# Patient Record
Sex: FEMALE | Race: WHITE | Employment: OTHER | URBAN - METROPOLITAN AREA
[De-identification: names, ages, dates, MRNs, and addresses within clinical notes are randomized per-mention and may not be internally consistent; named-entity substitution may affect disease eponyms.]

---

## 2017-01-12 ENCOUNTER — MED REC SCAN ONLY (OUTPATIENT)
Dept: NEPHROLOGY | Facility: CLINIC | Age: 82
End: 2017-01-12

## 2017-01-18 PROBLEM — H35.3221 EXUDATIVE AGE-RELATED MACULAR DEGENERATION OF LEFT EYE WITH ACTIVE CHOROIDAL NEOVASCULARIZATION (HCC): Status: ACTIVE | Noted: 2017-01-18

## 2017-01-25 ENCOUNTER — MED REC SCAN ONLY (OUTPATIENT)
Dept: NEPHROLOGY | Facility: CLINIC | Age: 82
End: 2017-01-25

## 2017-02-02 ENCOUNTER — OFFICE VISIT (OUTPATIENT)
Dept: HEMATOLOGY/ONCOLOGY | Age: 82
End: 2017-02-02
Attending: INTERNAL MEDICINE
Payer: MEDICARE

## 2017-02-02 VITALS
TEMPERATURE: 98 F | RESPIRATION RATE: 18 BRPM | BODY MASS INDEX: 21.02 KG/M2 | SYSTOLIC BLOOD PRESSURE: 140 MMHG | OXYGEN SATURATION: 98 % | HEART RATE: 98 BPM | DIASTOLIC BLOOD PRESSURE: 65 MMHG | HEIGHT: 65 IN | WEIGHT: 126.19 LBS

## 2017-02-02 DIAGNOSIS — C20 RECTAL CANCER (HCC): Primary | ICD-10-CM

## 2017-02-02 DIAGNOSIS — L02.91 ABSCESS: ICD-10-CM

## 2017-02-02 PROCEDURE — 99205 OFFICE O/P NEW HI 60 MIN: CPT | Performed by: INTERNAL MEDICINE

## 2017-02-02 NOTE — PROGRESS NOTES
Pt here for consult for anal ca. Pt has had ileostomy for about 2.5 years now. Pt has macular degeneration. Energy level and appetite are good. Denies pain. Pt has no further complaints.      Education Record    Learner:  Patient    Disease / Diagnosis:

## 2017-02-03 NOTE — CONSULTS
[facility]    PATIENT'S NAME: MALCOLM Kidd   CONSULTING PHYSICIAN: Thad Alba M.D.    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   She has had imaging that includes an MRI of the abdomen and pelvis done through [facility] 2 November 30, 2016, and shows this abscess cavity but n a history of Sjogren syndrome, Crohn disease, fibromyalgia. She has had vulvar Paget's disease. She is status post vulvectomy. She has had macular degeneration. The perirectal abscesses. She had a TIA on 1 occasion. She has had hypertension.   She has SIGNS:  Her performance status is 1. Her weight is 126 pounds. Blood pressure 140/65, pulse 98, respiratory rate 20, temperature 97.8. HEENT:  Unremarkable. She has pink conjunctivae, anicteric sclerae. Pharynx without lesions.   LYMPHATICS:  She has n with whatever approach would be undertaken. A single agent anti-EGFR antibody is a reasonable choice but relatively unlikely to work. I am still awaiting her DELILAH/GUCCI testing.   I told her I would present her at our own [facility] 16.

## 2017-02-03 NOTE — CONSULTS
[facility]    PATIENT'S NAME: MALCOLM Gates   CONSULTING PHYSICIAN: Anna Calixto M.D.    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   She has had imaging that includes an MRI of the abdomen and pelvis done through [facility] 2 November 30, 2016, and shows this abscess cavity but no distant metastases, no evidence o Crohn disease, fibromyalgia. She has had vulvar Paget's disease. She is status post vulvectomy. She has had macular degeneration. The perirectal abscesses. She had a TIA on 1 occasion. She has had hypertension. She has history of pneumonia.   She has Her weight is 126 pounds. Blood pressure 140/65, pulse 98, respiratory rate 20, temperature 97.8. HEENT:  Unremarkable. She has pink conjunctivae, anicteric sclerae. Pharynx without lesions.   LYMPHATICS:  She has no cervical, supraclavicular or axilla single agent anti-EGFR antibody is a reasonable choice, but relatively unlikely to work. I am still awaiting her DELILAH/GUCCI testing. I told her I would present her at our multidisciplinary GI oncology conference this coming week.   I am asking her to return

## 2017-02-08 ENCOUNTER — TELEPHONE (OUTPATIENT)
Dept: HEMATOLOGY/ONCOLOGY | Facility: HOSPITAL | Age: 82
End: 2017-02-08

## 2017-02-08 PROBLEM — Z15.89: Status: ACTIVE | Noted: 2017-02-08

## 2017-02-08 NOTE — TELEPHONE ENCOUNTER
Spoke to patient about my presentation of her at [address] [address] [address] Dr Valery Cruz has offered to see her without any promises of an ability to do something about this. She is interested in meeting him and would like to do so at his [address] office.   I will forward info to R

## 2017-02-09 ENCOUNTER — OFFICE VISIT (OUTPATIENT)
Dept: NEPHROLOGY | Facility: CLINIC | Age: 82
End: 2017-02-09

## 2017-02-09 VITALS
HEART RATE: 78 BPM | WEIGHT: 127 LBS | SYSTOLIC BLOOD PRESSURE: 116 MMHG | BODY MASS INDEX: 21 KG/M2 | RESPIRATION RATE: 14 BRPM | DIASTOLIC BLOOD PRESSURE: 64 MMHG

## 2017-02-09 DIAGNOSIS — N18.4 CHRONIC KIDNEY DISEASE, STAGE IV (SEVERE) (HCC): ICD-10-CM

## 2017-02-09 DIAGNOSIS — D63.1 ANEMIA IN CHRONIC RENAL DISEASE: Primary | ICD-10-CM

## 2017-02-09 DIAGNOSIS — N18.9 ANEMIA IN CHRONIC RENAL DISEASE: Primary | ICD-10-CM

## 2017-02-09 PROCEDURE — 99214 OFFICE O/P EST MOD 30 MIN: CPT | Performed by: INTERNAL MEDICINE

## 2017-02-09 PROCEDURE — 96372 THER/PROPH/DIAG INJ SC/IM: CPT | Performed by: INTERNAL MEDICINE

## 2017-02-09 NOTE — PROGRESS NOTES
Nephrology Progress Note      ASSESSMENT/PLAN:        1) CKD 3- baseline Cr < 2 mg/dl with labs fluctuating depending on volume status (PO intake vs ostomy losses); has modest intrinsic renal dysfunction. Previous eval for other etiologies unrevealing.  UA unspecified whether generalized or localized, unspecified site    • Visual impairment    • Autoimmune disease ([facility]    • Unspecified essential hypertension    • Blood disorder    • Psychiatric disorder    • Macular degeneration    • Perirectal abscess    • Outpatient Prescriptions:  acetaminophen 500 MG Oral Tab Take 1,000 mg by mouth every 6 (six) hours as needed for Pain. Disp:  Rfl:    Multiple Vitamins-Minerals (HM MULTIVITAMIN ADULT GUMMY) Oral Chew Tab Chew 1 capsule by mouth daily.  Disp:  Rfl:    diph extremities  Skin: Warm and dry, no rashes      Amari Ravi MD  2/9/2016  205 PM

## 2017-02-15 ENCOUNTER — OFFICE VISIT (OUTPATIENT)
Dept: SURGERY | Facility: CLINIC | Age: 82
End: 2017-02-15

## 2017-02-15 VITALS
HEIGHT: 65 IN | HEART RATE: 71 BPM | WEIGHT: 126 LBS | SYSTOLIC BLOOD PRESSURE: 155 MMHG | DIASTOLIC BLOOD PRESSURE: 74 MMHG | TEMPERATURE: 98 F | BODY MASS INDEX: 20.99 KG/M2

## 2017-02-15 DIAGNOSIS — C20 RECTAL CANCER (HCC): Primary | ICD-10-CM

## 2017-02-15 PROCEDURE — 99205 OFFICE O/P NEW HI 60 MIN: CPT | Performed by: SURGERY

## 2017-02-16 ENCOUNTER — OFFICE VISIT (OUTPATIENT)
Dept: HEMATOLOGY/ONCOLOGY | Age: 82
End: 2017-02-16
Attending: INTERNAL MEDICINE
Payer: MEDICARE

## 2017-02-16 VITALS
OXYGEN SATURATION: 98 % | HEART RATE: 81 BPM | BODY MASS INDEX: 20.96 KG/M2 | HEIGHT: 65 IN | RESPIRATION RATE: 18 BRPM | SYSTOLIC BLOOD PRESSURE: 133 MMHG | DIASTOLIC BLOOD PRESSURE: 68 MMHG | WEIGHT: 125.81 LBS | TEMPERATURE: 98 F

## 2017-02-16 DIAGNOSIS — C20 RECTAL CANCER (HCC): Primary | ICD-10-CM

## 2017-02-16 DIAGNOSIS — L02.91 ABSCESS: ICD-10-CM

## 2017-02-16 DIAGNOSIS — Z15.89: ICD-10-CM

## 2017-02-16 PROCEDURE — 99214 OFFICE O/P EST MOD 30 MIN: CPT | Performed by: INTERNAL MEDICINE

## 2017-02-16 NOTE — PROGRESS NOTES
Pt here for 2 week MD f/u. Energy level is fair, appetite has been good. Denies pain. Pt had surgery for macular degeneration yesterday, having pain in eyes. Pt saw Dr. Ruth Garcia yesterday also. Pt has no further complaints.      Education Record    Learner:  JAKI

## 2017-02-16 NOTE — CONSULTS
[facility]    Patient Name:  Ruma Oseguera   YOB: 1934   Gender:  Female   Appt Date:  2/15/2017   Provider:  Jaswinder Allen MD   Insurance:  [address] [place] [address]  Referring Provider: Dr. Berta Vines For about 50 years, she has had Crohn's colitis. In the past 25-30 years, she underwent two partial colectomies to avoid stoma formation.  On 9/19/2014, she underwent completion colectomy with a Samy's pouch, small bowel resection and end-loop ileostomy Sulfa Antibiotics       Rash     History:  Reviewed:  Past Medical History   Diagnosis Date   • THALLASEMIA    • [facility]'s syndrome ([facility]    • Insomnia    • ALLERGIC RHINITIS    • ANXIETY    • KIDNEY STONE    • OSTEOPENIA    • Crohn's disease ([facility]    • Fi Comment: 1 glass of wine a month     Reviewed:  Family History   Problem Relation Age of Onset   • Cancer Sister      breast cancer   • Stroke Sister    • Heart Attack Sister    • Neurological Disorder Sister      dementia/organic brain syndrome   • B Findings were discussed with patient at length. Her  was present. Surgical intervention will be a major undertaking given her presentation and Dr. Pramod Osborn operative report. However, it remains an option at this time.  Having said that, the last MRI was

## 2017-02-17 NOTE — PROGRESS NOTES
[facility]    PATIENT'S NAME: Luca Olvera [place] A   ATTENDING PHYSICIAN: Cathi Diop M.D.    PATIENT ACCOUNT #: [de-identified] LOCATION: [address] [place] [address] RECORD #: [id number] YOB: 1934   DATE OF SERVICE: 02/16/2017       CANCER C no acute distress. VITAL SIGNS:  Performance status is 1. Weight 125 pounds, blood pressure 133/68, pulse 81, respiratory rate 20, temperature 98.0. HEENT:  Remarkable for sunglasses and significant conjunctival injection.   LYMPHATICS:  She has no adeno

## 2017-03-09 ENCOUNTER — OFFICE VISIT (OUTPATIENT)
Dept: HEMATOLOGY/ONCOLOGY | Age: 82
End: 2017-03-09
Attending: INTERNAL MEDICINE
Payer: MEDICARE

## 2017-03-09 VITALS
TEMPERATURE: 99 F | BODY MASS INDEX: 20.66 KG/M2 | RESPIRATION RATE: 18 BRPM | WEIGHT: 124 LBS | HEIGHT: 65 IN | HEART RATE: 81 BPM | DIASTOLIC BLOOD PRESSURE: 76 MMHG | SYSTOLIC BLOOD PRESSURE: 127 MMHG | OXYGEN SATURATION: 98 %

## 2017-03-09 DIAGNOSIS — C20 RECTAL CANCER (HCC): Primary | ICD-10-CM

## 2017-03-09 DIAGNOSIS — Z15.89: ICD-10-CM

## 2017-03-09 DIAGNOSIS — L02.91 ABSCESS: ICD-10-CM

## 2017-03-09 PROCEDURE — 99214 OFFICE O/P EST MOD 30 MIN: CPT | Performed by: INTERNAL MEDICINE

## 2017-03-09 NOTE — PROGRESS NOTES
Pt here for 3 week MD f/u. Pt had PET scan and MRI on 2/27. Pt's energy level is fair, naps during the day. Appetite is good. Pt has home care for abcess. Pt has no further complaints. Education Record    Learner:  Patient    Disease / Diagnosis:     Cm

## 2017-04-01 ENCOUNTER — HOSPITAL ENCOUNTER (OUTPATIENT)
Dept: RADIATION ONCOLOGY | Facility: HOSPITAL | Age: 82
End: 2017-04-01
Attending: RADIOLOGY
Payer: MEDICARE

## 2017-04-01 ENCOUNTER — SOCIAL WORK SERVICES (OUTPATIENT)
Dept: HEMATOLOGY/ONCOLOGY | Facility: HOSPITAL | Age: 82
End: 2017-04-01

## 2017-04-01 NOTE — PROGRESS NOTES
Patient called, advising that she has left messages \"all over\", looking for the names of home health companies that will serve her in [place].   She advises that she was with [facility], but they stopped seeing her \"because she went out in the car

## 2017-04-05 ENCOUNTER — OFFICE VISIT (OUTPATIENT)
Dept: SURGERY | Facility: CLINIC | Age: 82
End: 2017-04-05

## 2017-04-05 ENCOUNTER — TELEPHONE (OUTPATIENT)
Dept: NEPHROLOGY | Facility: CLINIC | Age: 82
End: 2017-04-05

## 2017-04-05 VITALS
DIASTOLIC BLOOD PRESSURE: 73 MMHG | SYSTOLIC BLOOD PRESSURE: 152 MMHG | HEART RATE: 73 BPM | OXYGEN SATURATION: 99 % | TEMPERATURE: 99 F | WEIGHT: 128 LBS | HEIGHT: 65 IN | BODY MASS INDEX: 21.33 KG/M2

## 2017-04-05 DIAGNOSIS — C20 RECTAL CANCER (HCC): Primary | ICD-10-CM

## 2017-04-05 PROCEDURE — 99213 OFFICE O/P EST LOW 20 MIN: CPT | Performed by: SURGERY

## 2017-04-05 RX ORDER — VIT A/VIT C/VIT E/ZINC/COPPER 7160-113
TABLET, DELAYED RELEASE (ENTERIC COATED) ORAL
COMMUNITY
End: 2017-05-16

## 2017-04-06 NOTE — PROGRESS NOTES
[facility]    Patient Name:  Ludmila Heredia   YOB: 1934   Gender:  Female   Appt Date:  4/5/2017   Provider:  Rafita Sequeira MD   Insurance:  [facility] More recently, she has been dealing with Dr Debra Ford at [facility]. On 1/9/2017, he performed an EUA (including vaginal) with curettage for perianeal abscess formation. However, curettage specimen showed mucinous adenocarcinoma.  A pelvic MRI on 11/30/17 had shown f Past Medical History   Diagnosis Date   • THALLASEMIA    • Sjogren's syndrome ([facility]    • Insomnia    • ALLERGIC RHINITIS    • ANXIETY    • KIDNEY STONE    • OSTEOPENIA    • Crohn's disease ([facility]    • Fibromyalgia    • Paget's disease      Vulva   • Thalass Problem Relation Age of Onset   • Cancer Sister      breast cancer   • Stroke Sister    • Heart Attack Sister    • Neurological Disorder Sister      dementia/organic brain syndrome   • Breast Cancer Sister 79   • Cancer Brother      basal cell cancer on sc PET/CT and MRI were reviewed and discussed with the patient. I agree with official reports. Procedure(s):  one     Assessment / Plan:  (C20) Rectal cancer (locally advanced).     Patient is his candidate for surgical management for local control of d

## 2017-04-07 ENCOUNTER — HOSPITAL ENCOUNTER (OUTPATIENT)
Dept: RADIATION ONCOLOGY | Facility: HOSPITAL | Age: 82
Discharge: HOME | End: 2017-04-07
Attending: RADIOLOGY
Payer: MEDICARE

## 2017-04-07 VITALS
SYSTOLIC BLOOD PRESSURE: 138 MMHG | TEMPERATURE: 97 F | HEART RATE: 80 BPM | RESPIRATION RATE: 20 BRPM | BODY MASS INDEX: 21 KG/M2 | DIASTOLIC BLOOD PRESSURE: 81 MMHG | WEIGHT: 128.5 LBS

## 2017-04-07 DIAGNOSIS — C21.1 ADENOCARCINOMA OF ANAL CANAL (HCC): Primary | ICD-10-CM

## 2017-04-07 PROCEDURE — 99214 OFFICE O/P EST MOD 30 MIN: CPT

## 2017-04-07 RX ORDER — VITAMIN E 268 MG
400 CAPSULE ORAL DAILY
COMMUNITY
End: 2017-05-16

## 2017-04-07 RX ORDER — FOLIC ACID 1 MG/1
1 TABLET ORAL DAILY
COMMUNITY
End: 2017-05-16

## 2017-04-07 NOTE — PATIENT INSTRUCTIONS
Call to schedule MRI of the pelvis [phone]    Your CT simulation is scheduled in the [facility] for       Follow the full bladder instructions for the simulation. RN desk [phone] with any questions.

## 2017-04-07 NOTE — PROGRESS NOTES
Nursing Consultation Note  Patient: Bossman Vasquez  YOB: 1934  Age: 80year old  Radiation Oncologist: Dr. Vanessa Urrutia  Referring Physician: Dr Liliana Rosales, Dr Obey Olea, Dr Alcira Ochoa, Dr Renate Nieto- GI  Diagnosis:No diagnosis found.   Consult Date: irrigation after the procedure, and has stopped now. She has contacted Dr Felicita Westfall to see if she should continue or not. Dr Argelia Pressley discussed surgical options with them on 4/5/17. She cont to have drainage from the vaginal and rectum.  No blood, looks like mu • Peripheral vascular disease ([facility]    • Renal disorder      ckd   • [facility] disease ([facility]    • High blood pressure    • Esophageal reflux    • IBS (irritable bowel syndrome)    • Anxiety state    Surgical History:    Past Surgical History    APPENDECTOMY mother's day. She has not seen her in several years. 1 son - adopted Ruth Ann [place] - 3 gd- 25- akshat, 12- kiesha, 6- valente and she is with son full time as he has full custody.  He  has a job at [employer] and does maintenance ; [place]

## 2017-04-07 NOTE — PROGRESS NOTES
[facility] RADIATION ONCOLOGY CONSULTATION    PATIENT:   Mariana Walker      80year old      7/28/1934    REFERRING MD:  Dr. 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 No evidence of disease beyond this. Her CEA on 2/9/2017 is 15.2.     PMH:  Macular degeneration, Crohn's colitis, hypertension, chronic kidney disease    PSH:  Appendectomy, multi typically melt away with radiotherapy, but treatment can certainly delay progression, and induce a reasonable response in most patients    I am thinking of a 5 or 6 week course of therapy depending on her tolerance  I will confer with Dr. Rhea Santoro whether co

## 2017-04-10 ENCOUNTER — TELEPHONE (OUTPATIENT)
Dept: RADIATION ONCOLOGY | Facility: HOSPITAL | Age: 82
End: 2017-04-10

## 2017-04-10 NOTE — TELEPHONE ENCOUNTER
Pt phoned in with concerns about her upcoming MRI and her BUN. I re assured her that Dr Minesh Zavala ordered the MRI without contrast, so it should be ok. I reassured her that it's in the computer that there is no contrast, so it shouldn't be a problem.

## 2017-04-11 ENCOUNTER — HOSPITAL ENCOUNTER (OUTPATIENT)
Dept: MRI IMAGING | Age: 82
Discharge: HOME | End: 2017-04-11
Attending: RADIOLOGY
Payer: MEDICARE

## 2017-04-11 DIAGNOSIS — C21.1 ADENOCARCINOMA OF ANAL CANAL (HCC): ICD-10-CM

## 2017-04-11 PROCEDURE — 72195 MRI PELVIS W/O DYE: CPT

## 2017-04-13 ENCOUNTER — HOSPITAL ENCOUNTER (OUTPATIENT)
Dept: RADIATION ONCOLOGY | Facility: HOSPITAL | Age: 82
Discharge: HOME | End: 2017-04-13
Attending: RADIOLOGY
Payer: MEDICARE

## 2017-04-13 PROCEDURE — 77470 SPECIAL RADIATION TREATMENT: CPT | Performed by: RADIOLOGY

## 2017-04-13 PROCEDURE — 77399 UNLISTED PX MED RADJ PHYSICS: CPT | Performed by: RADIOLOGY

## 2017-04-13 PROCEDURE — 77334 RADIATION TREATMENT AID(S): CPT | Performed by: RADIOLOGY

## 2017-04-19 ENCOUNTER — TELEPHONE (OUTPATIENT)
Dept: RADIATION ONCOLOGY | Facility: HOSPITAL | Age: 82
End: 2017-04-19

## 2017-04-19 PROCEDURE — 77300 RADIATION THERAPY DOSE PLAN: CPT | Performed by: RADIOLOGY

## 2017-04-19 PROCEDURE — 77338 DESIGN MLC DEVICE FOR IMRT: CPT | Performed by: RADIOLOGY

## 2017-04-19 PROCEDURE — 77301 RADIOTHERAPY DOSE PLAN IMRT: CPT | Performed by: RADIOLOGY

## 2017-04-19 NOTE — PROGRESS NOTES
Spoke to Dr Leonor Pastor. Plan RT to pelvis with concurrent capecitabine. Plan dose of 825 mg/Sq M BID 7 days a week throughout RT. Dose comes out to be slightly above 1300 mg - I will dose reduce due to age and sex to 1000mg BID fixed dose. Order placed.   [place]

## 2017-04-19 NOTE — TELEPHONE ENCOUNTER
RADIATION ONCOLOGY    TC to pt. RT should be ready to start soon. Will contact her with treatment start date. Worst case, Monday 4/24. Spoke to Dr. Liliana Rosales. We agree concurrent Xeloda may be of further benefit.   Can be started and added to RT onc

## 2017-04-20 ENCOUNTER — HOSPITAL ENCOUNTER (OUTPATIENT)
Dept: RADIATION ONCOLOGY | Facility: HOSPITAL | Age: 82
Discharge: HOME | End: 2017-04-20
Attending: RADIOLOGY
Payer: MEDICARE

## 2017-04-20 DIAGNOSIS — C20 RECTAL CANCER (HCC): Primary | ICD-10-CM

## 2017-04-20 PROCEDURE — 77386 HC IMRT COMPLEX: CPT | Performed by: RADIOLOGY

## 2017-04-20 RX ORDER — CAPECITABINE 500 MG/1
1000 TABLET, FILM COATED ORAL 2 TIMES DAILY
Qty: 120 TABLET | Refills: 5 | Status: ON HOLD | OUTPATIENT
Start: 2017-04-20 | End: 2017-05-28

## 2017-04-21 ENCOUNTER — TELEPHONE (OUTPATIENT)
Dept: HEMATOLOGY/ONCOLOGY | Facility: HOSPITAL | Age: 82
End: 2017-04-21

## 2017-04-21 PROCEDURE — 77386 HC IMRT COMPLEX: CPT | Performed by: RADIOLOGY

## 2017-04-24 ENCOUNTER — LAB ENCOUNTER (OUTPATIENT)
Dept: LAB | Facility: HOSPITAL | Age: 82
End: 2017-04-24
Attending: INTERNAL MEDICINE
Payer: MEDICARE

## 2017-04-24 ENCOUNTER — HOSPITAL ENCOUNTER (OUTPATIENT)
Dept: RADIATION ONCOLOGY | Facility: HOSPITAL | Age: 82
Discharge: HOME | End: 2017-04-24
Attending: RADIOLOGY
Payer: MEDICARE

## 2017-04-24 ENCOUNTER — TELEPHONE (OUTPATIENT)
Dept: HEMATOLOGY/ONCOLOGY | Facility: HOSPITAL | Age: 82
End: 2017-04-24

## 2017-04-24 DIAGNOSIS — C21.1 ADENOCARCINOMA OF ANAL CANAL (HCC): Primary | ICD-10-CM

## 2017-04-24 DIAGNOSIS — C20 RECTAL CANCER (HCC): ICD-10-CM

## 2017-04-24 PROCEDURE — 83540 ASSAY OF IRON: CPT

## 2017-04-24 PROCEDURE — 83550 IRON BINDING TEST: CPT

## 2017-04-24 PROCEDURE — 85025 COMPLETE CBC W/AUTO DIFF WBC: CPT

## 2017-04-24 PROCEDURE — 80053 COMPREHEN METABOLIC PANEL: CPT

## 2017-04-24 PROCEDURE — 82378 CARCINOEMBRYONIC ANTIGEN: CPT

## 2017-04-24 PROCEDURE — 77386 HC IMRT COMPLEX: CPT | Performed by: RADIOLOGY

## 2017-04-24 PROCEDURE — 82728 ASSAY OF FERRITIN: CPT

## 2017-04-24 NOTE — TELEPHONE ENCOUNTER
Patient started radiation last week. Oral capecitabine set up to come from [facility]. Will ask the patient to call and confirm scheduling. Left voice mail on cell phone.  Also asked Radiation to share the phone number with the patient so she can call

## 2017-04-25 ENCOUNTER — TELEPHONE (OUTPATIENT)
Dept: HEMATOLOGY/ONCOLOGY | Facility: HOSPITAL | Age: 82
End: 2017-04-25

## 2017-04-25 ENCOUNTER — HOSPITAL ENCOUNTER (OUTPATIENT)
Dept: ULTRASOUND IMAGING | Age: 82
Discharge: HOME | End: 2017-04-25
Attending: INTERNAL MEDICINE
Payer: MEDICARE

## 2017-04-25 DIAGNOSIS — R79.89 ELEVATED SERUM CREATININE: Primary | ICD-10-CM

## 2017-04-25 DIAGNOSIS — R79.89 ELEVATED SERUM CREATININE: ICD-10-CM

## 2017-04-25 PROCEDURE — 77386 HC IMRT COMPLEX: CPT | Performed by: RADIOLOGY

## 2017-04-25 PROCEDURE — 76775 US EXAM ABDO BACK WALL LIM: CPT

## 2017-04-25 NOTE — TELEPHONE ENCOUNTER
Attempted to call patient back to confirm dosing of xeloda, she was at [address] of kidneys.  Will send my chart message and call again in AM

## 2017-04-25 NOTE — TELEPHONE ENCOUNTER
Test(s) completed: CMP  Results: per Dr Epi Frankel her know that the kidney function is a little worse.  I need to make sure that she doesn't have obstruction of her kidneys at the level of the ureters where then enter into the bladder.  She needs a bila

## 2017-04-26 ENCOUNTER — TELEPHONE (OUTPATIENT)
Dept: HEMATOLOGY/ONCOLOGY | Facility: HOSPITAL | Age: 82
End: 2017-04-26

## 2017-04-26 ENCOUNTER — OFFICE VISIT (OUTPATIENT)
Dept: HEMATOLOGY/ONCOLOGY | Facility: HOSPITAL | Age: 82
End: 2017-04-26
Attending: INTERNAL MEDICINE
Payer: MEDICARE

## 2017-04-26 DIAGNOSIS — C20 RECTAL CANCER (HCC): Primary | ICD-10-CM

## 2017-04-26 DIAGNOSIS — Z71.9 ENCOUNTER FOR EDUCATION: Primary | ICD-10-CM

## 2017-04-26 PROCEDURE — 99214 OFFICE O/P EST MOD 30 MIN: CPT | Performed by: NURSE PRACTITIONER

## 2017-04-26 PROCEDURE — 77386 HC IMRT COMPLEX: CPT | Performed by: RADIOLOGY

## 2017-04-26 NOTE — PATIENT INSTRUCTIONS
Weekly labs, patient plans to have done in outpatient office building outside of the cancer center due to her preference

## 2017-04-26 NOTE — PROGRESS NOTES
ORAL CHEMOTHERAPY EDUCATION RECORD  Learner:  Patient  Barriers / Limitations:  None    Diagnosis:   Anal canal cancer  Chemo Agents / Protocol:   Oral xeloda along with Radiation  20 fractions with boost of 10 fractions planned    Medication Name:   Daphney Hale

## 2017-04-27 ENCOUNTER — APPOINTMENT (OUTPATIENT)
Dept: HEMATOLOGY/ONCOLOGY | Facility: HOSPITAL | Age: 82
End: 2017-04-27
Attending: CLINICAL NURSE SPECIALIST
Payer: MEDICARE

## 2017-04-27 PROCEDURE — 77386 HC IMRT COMPLEX: CPT | Performed by: RADIOLOGY

## 2017-04-28 PROCEDURE — 77386 HC IMRT COMPLEX: CPT | Performed by: RADIOLOGY

## 2017-04-28 PROCEDURE — 77336 RADIATION PHYSICS CONSULT: CPT | Performed by: RADIOLOGY

## 2017-04-30 ENCOUNTER — SNF/IP PROF CHARGE ONLY (OUTPATIENT)
Dept: HEMATOLOGY/ONCOLOGY | Facility: HOSPITAL | Age: 82
End: 2017-04-30

## 2017-04-30 DIAGNOSIS — C20 RECTAL CANCER (HCC): Primary | ICD-10-CM

## 2017-04-30 PROCEDURE — G9678 ONCOLOGY CARE MODEL SERVICE: HCPCS | Performed by: INTERNAL MEDICINE

## 2017-05-01 ENCOUNTER — HOSPITAL ENCOUNTER (OUTPATIENT)
Dept: RADIATION ONCOLOGY | Facility: HOSPITAL | Age: 82
Discharge: HOME | End: 2017-05-01
Attending: RADIOLOGY
Payer: MEDICARE

## 2017-05-01 VITALS
DIASTOLIC BLOOD PRESSURE: 59 MMHG | HEART RATE: 79 BPM | RESPIRATION RATE: 20 BRPM | WEIGHT: 130 LBS | TEMPERATURE: 97 F | BODY MASS INDEX: 22 KG/M2 | SYSTOLIC BLOOD PRESSURE: 127 MMHG

## 2017-05-01 DIAGNOSIS — C21.1 ADENOCARCINOMA OF ANAL CANAL (HCC): Primary | ICD-10-CM

## 2017-05-01 PROCEDURE — 77386 HC IMRT COMPLEX: CPT | Performed by: RADIOLOGY

## 2017-05-01 NOTE — PROGRESS NOTES
[facility] Radiation Treatment Management Note 6-10    Patient:  Norm Brink  Age:  80year old  Visit Diagnosis:    1.  Adenocarcinoma of anal canal ([address]      Primary Rad/Onc:  Dr. Marcello Laguerre    Site Delivered Dose (Gy) Pr

## 2017-05-02 ENCOUNTER — OFFICE VISIT (OUTPATIENT)
Dept: HEMATOLOGY/ONCOLOGY | Facility: HOSPITAL | Age: 82
End: 2017-05-02
Attending: INTERNAL MEDICINE
Payer: MEDICARE

## 2017-05-02 VITALS
WEIGHT: 129.38 LBS | TEMPERATURE: 96 F | DIASTOLIC BLOOD PRESSURE: 57 MMHG | RESPIRATION RATE: 18 BRPM | BODY MASS INDEX: 21.56 KG/M2 | OXYGEN SATURATION: 99 % | SYSTOLIC BLOOD PRESSURE: 144 MMHG | HEART RATE: 81 BPM | HEIGHT: 65 IN

## 2017-05-02 DIAGNOSIS — N18.3 CKD (CHRONIC KIDNEY DISEASE), STAGE 3 (MODERATE): Primary | ICD-10-CM

## 2017-05-02 DIAGNOSIS — C20 RECTAL CANCER (HCC): ICD-10-CM

## 2017-05-02 PROCEDURE — 77386 HC IMRT COMPLEX: CPT | Performed by: RADIOLOGY

## 2017-05-02 PROCEDURE — 99214 OFFICE O/P EST MOD 30 MIN: CPT | Performed by: INTERNAL MEDICINE

## 2017-05-02 RX ORDER — LOPERAMIDE HYDROCHLORIDE 2 MG/1
2 CAPSULE ORAL AS NEEDED
COMMUNITY
End: 2018-01-15

## 2017-05-02 RX ORDER — MULTIVITAMIN
TABLET ORAL
COMMUNITY
End: 2017-05-16

## 2017-05-02 NOTE — PROGRESS NOTES
Patient is here for MD f/u for rectal cancer. Started radiation to the rectum on 4/20. Started Xeloda 1000 mg bid on 4/25 daily including weekends. Pt is tolerating well so far. Pt had mild rectal discharge last week but resolved on its own.  Watery stools

## 2017-05-03 PROCEDURE — 77386 HC IMRT COMPLEX: CPT | Performed by: RADIOLOGY

## 2017-05-04 PROCEDURE — 77386 HC IMRT COMPLEX: CPT | Performed by: RADIOLOGY

## 2017-05-04 NOTE — PROGRESS NOTES
[facility]    PATIENT'S NAME: Juve Dear, Scott BULLOCK   ATTENDING PHYSICIAN: Len Karimi M.D.    PATIENT ACCOUNT #: [de-identified] LOCATION: [address] [place] [address] RECORD #: [id number] YOB: 1934   DATE OF SERVICE: 05/02/2017       CANCER mucositis. She has had no hand-foot symptoms. PHYSICAL EXAMINATION:    GENERAL:  She is a well-developed, thin female in no acute distress. VITAL SIGNS:  Her performance status is 1. Her weight is 129 pounds.   Blood pressure is 144/57, pulse 81, resp she needs on a weekly basis. Dictated By Peter Boswell M.D.  d: 05/04/2017 11:43:11  t: 05/04/2017 12:03:39  Psychiatric [id number]  /    cc: HERMINIA Ventura M.D.  [place].  Maliha Shafer MD

## 2017-05-05 ENCOUNTER — TELEPHONE (OUTPATIENT)
Dept: NEPHROLOGY | Facility: CLINIC | Age: 82
End: 2017-05-05

## 2017-05-05 DIAGNOSIS — C20 RECTAL CANCER (HCC): ICD-10-CM

## 2017-05-05 DIAGNOSIS — N18.3 CKD (CHRONIC KIDNEY DISEASE), STAGE 3 (MODERATE): Primary | ICD-10-CM

## 2017-05-05 PROCEDURE — 77336 RADIATION PHYSICS CONSULT: CPT | Performed by: RADIOLOGY

## 2017-05-05 PROCEDURE — 77386 HC IMRT COMPLEX: CPT | Performed by: RADIOLOGY

## 2017-05-05 NOTE — PROGRESS NOTES
Per Dr Berlin Phillips \"This lady wants to get her labs done through [facility].  She needs them weekly during chemo/RT.  I will ask Dr Isabel Glynn to put an order in - I have given her a written order but I see she hadn't gotten a CMP done this week   Needs CBC, CMP weekly.

## 2017-05-05 NOTE — TELEPHONE ENCOUNTER
Left VM for pt- reviewed meds (benign); renal function stable; continue PO sodium bicarbonate; weekly labs with Dr. Deysi Berger- iris Felton

## 2017-05-08 ENCOUNTER — HOSPITAL ENCOUNTER (OUTPATIENT)
Dept: RADIATION ONCOLOGY | Facility: HOSPITAL | Age: 82
Discharge: HOME | End: 2017-05-08
Attending: RADIOLOGY
Payer: MEDICARE

## 2017-05-08 VITALS
HEART RATE: 89 BPM | BODY MASS INDEX: 21 KG/M2 | RESPIRATION RATE: 20 BRPM | SYSTOLIC BLOOD PRESSURE: 143 MMHG | DIASTOLIC BLOOD PRESSURE: 78 MMHG | TEMPERATURE: 97 F | WEIGHT: 127.38 LBS

## 2017-05-08 DIAGNOSIS — C21.1 ADENOCARCINOMA OF ANAL CANAL (HCC): Primary | ICD-10-CM

## 2017-05-08 PROCEDURE — 77386 HC IMRT COMPLEX: CPT | Performed by: RADIOLOGY

## 2017-05-08 NOTE — PROGRESS NOTES
[facility] Radiation Treatment Management Note 6-10    Patient:  Ana Cristina Paiz  Age:  80year old  Visit Diagnosis:    1.  Adenocarcinoma of anal canal ([address]      Primary Rad/Onc:  Dr. Susan Mock    Site Delivered Dose (Gy) Pr plan    Tolerating Xeloda    Next visit:  1 week    Dr. Mendez Pro

## 2017-05-09 ENCOUNTER — OFFICE VISIT (OUTPATIENT)
Dept: HEMATOLOGY/ONCOLOGY | Facility: HOSPITAL | Age: 82
End: 2017-05-09
Attending: INTERNAL MEDICINE
Payer: MEDICARE

## 2017-05-09 PROCEDURE — 77386 HC IMRT COMPLEX: CPT | Performed by: RADIOLOGY

## 2017-05-09 NOTE — PROGRESS NOTES
NUTRITION F/U NOTE:     DX: anal cancer  TX: concurrent chemo (xeloda) RT    PMH: crohn's dz w/ ileostomy    WT HX:  05/08/17: 127 lbs  05/02/17: 129 lbs  50/10/17: 130 lbs     A/P: RD f/u w/ pt prior to her RT today. Noted pt drinking coffee.  Pt noted \"h

## 2017-05-10 PROCEDURE — 77386 HC IMRT COMPLEX: CPT | Performed by: RADIOLOGY

## 2017-05-11 ENCOUNTER — TELEPHONE (OUTPATIENT)
Dept: HEMATOLOGY/ONCOLOGY | Facility: HOSPITAL | Age: 82
End: 2017-05-11

## 2017-05-11 ENCOUNTER — APPOINTMENT (OUTPATIENT)
Dept: GENERAL RADIOLOGY | Facility: HOSPITAL | Age: 82
End: 2017-05-11
Attending: EMERGENCY MEDICINE
Payer: MEDICARE

## 2017-05-11 ENCOUNTER — HOSPITAL ENCOUNTER (OUTPATIENT)
Facility: HOSPITAL | Age: 82
Setting detail: OBSERVATION
Discharge: HOME | End: 2017-05-12
Attending: EMERGENCY MEDICINE | Admitting: INTERNAL MEDICINE
Payer: MEDICARE

## 2017-05-11 ENCOUNTER — APPOINTMENT (OUTPATIENT)
Dept: CT IMAGING | Facility: HOSPITAL | Age: 82
End: 2017-05-11
Attending: INTERNAL MEDICINE
Payer: MEDICARE

## 2017-05-11 ENCOUNTER — TELEPHONE (OUTPATIENT)
Dept: RADIATION ONCOLOGY | Facility: HOSPITAL | Age: 82
End: 2017-05-11

## 2017-05-11 DIAGNOSIS — R07.9 CHEST PAIN, UNSPECIFIED TYPE: Primary | ICD-10-CM

## 2017-05-11 PROCEDURE — 84484 ASSAY OF TROPONIN QUANT: CPT | Performed by: INTERNAL MEDICINE

## 2017-05-11 PROCEDURE — 99285 EMERGENCY DEPT VISIT HI MDM: CPT

## 2017-05-11 PROCEDURE — 70450 CT HEAD/BRAIN W/O DYE: CPT | Performed by: INTERNAL MEDICINE

## 2017-05-11 PROCEDURE — 93005 ELECTROCARDIOGRAM TRACING: CPT

## 2017-05-11 PROCEDURE — 85025 COMPLETE CBC W/AUTO DIFF WBC: CPT | Performed by: EMERGENCY MEDICINE

## 2017-05-11 PROCEDURE — 71010 XR CHEST AP PORTABLE  (CPT=71010): CPT | Performed by: EMERGENCY MEDICINE

## 2017-05-11 PROCEDURE — 80053 COMPREHEN METABOLIC PANEL: CPT | Performed by: EMERGENCY MEDICINE

## 2017-05-11 PROCEDURE — 84484 ASSAY OF TROPONIN QUANT: CPT | Performed by: EMERGENCY MEDICINE

## 2017-05-11 PROCEDURE — 80061 LIPID PANEL: CPT | Performed by: INTERNAL MEDICINE

## 2017-05-11 PROCEDURE — 36415 COLL VENOUS BLD VENIPUNCTURE: CPT

## 2017-05-11 PROCEDURE — 93010 ELECTROCARDIOGRAM REPORT: CPT

## 2017-05-11 RX ORDER — NITROGLYCERIN 0.4 MG/1
0.4 TABLET SUBLINGUAL EVERY 5 MIN PRN
Status: DISCONTINUED | OUTPATIENT
Start: 2017-05-11 | End: 2017-05-12

## 2017-05-11 RX ORDER — DIPHENOXYLATE HYDROCHLORIDE AND ATROPINE SULFATE 2.5; .025 MG/1; MG/1
1 TABLET ORAL 3 TIMES DAILY PRN
COMMUNITY
End: 2018-01-12

## 2017-05-11 RX ORDER — CAPECITABINE 500 MG/1
1000 TABLET, FILM COATED ORAL 2 TIMES DAILY
Status: DISCONTINUED | OUTPATIENT
Start: 2017-05-11 | End: 2017-05-12

## 2017-05-11 RX ORDER — SODIUM CHLORIDE 9 MG/ML
INJECTION, SOLUTION INTRAVENOUS CONTINUOUS
Status: DISCONTINUED | OUTPATIENT
Start: 2017-05-11 | End: 2017-05-12

## 2017-05-11 RX ORDER — SODIUM BICARBONATE 325 MG/1
650 TABLET ORAL 3 TIMES DAILY
Status: DISCONTINUED | OUTPATIENT
Start: 2017-05-11 | End: 2017-05-12

## 2017-05-11 RX ORDER — ENOXAPARIN SODIUM 100 MG/ML
40 INJECTION SUBCUTANEOUS DAILY
Status: DISCONTINUED | OUTPATIENT
Start: 2017-05-11 | End: 2017-05-12

## 2017-05-11 RX ORDER — ASPIRIN 81 MG/1
81 TABLET, CHEWABLE ORAL DAILY
Status: DISCONTINUED | OUTPATIENT
Start: 2017-05-11 | End: 2017-05-12

## 2017-05-11 RX ORDER — ACETAMINOPHEN 325 MG/1
650 TABLET ORAL EVERY 6 HOURS PRN
Status: DISCONTINUED | OUTPATIENT
Start: 2017-05-11 | End: 2017-05-12

## 2017-05-11 RX ORDER — DIPHENOXYLATE HYDROCHLORIDE AND ATROPINE SULFATE 2.5; .025 MG/1; MG/1
1 TABLET ORAL 3 TIMES DAILY PRN
Status: DISCONTINUED | OUTPATIENT
Start: 2017-05-11 | End: 2017-05-12

## 2017-05-11 RX ORDER — VITAMIN E 268 MG
400 CAPSULE ORAL DAILY
Status: DISCONTINUED | OUTPATIENT
Start: 2017-05-11 | End: 2017-05-12

## 2017-05-11 RX ORDER — ONDANSETRON 2 MG/ML
4 INJECTION INTRAMUSCULAR; INTRAVENOUS EVERY 6 HOURS PRN
Status: DISCONTINUED | OUTPATIENT
Start: 2017-05-11 | End: 2017-05-12

## 2017-05-11 RX ORDER — FOLIC ACID 1 MG/1
1 TABLET ORAL DAILY
Status: DISCONTINUED | OUTPATIENT
Start: 2017-05-11 | End: 2017-05-12

## 2017-05-11 NOTE — TELEPHONE ENCOUNTER
Patient called with complaints of left sided shoulder/upper chest pain, above the breast.. She states this has happened two times yesterday and this morning, resolved spontaneously. She is due for RT, taking xeloda.   Do not feel this is related to her t

## 2017-05-11 NOTE — TELEPHONE ENCOUNTER
Call transferred to RN. Pt reports having pain the last 2 nights, pain in the upper shoulder moving down her left arm. She took and ASA. She has had this in the past, and it was a mini stroke.    She contacted her PCP to have EKG as an outpatient, and t

## 2017-05-11 NOTE — CONSULTS
[address] Cardiology Consultation    Mariana Walker Patient Status:  Observation    1934 MRN [id number]   [facility] 8NE-A Attending Marianne Bynum, [address] Day # 0 PCP Jaja Lam MD     Reason for Consultation:  Left arm pain ILEOSTOMY/JEJUNOSTOMY,NONTUBE      OTHER SURGICAL HISTORY      Comment colon resection x 2    OTHER SURGICAL HISTORY  2003    Comment vulvectomy per Dr. Crews Graver HISTORY      Comment perirectal abcesses    OTHER SURGICAL HISTORY  10-07 70 68 84   Temp:  98.2 °F (36.8 °C)     TempSrc:  Oral     Resp: 16 18     Height:       Weight:  128 lb (58.06 kg)     SpO2: 98% 98%         Temp:  [98.2 °F (36.8 °C)-98.4 °F (36.9 °C)] 98.2 °F (36.8 °C)  Pulse:  [65-86] 84  Resp:  [16-18] 18  BP: (109-13

## 2017-05-11 NOTE — ED INITIAL ASSESSMENT (HPI)
Pt to ED with c/o CP that started 2 nights ago. Pain radiates to left arm. Denies pain at this time but sts her arm \"feels funny. \"

## 2017-05-11 NOTE — ED NOTES
Bedside shift report from [facility]. Patient updated on plan of care/plan to admit. Denies any needs at this time.

## 2017-05-11 NOTE — H&P
[facility] Hospitalist History and Physical      Patient presents with:  Chest Pain Angina (cardiovascular)       PCP: Kristy Mejía MD      History of Present Illness: Patient is a 80year old female with PMH sig for thallasemia, sjogrens syndrome, MONSERRAT, crohns d mucinous adenocarcinoma          Past Surgical History    APPENDECTOMY      D & C      COLONOSCOPY      Comment 6/09 no dysplasia.   Repeat 2014    COLONOSCOPY  5/13/2014    Comment Procedure: COLONOSCOPY;  Surgeon: Manuel Lester MD;  Location: [facility] EN equivalent per week         Comment: 1 glass of wine a month        Fam Hx  Family History   Problem Relation Age of Onset   • Cancer Sister      breast cancer   • Stroke Sister    • Heart Attack Sister    • Neurological Disorder Sister      dementia/organ cyanosis or edema. Skin: Skin color, texture, turgor normal. No rashes or lesions.     Neurologic: Moving all extremities spontaneously, no focal deficit appreciated     Data Review:    LABS:     Lab Results  Component Value Date   WBC 7.5 05/11/2017   HG MD            Mri Pelvis (soft Tissue) (cpt=72195)    4/11/2017  PROCEDURE:  MRI OF THE PELVIS WITHOUT CONTRAST  COMPARISON:  JAMIL , CT ABDOMEN+PELVIS(CONTRAST ONLY)(CPT=74177), 8/07/2015, 11:10.   JAMIL , CT DRAIN ABSCESS PERITONEAL (CPT=49406), 12/09/2 within the  deep subcutaneous fat of the medial right buttock. Imaging findings would be consistent with the clinical history of mucinous adenocarcinoma.     Dictated by: Carrillo Abarca MD on 4/11/2017 at 16:42     Approved by: Carrillo Abarca MD

## 2017-05-11 NOTE — ED PROVIDER NOTES
Patient Seen in: [facility] Emergency Department    History   Patient presents with:  Chest Pain Angina (cardiovascular)    Stated Complaint: CP    HPI    This is a pleasant 80-year-old female coming with complaints of chest pain.   Patient states fo Pawan Cordoba MD;  Location: [facility] ENDOSCOPY    COLECTOMY      ILEOSTOMY/JEJUNOSTOMY,NONTUBE      OTHER SURGICAL HISTORY      Comment colon resection x 2    OTHER SURGICAL HISTORY  2003    Comment vulvectomy per Dr. Troy Mackenzie CP  Other systems are as noted in HPI. Constitutional and vital signs reviewed. All other systems reviewed and negative except as noted above. PSFH elements reviewed from today and agreed except as otherwise stated in HPI.     Physical Exam       E Report.   Rate: 79  Rhythm: Sinus Rhythm  Reading: No areas of acute ST segment elevation or depression            MDM       She will be admitted for further evaluation    Disposition and Plan     Clinical Impression:  Chest pain, unspecified type  (primary

## 2017-05-11 NOTE — PLAN OF CARE
Patient/Family Goals    • Patient/Family Long Term Goal Progressing    • Patient/Family Short Term Goal Progressing          A/O x 4  Sent to CT scan after arrival to the unit  Oriented to the unit  Vital signs stable  troponins pending    Patient refusing

## 2017-05-12 ENCOUNTER — TELEPHONE (OUTPATIENT)
Dept: RADIATION ONCOLOGY | Facility: HOSPITAL | Age: 82
End: 2017-05-12

## 2017-05-12 ENCOUNTER — APPOINTMENT (OUTPATIENT)
Dept: CV DIAGNOSTICS | Facility: HOSPITAL | Age: 82
End: 2017-05-12
Attending: INTERNAL MEDICINE
Payer: MEDICARE

## 2017-05-12 VITALS
WEIGHT: 128 LBS | BODY MASS INDEX: 21.33 KG/M2 | SYSTOLIC BLOOD PRESSURE: 134 MMHG | TEMPERATURE: 98 F | OXYGEN SATURATION: 97 % | HEIGHT: 65 IN | HEART RATE: 82 BPM | RESPIRATION RATE: 16 BRPM | DIASTOLIC BLOOD PRESSURE: 62 MMHG

## 2017-05-12 PROCEDURE — 87081 CULTURE SCREEN ONLY: CPT | Performed by: INTERNAL MEDICINE

## 2017-05-12 PROCEDURE — 93018 CV STRESS TEST I&R ONLY: CPT | Performed by: INTERNAL MEDICINE

## 2017-05-12 PROCEDURE — 96374 THER/PROPH/DIAG INJ IV PUSH: CPT

## 2017-05-12 PROCEDURE — 77300 RADIATION THERAPY DOSE PLAN: CPT | Performed by: RADIOLOGY

## 2017-05-12 PROCEDURE — 77386 HC IMRT COMPLEX: CPT | Performed by: RADIOLOGY

## 2017-05-12 PROCEDURE — 78452 HT MUSCLE IMAGE SPECT MULT: CPT | Performed by: INTERNAL MEDICINE

## 2017-05-12 PROCEDURE — 93017 CV STRESS TEST TRACING ONLY: CPT | Performed by: INTERNAL MEDICINE

## 2017-05-12 PROCEDURE — 77338 DESIGN MLC DEVICE FOR IMRT: CPT | Performed by: RADIOLOGY

## 2017-05-12 NOTE — PROGRESS NOTES
[facility] Cardiology Progress Note        Vanda Kidd Patient Status:  Observation    1934 MRN [id number]   [facility] 8NE-A Attending Herbie Merino, [address] [facility] Day # 1 PCP MD Yamilka Linda JVD, carotids 2+ no bruits. Cardiac: Regular S1S2. No S3, S4, rub, click. 2/6 systolic   murmur. Lungs: Clear to auscultation and percussion. Abdomen: Soft, non-tender. Extremities: No LE edema. No clubbing or cyanosis.     Neurologic: Alert and rene

## 2017-05-12 NOTE — TELEPHONE ENCOUNTER
TC to ANOOP Ricci for update. Patient still off floor for stress test. Patient to be transferred to radiation dept after test for her daily treatment.

## 2017-05-12 NOTE — TELEPHONE ENCOUNTER
Talked with Alana Reyes, floor RN. Pt at stress test now. Currently planned time for radiation should be ok.    Stable to come for treatment today, per cardiology

## 2017-05-12 NOTE — PROGRESS NOTES
NURSING DISCHARGE NOTE    Discharged Home via Wheelchair. Accompanied by Spouse  Belongings Taken by patient/family. Pt alert and oriented. IV and tele removed. Discharge instructions and handouts given and discussed.  Medications and future appoint

## 2017-05-12 NOTE — PLAN OF CARE
CARDIOVASCULAR - ADULT    • Maintains optimal cardiac output and hemodynamic stability Progressing        GASTROINTESTINAL - ADULT    • Minimal or absence of nausea and vomiting Progressing    • Maintains adequate nutritional intake (undernourished) Progre

## 2017-05-12 NOTE — PROGRESS NOTES
[facility] Hospitalist Progress Note                                                                   [address]  7/28/1934    SUBJECTIVE: pt denies any further chest pain or arm pain/num no acute findings  -stress test wnl 5/12    #Left Arm Numbness - resolved  -check Ct brain - wnl    #Anemia/thallassemia -cont to monitor hg    #CKD- monitor BMP, cont bicarb    #Chronic Hyponatremia- mild, monitor    #Rectal cancer- on xeloda, currently c MULTI-VITAMIN DAILY Tabs        Take by mouth. Twice daily    Refills:  0       sodium bicarbonate 650 MG Tabs   Last time this was given:  650 mg on 5/12/2017  9:15 AM        Take 1 tablet (650 mg total) by mouth 3 (three) times daily.     Quantity:  60 ta

## 2017-05-15 ENCOUNTER — TELEPHONE (OUTPATIENT)
Dept: RADIATION ONCOLOGY | Facility: HOSPITAL | Age: 82
End: 2017-05-15

## 2017-05-15 ENCOUNTER — APPOINTMENT (OUTPATIENT)
Dept: RADIATION ONCOLOGY | Facility: HOSPITAL | Age: 82
End: 2017-05-15
Attending: RADIOLOGY
Payer: MEDICARE

## 2017-05-15 NOTE — TELEPHONE ENCOUNTER
Pt called and left a message to report that she is extremely weak, needing assistance to go to the bathroom. C/o pain in the left upper chest, and shoulder, and some SOB when she gets up.    She reports having some dry heaves, but is able to eat small amou

## 2017-05-15 NOTE — TELEPHONE ENCOUNTER
I contacted patient's , since she cancelled her appointment for today. He reports that she came home from the hospital very weak. She has been in bed all day. Starting to eat and drink more.    He says that they did 3 tests while she was in the [facility]

## 2017-05-15 NOTE — TELEPHONE ENCOUNTER
Tried home number, unable to leave a message, left a VM on her cell. Message left regarding xeloda, to make sure she is not taking at this time per Dr Shemar Bowser. Asked to call me back.

## 2017-05-16 ENCOUNTER — HOSPITAL ENCOUNTER (INPATIENT)
Facility: HOSPITAL | Age: 82
LOS: 12 days | Discharge: HOME | DRG: 394 | End: 2017-05-28
Attending: EMERGENCY MEDICINE | Admitting: INTERNAL MEDICINE
Payer: MEDICARE

## 2017-05-16 ENCOUNTER — APPOINTMENT (OUTPATIENT)
Dept: RADIATION ONCOLOGY | Facility: HOSPITAL | Age: 82
End: 2017-05-16
Attending: RADIOLOGY
Payer: MEDICARE

## 2017-05-16 ENCOUNTER — APPOINTMENT (OUTPATIENT)
Dept: HEMATOLOGY/ONCOLOGY | Facility: HOSPITAL | Age: 82
End: 2017-05-16
Attending: INTERNAL MEDICINE
Payer: MEDICARE

## 2017-05-16 DIAGNOSIS — N18.9 ANEMIA IN CHRONIC RENAL DISEASE: ICD-10-CM

## 2017-05-16 DIAGNOSIS — K61.1 RECTAL ABSCESS: ICD-10-CM

## 2017-05-16 DIAGNOSIS — R19.8 HIGH OUTPUT ILEOSTOMY (HCC): ICD-10-CM

## 2017-05-16 DIAGNOSIS — C20 RECTAL CANCER (HCC): ICD-10-CM

## 2017-05-16 DIAGNOSIS — K50.114 CROHN'S COLITIS, WITH ABSCESS (HCC): ICD-10-CM

## 2017-05-16 DIAGNOSIS — D63.1 ANEMIA IN CHRONIC RENAL DISEASE: ICD-10-CM

## 2017-05-16 DIAGNOSIS — R11.0 NAUSEA: ICD-10-CM

## 2017-05-16 DIAGNOSIS — R19.7 DIARRHEA, UNSPECIFIED TYPE: ICD-10-CM

## 2017-05-16 DIAGNOSIS — N30.00 ACUTE CYSTITIS WITHOUT HEMATURIA: ICD-10-CM

## 2017-05-16 DIAGNOSIS — Z93.2 HIGH OUTPUT ILEOSTOMY (HCC): ICD-10-CM

## 2017-05-16 DIAGNOSIS — E86.0 DEHYDRATION: ICD-10-CM

## 2017-05-16 DIAGNOSIS — N17.9 ACUTE RENAL FAILURE, UNSPECIFIED ACUTE RENAL FAILURE TYPE (HCC): Primary | ICD-10-CM

## 2017-05-16 DIAGNOSIS — D56.8 OTHER THALASSEMIA (HCC): ICD-10-CM

## 2017-05-16 DIAGNOSIS — B37.9 CANDIDIASIS: ICD-10-CM

## 2017-05-16 PROBLEM — E87.5 HYPERKALEMIA: Status: ACTIVE | Noted: 2017-05-16

## 2017-05-16 PROBLEM — R73.9 HYPERGLYCEMIA: Status: ACTIVE | Noted: 2017-05-16

## 2017-05-16 PROBLEM — R79.89 AZOTEMIA: Status: ACTIVE | Noted: 2017-05-16

## 2017-05-16 PROBLEM — D64.9 ANEMIA: Status: ACTIVE | Noted: 2017-05-16

## 2017-05-16 PROBLEM — E87.1 HYPONATREMIA: Status: ACTIVE | Noted: 2017-05-16

## 2017-05-16 PROCEDURE — 05H633Z INSERTION OF INFUSION DEVICE INTO LEFT SUBCLAVIAN VEIN, PERCUTANEOUS APPROACH: ICD-10-PCS | Performed by: HOSPITALIST

## 2017-05-16 PROCEDURE — B547ZZA ULTRASONOGRAPHY OF LEFT SUBCLAVIAN VEIN, GUIDANCE: ICD-10-PCS | Performed by: HOSPITALIST

## 2017-05-16 RX ORDER — ONDANSETRON 4 MG/1
4 TABLET, ORALLY DISINTEGRATING ORAL ONCE
Status: COMPLETED | OUTPATIENT
Start: 2017-05-16 | End: 2017-05-16

## 2017-05-16 RX ORDER — ONDANSETRON 2 MG/ML
4 INJECTION INTRAMUSCULAR; INTRAVENOUS EVERY 6 HOURS PRN
Status: DISCONTINUED | OUTPATIENT
Start: 2017-05-16 | End: 2017-05-21

## 2017-05-16 RX ORDER — SODIUM CHLORIDE 9 MG/ML
INJECTION, SOLUTION INTRAVENOUS CONTINUOUS
Status: DISCONTINUED | OUTPATIENT
Start: 2017-05-16 | End: 2017-05-17

## 2017-05-16 RX ORDER — ACETAMINOPHEN 325 MG/1
650 TABLET ORAL EVERY 6 HOURS PRN
Status: DISCONTINUED | OUTPATIENT
Start: 2017-05-16 | End: 2017-05-28

## 2017-05-16 RX ORDER — ONDANSETRON 4 MG/1
TABLET, ORALLY DISINTEGRATING ORAL
Status: DISPENSED
Start: 2017-05-16 | End: 2017-05-16

## 2017-05-16 RX ORDER — SODIUM CHLORIDE 9 MG/ML
INJECTION, SOLUTION INTRAVENOUS ONCE
Status: COMPLETED | OUTPATIENT
Start: 2017-05-16 | End: 2017-05-16

## 2017-05-16 RX ORDER — HEPARIN SODIUM 5000 [USP'U]/ML
5000 INJECTION, SOLUTION INTRAVENOUS; SUBCUTANEOUS EVERY 8 HOURS SCHEDULED
Status: DISCONTINUED | OUTPATIENT
Start: 2017-05-16 | End: 2017-05-28

## 2017-05-16 RX ORDER — ONDANSETRON 2 MG/ML
4 INJECTION INTRAMUSCULAR; INTRAVENOUS ONCE
Status: COMPLETED | OUTPATIENT
Start: 2017-05-16 | End: 2017-05-16

## 2017-05-16 RX ORDER — SODIUM CHLORIDE 9 MG/ML
INJECTION, SOLUTION INTRAVENOUS CONTINUOUS
Status: ACTIVE | OUTPATIENT
Start: 2017-05-16 | End: 2017-05-16

## 2017-05-16 RX ORDER — SODIUM CHLORIDE 0.9 % (FLUSH) 0.9 %
10 SYRINGE (ML) INJECTION EVERY 12 HOURS
Status: DISCONTINUED | OUTPATIENT
Start: 2017-05-16 | End: 2017-05-28

## 2017-05-16 NOTE — ED INITIAL ASSESSMENT (HPI)
Nausea, vomiting x3 days. Last chemo was Saturday am.  Hospitalized last week for nausea, vomiting.   Has ileostomy

## 2017-05-16 NOTE — H&P
[facility] Hospitalist History and Physical      CC: N/V    PCP: Jeremiah Prasad MD    History of Present Illness: Patient is a 80year old female with PMH sig for thallasemia, sjogrens, MONSERRAT, chrons, CKD, anxiety, HTN, GERD, IBS and rectal CA on Xeloda presenting t to Encounter:  diphenoxylate-atropine 2.5-0.025 MG Oral Tab Take 1 tablet by mouth 3 (three) times daily as needed for Diarrhea. Disp:  Rfl:    capecitabine 500 MG Oral tab Take 2 tablets (1,000 mg total) by mouth 2 (two) times daily.  Take twice a day duri 05/16/2017    05/16/2017   K 5.3 05/16/2017    05/16/2017   CO2 14.0 05/16/2017    05/16/2017   CA 10.4 05/16/2017   ALB 3.6 05/16/2017   ALKPHO 151 05/16/2017   BILT 0.9 05/16/2017   TP 7.7 05/16/2017   AST 13 05/16/2017   ALT 24 05/16/

## 2017-05-16 NOTE — ED PROVIDER NOTES
Patient Seen in: [facility] Emergency Department    History   Patient presents with:  Nausea/Vomiting/Diarrhea (gastrointestinal)    Stated Complaint: NV    HPI    80-year-old woman with a history of adenocarcinoma of the rectal canal currently rece SURGICAL HISTORY      Comment colon resection x 2    OTHER SURGICAL HISTORY  2003    Comment vulvectomy per Dr. Zach Ag perirectal abcesses    OTHER SURGICAL HISTORY  10-07    Comment hip fracture    APPENDECTOMY 0740 18   Temp 05/16/17 0740 98 °F (36.7 °C)   Temp src 05/16/17 0740 Temporal   SpO2 05/16/17 0740 98 %   O2 Device 05/16/17 0740 None (Room air)       Current:/55 mmHg  Pulse 90  Temp(Src) 98 °F (36.7 °C) (Temporal)  Resp 18  Ht 165.1 cm (5' 5\") ---------                               -----------         ------                     CBC W/ DIFFERENTIAL[[id number]]          Abnormal            Final result                 Please view results for these tests on the individual orders.    SCAN SLIDE   R

## 2017-05-17 ENCOUNTER — APPOINTMENT (OUTPATIENT)
Dept: HEMATOLOGY/ONCOLOGY | Facility: HOSPITAL | Age: 82
End: 2017-05-17
Attending: INTERNAL MEDICINE
Payer: MEDICARE

## 2017-05-17 PROCEDURE — 99223 1ST HOSP IP/OBS HIGH 75: CPT | Performed by: INTERNAL MEDICINE

## 2017-05-17 PROCEDURE — 99222 1ST HOSP IP/OBS MODERATE 55: CPT | Performed by: INTERNAL MEDICINE

## 2017-05-17 RX ORDER — SODIUM BICARBONATE 325 MG/1
650 TABLET ORAL 3 TIMES DAILY
Status: DISCONTINUED | OUTPATIENT
Start: 2017-05-17 | End: 2017-05-19

## 2017-05-17 RX ORDER — LOPERAMIDE HYDROCHLORIDE 2 MG/1
2 CAPSULE ORAL AS NEEDED
Status: DISCONTINUED | OUTPATIENT
Start: 2017-05-17 | End: 2017-05-18 | Stop reason: DRUGHIGH

## 2017-05-17 RX ORDER — DIPHENOXYLATE HYDROCHLORIDE AND ATROPINE SULFATE 2.5; .025 MG/1; MG/1
1 TABLET ORAL 3 TIMES DAILY PRN
Status: DISCONTINUED | OUTPATIENT
Start: 2017-05-17 | End: 2017-05-19

## 2017-05-17 RX ORDER — TRAZODONE HYDROCHLORIDE 50 MG/1
25 TABLET ORAL NIGHTLY PRN
Status: DISCONTINUED | OUTPATIENT
Start: 2017-05-17 | End: 2017-05-28

## 2017-05-17 NOTE — PLAN OF CARE
Patient received from ER via stretcher, alert and oriented. Lungs clear, diminished, ostomy to right side of abdomen, appliance CDI. Abdomen soft, tender, bowel sounds present. Voiding adequate amounts, asymptomatic.  Ashley/vaginal area red and excoriated fr

## 2017-05-17 NOTE — PLAN OF CARE
Patient received this am alert and oriented, resting in bed. Lungs clear, diminished on room air, denies SOB, occasional non-productive cough.  Voiding adequate amounts of urine, asymptomatic, ostomy appliance CDI, emptied for moderate amount of liquid stoo

## 2017-05-17 NOTE — CONSULTS
[facility]    PATIENT'S NAME: SHANNON/Davide Guerreroafshan BULLOCK   ATTENDING PHYSICIAN: HERMINIA Ochoais: Thad Alba M.D.    PATIENT ACCOUNT#:   [de-identified]    LOCATION:  [address] [place] [address]  MEDICAL RECORD #:   [id number]       DATE Darrion Pitt large amount of liquid stool in her ileostomy bag. PAST MEDICAL HISTORY:  Notable for a history of Crohn's disease. She has a history of chronic kidney disease with creatinine in the 2s, and she sees Dr. Tommie Calix.   She has a history of beta thalassemia tra review of systems is unremarkable with pertinent positives and negatives in the HPI. PHYSICAL EXAMINATION:    GENERAL:  She is a frail-appearing female in no acute distress. VITAL SIGNS:  Her performance status is currently 3.   Her weight is 119 robert about retirement through the planned course. 2.   Acute kidney injury on top of chronic kidney disease that is due to volume contraction:  It appears to be improving with hydration. Dr. Sherren Bellis is very familiar with her. 3.   Crohn's disease:   This may be c

## 2017-05-17 NOTE — DIETARY MALNUTRITION NOTE
NUTRITION INITIAL ASSESSMENT    Pt is at moderate nutrition risk. Pt meets malnutrition criteria.     NUTRITION DIAGNOSIS/PROBLEM:    Malnutrition related to inability to consume sufficient energy as evidenced by pt reports poor appetite/po, pt with 8 lbs w oz)      NUTRITION:  Diet: Low fiber/soft  Oral Supplements: enlive TID    FOOD/NUTRITION RELATED HISTORY:  Appetite: Poor  Intake: <50%  Intake Meeting Needs: No  Food Allergies: MSG  Cultural/Ethnic/Congregation Preferences Addresses: Yes    NUTRITION RELAT

## 2017-05-17 NOTE — PLAN OF CARE
Pt alert and oriented this shift, ambulatory to the bathroom independently. She has no complaints of pain this shift, she does have a history of drea. She does not wear a cpap at home and is refusing here. Prn zofran given at hs for complaints of nausea.

## 2017-05-17 NOTE — PROGRESS NOTES
Full consult dictated. 80year old with underlying crohn's who was found to have adenocarcinoma in a large pelvic abscess cavity. Not deemed surgical candidate without radical extenteration and she declined.   Receiving palliative RT to pelvis with concur

## 2017-05-17 NOTE — PROGRESS NOTES
[facility] Hospitalist Progress Note                                                                   [facility] KOBE Sweet Huge  7/28/1934    CC: FU diarrhea, N/V    Interval History:  - Feels better to 5. 13  3.39   WBC  6.9  5.0   PLT  217.0  186.0     Recent Labs   Lab  05/16/17   1228  05/17/17   0608   GLU  130*  111*   BUN  91*  86*   CREATSERUM  4.54*  3.69*   CA  10.4*  9.8   NA  131*  138   K  5.3*  4.7   CL  103  112*   CO2  14.0*  13.0*

## 2017-05-17 NOTE — CONSULTS
[facility]  Report of Consultation    Weir Egnar Patient Status:  Inpatient    1934 MRN [id number]   [facility] 4NW-A Attending Sharona Pena,*   Hosp Day # 1 PCP Ulysses Conner, MD       Assessment / Plan:    1) organism [id number](283)    • Hearing impairment    • Peripheral vascular disease (HCC)    • Renal disorder      ckd   • Crohn disease (HCC)    • High blood pressure    • Esophageal reflux    • IBS (irritable bowel syndrome)    • Anxiety state    • Cancer Rash    Medications:    Current facility-administered medications:   •  nystatin (MYCOSTATIN) suspension 500,000 Units, 5 mL, Oral, QID  •  sodium bicarbonate tab 650 mg, 650 mg, Oral, TID  •  diphenoxylate-atropine (LOMOTIL) 2.5-0.025 MG per tab 1 tablet, Decreased breath sounds at the bases bilaterally.    Abdomen: Soft, non-tender. + bowel sounds, no palpable organomegaly  Extremities: Without clubbing, cyanosis; no edema  Neurologic: Cranial nerves grossly intact, moving all extremities  Skin: Warm and dr

## 2017-05-17 NOTE — CM/SW NOTE
05/17/17 1100   CM/SW Referral Data   Referral Source Social Work (self-referral)   Reason for Referral Discharge planning   Informant Patient   Patient Info   Patient's Mental Status Alert;Oriented   Patient's [address]   Number of Levels

## 2017-05-18 ENCOUNTER — APPOINTMENT (OUTPATIENT)
Dept: RADIATION ONCOLOGY | Facility: HOSPITAL | Age: 82
End: 2017-05-18
Attending: RADIOLOGY
Payer: MEDICARE

## 2017-05-18 PROCEDURE — 99232 SBSQ HOSP IP/OBS MODERATE 35: CPT | Performed by: INTERNAL MEDICINE

## 2017-05-18 PROCEDURE — 99233 SBSQ HOSP IP/OBS HIGH 50: CPT | Performed by: INTERNAL MEDICINE

## 2017-05-18 RX ORDER — POTASSIUM CHLORIDE 29.8 MG/ML
40 INJECTION INTRAVENOUS ONCE
Status: DISCONTINUED | OUTPATIENT
Start: 2017-05-18 | End: 2017-05-18 | Stop reason: SDUPTHER

## 2017-05-18 RX ORDER — DIPHENOXYLATE HYDROCHLORIDE AND ATROPINE SULFATE 2.5; .025 MG/1; MG/1
2 TABLET ORAL 4 TIMES DAILY
Status: DISCONTINUED | OUTPATIENT
Start: 2017-05-18 | End: 2017-05-28

## 2017-05-18 RX ORDER — LOPERAMIDE HYDROCHLORIDE 2 MG/1
CAPSULE ORAL 4 TIMES DAILY PRN
Status: DISCONTINUED | OUTPATIENT
Start: 2017-05-18 | End: 2017-05-20

## 2017-05-18 NOTE — PROGRESS NOTES
Per infectious control, patient is cleared of MRSA infection. Contact isolation discontinued. Patient updated.

## 2017-05-18 NOTE — PLAN OF CARE
Patient alert and oriented x4. Patient denies pain this morninig. Patient with potassium of 3.3, but refusing potassium replacement. Patient states \"I will at a banana for lunch and I'll be fine. \" Patient with excoriation to giovanny area from radiation.  Chris David

## 2017-05-18 NOTE — CM/SW NOTE
Pt requesting Santhosh Simons for radiation burns. Completed face to face. Sent referral to [facility] via [address]. Madhu able to accept. Informed them the pt may dc tomorrow. SW to follow up.

## 2017-05-18 NOTE — PROGRESS NOTES
[facility] Hospitalist Progress Note                                                                   [facility]  Pt seen and examined 5/18/2017    [place] [facility]  7/28/1934    CC: MALISSA diarrhea, N/V    Inte rashes or lesions.   MSK:  No digit cyanosis  Psych: AAO x 3, with appropriate affect    Pertinent Labs:   Recent Labs   Lab  05/16/17   1228  05/17/17   0608  05/18/17   0640   RBC  5.33*  4.07  3.82   HGB  11.1*  8.7*  8.1*   HCT  33.1*  25.3*  23.2*   MC NS    FEN:  - IVF  - Diet as tolerated  - Lytes prn    Proph:  - DVT proph with heparin    Dispo:  - [facility] hospitalist service  - Consults include: Onc, renal    Reviewed chart including previous progress notes    Steve Weinstein MD  [facility] Hospitalist  [phone]

## 2017-05-18 NOTE — PROGRESS NOTES
[facility]  Nephrology Progress Note    Cris Olvera Attending:   Shyam Mensah MD       Assessment and Plan:    1) TANIA- due to dehydration with poor PO intake / vomiting / increasing ostomy output (emptying bag every 2 hrs); no other jason Results  Component Value Date   WBC 4.3 05/18/2017   HGB 8.1 05/18/2017   HCT 23.2 05/18/2017   .0 05/18/2017   CREATSERUM 2.86 05/18/2017   BUN 65 05/18/2017    05/18/2017   K 3.3 05/18/2017    05/18/2017   CO2 28.0 05/18/2017

## 2017-05-18 NOTE — PLAN OF CARE
NEUROLOGICAL - ADULT    • Achieves stable or improved neurological status Not Progressing      Pt alert and oriented this shift, anxious about hospital stay and  being home alone.  Md notified with new orders received and carried out, please refer to

## 2017-05-18 NOTE — PROGRESS NOTES
Heme/Onc Progress Note    Patient Name: Crystal Mora   YOB: 1934   Medical Record Number: [id number]   CSN: [id number]   Attending Physician: Jerod Herrera M.D. Subjective:  Still not well. No fever or abdominal pain.   Rare gag Other Encounters:   •  Darbepoetin [place] (ARANESP) 300 MCG/0.6ML injection 300 mcg, 300 mcg, Subcutaneous, Q30 Days    Physical Examination:  General: Patient is alert and oriented x 3, not in acute distress. Appears weak.   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 C diff is negative. OK to get more aggressive with antimotility agents. Plan scheduled lomotil QID and add imodium as PRN. May need tincture of opium as well.   Continue aggressive hydration and low residue

## 2017-05-19 ENCOUNTER — APPOINTMENT (OUTPATIENT)
Dept: RADIATION ONCOLOGY | Facility: HOSPITAL | Age: 82
End: 2017-05-19
Attending: RADIOLOGY
Payer: MEDICARE

## 2017-05-19 ENCOUNTER — APPOINTMENT (OUTPATIENT)
Dept: GENERAL RADIOLOGY | Facility: HOSPITAL | Age: 82
DRG: 394 | End: 2017-05-19
Attending: INTERNAL MEDICINE
Payer: MEDICARE

## 2017-05-19 PROCEDURE — 71010 XR CHEST AP PORTABLE  (CPT=71010): CPT | Performed by: INTERNAL MEDICINE

## 2017-05-19 PROCEDURE — 99233 SBSQ HOSP IP/OBS HIGH 50: CPT | Performed by: INTERNAL MEDICINE

## 2017-05-19 PROCEDURE — 99232 SBSQ HOSP IP/OBS MODERATE 35: CPT | Performed by: INTERNAL MEDICINE

## 2017-05-19 RX ORDER — SODIUM CHLORIDE 450 MG/100ML
INJECTION, SOLUTION INTRAVENOUS CONTINUOUS
Status: DISCONTINUED | OUTPATIENT
Start: 2017-05-19 | End: 2017-05-21

## 2017-05-19 RX ORDER — POTASSIUM CHLORIDE 20 MEQ/1
20 TABLET, EXTENDED RELEASE ORAL ONCE
Status: COMPLETED | OUTPATIENT
Start: 2017-05-19 | End: 2017-05-19

## 2017-05-19 NOTE — PLAN OF CARE
Remains anxious , refusing some meds. Tylenol for complaints of generalized aches, temp 99. Ileostomy draining liquid stool . imodium given.

## 2017-05-19 NOTE — PROGRESS NOTES
[facility] Hospitalist Progress Note                                                                   [facility]  Pt seen and examined 5/18/2017    Rom Lundberg  7/28/1934    CC: MALISSA diarrhea, N/V    Inte 05/18/17   0640  05/19/17   0607   RBC  4.07  3.82  3.77*   HGB  8.7*  8.1*  7.9*   HCT  25.3*  23.2*  24.3*   MCV  62.2*  60.7*  64.5*   MCH  21.4*  21.2*  21.0*   MCHC  34.4  34.9  32.5   RDW  16.8*  16.8*  16.1*   NEPRELIM  3.39  2.88  4.19   WBC  5.0 heparin    Dispo:  [facility] hospitalist service  - Consults include: Onc, renal    D/w RN MD Michael Ybarra  [phone]  5/19/2017  1:58 PM

## 2017-05-19 NOTE — PROGRESS NOTES
[facility]  Nephrology Progress Note    Santo Calzada Attending:   Donna Lozoya MD       Assessment and Plan:    1) TANIA- due to dehydration with poor PO intake / vomiting / increasing ostomy output (emptying bag every 2 hrs); no other jason Cranial nerves grossly intact, moving all extremities  Skin: Warm and dry, no rashes       Labs:     Lab Results  Component Value Date   WBC 5.6 05/19/2017   HGB 7.9 05/19/2017   HCT 24.3 05/19/2017   .0 05/19/2017   CREATSERUM 2.71 05/19/2017   BUN

## 2017-05-19 NOTE — PROGRESS NOTES
Heme/Onc Progress Note    Patient Name: Rik Mukherjee   YOB: 1934   Medical Record Number: [id number]   CSN: [id number]   Attending Physician: Hussein Sloan M.D. Subjective:  Still with large stool output.   Hadn't taken much anti is alert and oriented x 3, not in acute distress. Still appears weak  Vital Signs: /47 mmHg  Pulse 94  Temp(Src) 99.5 °F (37.5 °C) (Oral)  Resp 16  Ht 1.651 m (5' 5\")  Wt 54.3 kg (119 lb 11.4 oz)  BMI 19.92 kg/m2  SpO2 93%  HEENT: EOMs intact.  PERRL Result Value Ref Range   Neutrophil Absolute Manual 4.54 1.30-6.70 x10(3) uL   Lymphocyte Absolute Manual 0.34 (L) 0.90-4.00 x10(3) uL   Monocyte Absolute Manual 0.45 0.10-0.60 x10(3) uL   Eosinophil Absolute Manual 0.00 0.00-0.30 x10(3) uL   Basophil Ab

## 2017-05-19 NOTE — PLAN OF CARE
GASTROINTESTINAL - ADULT    • Minimal or absence of nausea and vomiting Not Progressing          NEUROLOGICAL - ADULT    • Achieves stable or improved neurological status Progressing        RESPIRATORY - ADULT    • Achieves optimal ventilation and oxygenat

## 2017-05-20 PROCEDURE — 99232 SBSQ HOSP IP/OBS MODERATE 35: CPT | Performed by: INTERNAL MEDICINE

## 2017-05-20 RX ORDER — LOPERAMIDE HYDROCHLORIDE 2 MG/1
4 CAPSULE ORAL EVERY 6 HOURS
Status: DISCONTINUED | OUTPATIENT
Start: 2017-05-20 | End: 2017-05-28

## 2017-05-20 NOTE — PROGRESS NOTES
[facility]  Nephrology Progress Note    Mariana Walker Patient Status:  Inpatient    1934 MRN [id number]   [facility] 4NW-A Attending Collin Sen MD   Hosp Day # 4 PCP Jaja Lam MD       SUBJECTIVE:  Teri Purcell results for input(s): PGLU in the last 72 hours.     Meds:     Current Facility-Administered Medications:  Loperamide HCl (IMODIUM) cap 4 mg 4 mg Oral Q6H   meropenem (MERREM) IVPB 500 mg/100 ml in 0.9% NaCl minibag 500 mg Intravenous Q12H   epoetin rex (E

## 2017-05-20 NOTE — PROGRESS NOTES
Alert,oriented. No temp reported this shift. Loose stool in colostomy seen. Ashley-anal area remains reddened,Kenalog  Cream applied per MD orders. Resting quietly this shift,will continue to monitor.

## 2017-05-20 NOTE — PHYSICAL THERAPY NOTE
Chart reviewed. Pt declining PT services due to nausea and vomiting. Refusing all OOB activity. Will attempt to see later as schedule permits.

## 2017-05-20 NOTE — PLAN OF CARE
GASTROINTESTINAL - ADULT    • Minimal or absence of nausea and vomiting  Poor appetite. Nausea at times. Medicated w/zofran as needed. Ileostomy putting out large amts watery stool. On scheduled lomotil and immodium.  Progressing        NEUROLOGICAL - ADUL

## 2017-05-20 NOTE — CONSULTS
[facility] Note:  Renal Adjustment for Merrem (meropenem)    James [place] is a 80year old female who has been prescribed Merrem (meropenem) 500 mg every 8 hrs. CrCl is estimated creatinine clearance is 16 mL/min (based on Cr of 2.33).  so the [facility]

## 2017-05-20 NOTE — PROGRESS NOTES
Hematology/Oncology Progress Note    Patient Name: Crystal Mora  Medical Record Number: [id number]    YOB: 1934     Reason for Consultation:  Crystal Mora was seen today for the diagnosis of rectal cancer, anemia, diarrhea    In appropriate  Eyes: EOMI, PERRL  ENT: Oropharynx is clear, no adenopathy  CV: Regular rate and rhythm, no murmurs, no LE edema  Respiratory: Lungs clear to auscultation bilaterally  GI/Abd: Soft, non-tender with normoactive bowel sounds, no hepatosplenomega adequate   -monitor CBC, transfuse for hgb <7    *TANIA on CKD  -cre improved with hydration. Renal following  -Replace lytes prn    *nausea  -pt hasn't been taking prn zofran. Instructed her to do so. Reassess tomorrow    *fever  -U/A concerning for UTI.

## 2017-05-20 NOTE — PROGRESS NOTES
[facility] Hospitalist Progress Note                                                                   [facility]  Pt seen and examined 5/18/2017    Naeem Velez  7/28/1934    CC: MALISSA diarrhea, N/V    Inte 0521   RBC  3.82  3.77*  3.89   HGB  8.1*  7.9*  8.2*   HCT  23.2*  24.3*  25.4*   MCV  60.7*  64.5*  65.3*   MCH  21.2*  21.0*  21.1*   MCHC  34.9  32.5  32.3   RDW  16.8*  16.1*  16.4*   NEPRELIM  2.88  4.19  6.87*   WBC  4.3  5.6  8.4   PLT  180.0  195. as tolerated  - Lytes prn    Proph:  - DVT proph with heparin    Dispo:  - [facility] hospitalist service  - Consults include: Onc, renal    D/w ANOOP Santiago and Dr. Amy Olmedo MD  [facility] Hospitalist  [phone]  5/20/2017  3:35 PM

## 2017-05-21 ENCOUNTER — APPOINTMENT (OUTPATIENT)
Dept: GENERAL RADIOLOGY | Facility: HOSPITAL | Age: 82
DRG: 394 | End: 2017-05-21
Attending: INTERNAL MEDICINE
Payer: MEDICARE

## 2017-05-21 PROCEDURE — 99232 SBSQ HOSP IP/OBS MODERATE 35: CPT | Performed by: INTERNAL MEDICINE

## 2017-05-21 PROCEDURE — 71020 XR CHEST PA + LAT CHEST (CPT=71020): CPT | Performed by: INTERNAL MEDICINE

## 2017-05-21 RX ORDER — SODIUM CHLORIDE 9 MG/ML
INJECTION, SOLUTION INTRAVENOUS CONTINUOUS
Status: DISCONTINUED | OUTPATIENT
Start: 2017-05-21 | End: 2017-05-26

## 2017-05-21 RX ORDER — OCTREOTIDE ACETATE 100 UG/ML
100 INJECTION, SOLUTION INTRAVENOUS; SUBCUTANEOUS EVERY 8 HOURS
Status: DISCONTINUED | OUTPATIENT
Start: 2017-05-21 | End: 2017-05-28

## 2017-05-21 RX ORDER — OCTREOTIDE ACETATE 50 UG/ML
100 INJECTION, SOLUTION INTRAVENOUS; SUBCUTANEOUS EVERY 8 HOURS
Status: DISCONTINUED | OUTPATIENT
Start: 2017-05-21 | End: 2017-05-21 | Stop reason: RX

## 2017-05-21 RX ORDER — ONDANSETRON 2 MG/ML
4 INJECTION INTRAMUSCULAR; INTRAVENOUS EVERY 8 HOURS
Status: DISCONTINUED | OUTPATIENT
Start: 2017-05-21 | End: 2017-05-28

## 2017-05-21 NOTE — PHYSICAL THERAPY NOTE
PHYSICAL THERAPY EVALUATION - INPATIENT     Room Number: 406/406-A  Evaluation Date: 5/21/2017  Type of Evaluation: Initial  Physician Order: PT Eval and Treat    Presenting Problem: deconditioned, c/o N/V, rectal CA received chemo and radiation  Reaso [facility] ([facility]    • High blood pressure    • Esophageal reflux    • IBS (irritable bowel syndrome)    • Anxiety state    • Cancer ([facility]      vulva 14 years ago, tx with surgery alone.    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 RN made aware of session.     Exercise/Education Provided:  Bed mobility  Energy conservation  Functional activity tolerated  Gait training  Transfer training    Patient End of Session: In bed

## 2017-05-21 NOTE — PROGRESS NOTES
Hematology/Oncology Progress Note    Patient Name: Ludmila Heredia  Medical Record Number: [id number]    YOB: 1934     Reason for Consultation:  Ludmila Heredia was seen today for the diagnosis of rectal cancer, anemia, diarrhea    In oriented, not in acute distress  CV: Regular rate and rhythm, no murmurs, no LE edema  Respiratory: Lungs clear to auscultation bilaterally  GI/Abd: Soft, non-tender with normoactive bowel sounds, no hepatosplenomegaly.  + ostomy with green liquid watery st and imodium to prn. Pt will not ask for any prns even if she needs to therefore keeping on a scheduled basis for now.   -continue IVF hydration    *anemia- due to beta thal trait, chemotherapy, CKD  -continue EPO 3 x weekly for now.   Ferritin adequate   -

## 2017-05-21 NOTE — PROGRESS NOTES
[facility]  Nephrology Progress Note    Medardo Wilhelm Patient Status:  Inpatient    1934 MRN [id number]   [facility] 4NW-A Attending Joe Villaseñor MD   Hosp Day # 5 PCP Clarence Bianchi MD       SUBJECTIVE:  Notes ongoin in the last 72 hours.     Meds:     Current Facility-Administered Medications:  Loperamide HCl (IMODIUM) cap 4 mg 4 mg Oral Q6H   meropenem (MERREM) IVPB 500 mg/100 ml in 0.9% NaCl minibag 500 mg Intravenous Q12H   epoetin rex (EPOGEN,PROCRIT) injection 10

## 2017-05-21 NOTE — PROGRESS NOTES
[facility] Hospitalist Progress Note                                                                   [facility] A Ivone Queta  7/28/1934    CC: FU diarrhea, N/V    Interval History:  Pt laying in bed.  Marsha Crenshaw 05/20/17   0521  05/21/17   0553   RBC  3.77*  3.89  3.92   HGB  7.9*  8.2*  8.2*   HCT  24.3*  25.4*  25.8*   MCV  64.5*  65.3*  65.8*   MCH  21.0*  21.1*  20.9*   MCHC  32.5  32.3  31.8   RDW  16.1*  16.4*  16.0   NEPRELIM  4.19  6.87*  5.00   WBC  5.6 renal    Jose Angel Patient, MD Michael Corrales  [phone]  5/21/2017  4:26 PM

## 2017-05-21 NOTE — OCCUPATIONAL THERAPY NOTE
OCCUPATIONAL THERAPY EVALUATION - INPATIENT     Room Number: 406/406-A  Evaluation Date: 5/21/2017  Type of Evaluation: Initial  Presenting Problem: A renal F    Physician Order: IP Consult to Occupational Therapy  Reason for Therapy: ADL/IADL Dysfunction child   • Pneumonia, organism unspecified(486)    • Hearing impairment    • Peripheral vascular disease ([facility]    • Renal disorder      ckd   • Crohn disease ([facility]    • High blood pressure    • Esophageal reflux    • IBS (irritable bowel syndrome)    • Anxi WFL      ADDITIONAL TESTS                 PHQ9: Yes                  NEUROLOGICAL FINDINGS                   ACTIVITY TOLERANCE  Shortness of breath    ACTIVITIES OF DAILY LIVING ASSESSMENT  AM-PAC ‘6-Clicks’ Inpatient Daily Activity Short Form  How much h above deficits, maximizing patient's ability to return to prior level of function.     OT Discharge Recommendations: Home with home health PT  OT Device Recommendations: Reacher;Sock aid    PLAN  OT Treatment Plan: Energy conservation/work simplification te

## 2017-05-21 NOTE — CM/SW NOTE
SW met w/pt regarding PHQ4. Pt stated her depression is \"not bad. \" Pt stated she feels anxious and depressed because she has cancer. Pt stated she did not know if she was taking medication for her depression because she \"takes too many pills. \" Pt state

## 2017-05-21 NOTE — PROGRESS NOTES
Drowsy this shift. Ileostomy with large amounts of liquid brown stool. No complaint of pain,Alternating Imodium with  Lomotil. IV antibiotics given per MD orders.

## 2017-05-21 NOTE — PLAN OF CARE
GASTROINTESTINAL - ADULT    • Maintains or returns to baseline bowel function Not Progressing          GASTROINTESTINAL - ADULT    • Minimal or absence of nausea and vomiting Progressing        NEUROLOGICAL - ADULT    • Achieves stable or improved neurolog

## 2017-05-22 ENCOUNTER — APPOINTMENT (OUTPATIENT)
Dept: RADIATION ONCOLOGY | Facility: HOSPITAL | Age: 82
End: 2017-05-22
Attending: RADIOLOGY
Payer: MEDICARE

## 2017-05-22 ENCOUNTER — APPOINTMENT (OUTPATIENT)
Dept: CT IMAGING | Facility: HOSPITAL | Age: 82
DRG: 394 | End: 2017-05-22
Attending: INTERNAL MEDICINE
Payer: MEDICARE

## 2017-05-22 ENCOUNTER — APPOINTMENT (OUTPATIENT)
Dept: HEMATOLOGY/ONCOLOGY | Facility: HOSPITAL | Age: 82
End: 2017-05-22
Attending: INTERNAL MEDICINE
Payer: MEDICARE

## 2017-05-22 PROCEDURE — 99232 SBSQ HOSP IP/OBS MODERATE 35: CPT | Performed by: INTERNAL MEDICINE

## 2017-05-22 PROCEDURE — 99233 SBSQ HOSP IP/OBS HIGH 50: CPT | Performed by: INTERNAL MEDICINE

## 2017-05-22 PROCEDURE — 74176 CT ABD & PELVIS W/O CONTRAST: CPT | Performed by: INTERNAL MEDICINE

## 2017-05-22 NOTE — BH PROGRESS NOTE
Went to talk with the pt and complete the ilya level of care assessment. The pt states she does not need any mental health services. She said, what she needs is someone to get nursing into her home because her  can't take care of her.   She denies a

## 2017-05-22 NOTE — PROGRESS NOTES
[facility]  Nephrology Progress Note    Naeem Velez Patient Status:  Inpatient    1934 MRN [id number]   [facility] 4NW-A Attending Clarisse Vora MD   Hosp Day # 6 PCP Geetha Wolf MD       SUBJECTIVE:  Feels a bit 3700 ml   Net  -1593 ml       General: Alert and oriented in no apparent distress.   HEENT: No scleral icterus, MMM  Neck: Supple, no KIERA or thyromegaly  Cardiac: Regular rate and rhythm, S1, S2 normal, no murmur or rub  Lungs: Clear without wheezes, rales,

## 2017-05-22 NOTE — OCCUPATIONAL THERAPY NOTE
OCCUPATIONAL THERAPY TREATMENT NOTE - INPATIENT     Room Number: 406/406-A  Session: 1   Number of Visits to Meet Established Goals: 3    Presenting Problem: A renal F    History related to current admission: Patient is a 80year old female with PMH sig fo • Renal disorder      ckd   • Crohn disease ([facility]    • High blood pressure    • Esophageal reflux    • IBS (irritable bowel syndrome)    • Anxiety state    • Cancer ([facility]      vulva 14 years ago, tx with surgery alone.    • Rectal cancer ([facility] 1/17     mu flat affect and minimal eye contact most of session. Patient reporting feeling very weak and unable to demonstrate moving of arms upon commands.   However, when patient wanting to engage in a volitional task such as covering herself up, SHERLEY CHEUNG w/ goo functional transfers:  with modified independent- progressing    UE Exercise Program Goal  Patient will be independent with bilateral AROM HEP (home exercise program). - progressing

## 2017-05-22 NOTE — PLAN OF CARE
GASTROINTESTINAL - ADULT    • Minimal or absence of nausea and vomiting  No nausea or vomiting this shift. Diet changed to regular d/t pts specific requests. Appetitie still poor.  Takes fluids w/o difficulty Progressing    • Maintains or returns to baselin

## 2017-05-22 NOTE — CONSULTS
INFECTIOUS DISEASE CONSULT NOTE    Dolores Calvillo Patient Status:  Inpatient    1934 MRN [id number]   [facility] 4NW-A Attending Lev Arroyo MD   Hosp Day # 6 PCP Na • Hearing impairment    • Peripheral vascular disease (HCC)    • Renal disorder      ckd   • Crohn disease (HCC)    • High blood pressure    • Esophageal reflux    • IBS (irritable bowel syndrome)    • Anxiety state    • Cancer ([address]      vulva 14 years facility-administered medications:   •  0.9%  NaCl infusion, , Intravenous, Continuous  •  ondansetron HCl (ZOFRAN) injection 4 mg, 4 mg, Intravenous, Q8H  •  Octreotide Acetate (sandoSTATIN) injection 100 mcg, 100 mcg, Subcutaneous, Q8H  •  Loperamide HCl Positive bowel sounds. Ostomy with copious watery stools  Musculoskeletal: Full range of motion of all extremities. No swelling noted.   Integument: No rash    Laboratory Data:    Recent Labs   Lab  05/20/17   0521  05/21/17   0553  05/22/17   0538   RBC Result No Growth 2 Days      Ova and Parasite w Giardia EIA Once [[id number]] Collected: 05/21/17 1156     Order Status: Sent Lab Status:  In process Updated: 05/21/17 6778     Specimen Information: Stool from Stool      Narrative:       The following orders from Stool      Urine Culture, Routine Once [[id number]] Collected: 05/19/17 1836     Order Status: Completed Lab Status: Final result Updated: 05/21/17 4405     Specimen Information: Urine from Urine, clean catch       Urine Culture 30,000 CFU/ML Gram posi Etiology. Initially felt to be due to chemo but off chemo for 1 week. So far all infectious w/u is neg. Has been having low grade fevers off and non but Bcx and stool studies so far neg.  Pt has Crohn's and is off treatment, I wonder if this could be playin

## 2017-05-22 NOTE — DIETARY NOTE
NUTRITION FOLLOW UP ASSESSMENT    Pt is at moderate nutrition risk. Pt meets malnutrition criteria.     NUTRITION DIAGNOSIS/PROBLEM:    Malnutrition related to inability to consume sufficient energy as evidenced by pt reports poor appetite/po, pt with 8 lbs presenting to the ED due to N/V. Last chemo was 3 days ago. Has been vomiting the last 2 days. Very little PO. Feels weak. No CP or SOB. Stool output in ostomy has been normal for her.     ANTHROPOMETRICS:  Ht: 165.1 cm (5' 5\")  Wt: 54.3 kg (119 lb 11.4 oz

## 2017-05-22 NOTE — PROGRESS NOTES
Heme/Onc Progress Note    Patient Name: Dolores Calvillo   YOB: 1934   Medical Record Number: [id number]   CSN: [id number]   Attending Physician: Sharrie Councilman, M.D. Subjective:  Still with loose stools. Feels marginally better.   So Subcutaneous, Q30 Days    Physical Examination:  General: Patient is alert and oriented x 3, not in acute distress.   Vital Signs: /50 mmHg  Pulse 102  Temp(Src) 99.6 °F (37.6 °C) (Oral)  Resp 20  Ht 1.651 m (5' 5\")  Wt 54.3 kg (119 lb 11.4 oz)  BMI Eosinophil % 1.3 %   Basophil % 0.3 %   Immature Granulocyte % 4.0 %   -SCAN SLIDE   Collection Time: 05/22/17  5:38 AM   Result Value Ref Range   Slide Review Slide reviewed, see previous RBC morphology. Radiology:  No new imaging.     Impression

## 2017-05-22 NOTE — PROGRESS NOTES
[facility] Hospitalist Progress Note                                                                   [facility] KOBE Anusha Harisabraham  7/28/1934    CC: FU diarrhea, N/V    Interval History:  Pt laying in bed.  H conjunctivae, No ptosis  Neck: No masses, trachae midline  Pulm: Lungs clear bilaterally, normal respiratory effort  CV: Heart with regular rate and rhythm, no murmur  GI: Abdomen soft, nontender, nondistended, ostomy in place  Ext: No cyanosis, clubbing, Counts are OK  - Has some rectal/vaginal irritation that is not new- continue ointments and monitor     Metabolic acidosis  - Chronic, restart bicarb tabs TID as she takes as outpatient, s/p bicarb gtt  - Monitor while on NS    Psych  -depressed mood, psyc

## 2017-05-22 NOTE — PLAN OF CARE
Maintains or returns to baseline bowel function Not Progressing      Minimal or absence of nausea and vomiting Progressing      Achieves stable or improved neurological status Progressing      Patient/Family Long Term Goal Progressing      Patient/Family S

## 2017-05-23 PROCEDURE — 99232 SBSQ HOSP IP/OBS MODERATE 35: CPT | Performed by: INTERNAL MEDICINE

## 2017-05-23 RX ORDER — MORPHINE 10 MG/ML
0.6 TINCTURE ORAL 3 TIMES DAILY PRN
Status: DISCONTINUED | OUTPATIENT
Start: 2017-05-23 | End: 2017-05-28

## 2017-05-23 NOTE — PROGRESS NOTES
[facility]  Nephrology Progress Note    Urvashi  Patient Status:  Inpatient    1934 MRN [id number]   [facility] 4NW-A Attending Margorie Essex, MD   Hosp Day # 7 PCP Kamaljit Rojas MD       SUBJECTIVE:  + loose stoo Alert and oriented in no apparent distress. HEENT: No scleral icterus, MMM  Neck: Supple, no KIERA or thyromegaly  Cardiac: Regular rate and rhythm, S1, S2 normal, no murmur or rub  Lungs: Clear without wheezes, rales, rhonchi. Abdomen: Soft, non-tender.

## 2017-05-23 NOTE — PHYSICAL THERAPY NOTE
PHYSICAL THERAPY TREATMENT NOTE - INPATIENT    Room Number: 406/406-A     Session: 1   Number of Visits to Meet Established Goals: 5    Presenting Problem: deconditioned, c/o N/V, rectal CA received chemo and radiation    Problem List  Principal Problem: History      Past Surgical History    APPENDECTOMY      D & C      COLONOSCOPY      Comment 6/09 no dysplasia.   Repeat 2014    COLONOSCOPY  5/13/2014    Comment Procedure: COLONOSCOPY;  Surgeon: Donya Lopez MD;  Location: [facility] ENDOSCOPY    COLECTOMY (ft): 15x2  Assistive Device: Rolling walker  Pattern: R Flexed knee;L Flexed knee;L Foot flat;R Foot flat (flexed posture, NBOS)  Stoop/Curb Assistance: Not tested  Comment : as per FIM policy scoring    Skilled Therapy Provided: RN approved session.   Pt RECOMMENDATIONS  PT Discharge Recommendations: Sub-acute rehabilitation (ELOS 12-15 days)     PLAN  PT Treatment Plan: Bed mobility; Endurance; Energy conservation;Patient education; Family education;Gait training;Strengthening;Stair training;Transfer trainin

## 2017-05-23 NOTE — PROGRESS NOTES
[facility] Hospitalist Progress Note                                                                   [facility] A Oral Sergio  7/28/1934    CC: FU diarrhea, N/V    Interval History:  Pt laying in bed.  Mady Woody clubbing, or edema  Skin: Skin warm and dry. No rashes or lesions.   MSK:  No digit cyanosis  Psych: AAO x 3, with appropriate affect    Pertinent Labs:   Recent Labs   Lab  05/21/17   0553  05/22/17   0538   RBC  3.92  3.67*   HGB  8.2*  7.7*   HCT  25.8* stated she did not need any mental health services  -currently refusing when I offered if she wants to talk    FEN:  - IVF  - Diet as tolerated  - Lytes prn    Proph:  - DVT proph with heparin    Dispo:  - [facility] hospitalist service  - Consults include: Onc,

## 2017-05-23 NOTE — PROGRESS NOTES
Heme/Onc Progress Note    Patient Name: Rik Mukherjee   YOB: 1934   Medical Record Number: [id number]   CSN: [id number]   Attending Physician: Hussein Sloan M.D.      Subjective:  Still having large amounts of stool output through ile Medications Ordered in Other Encounters:   •  Darbepoetin Orlando (ARANESP) 300 MCG/0.6ML injection 300 mcg, 300 mcg, Subcutaneous, Q30 Days    Physical Examination:  General: Patient is alert and oriented x 3, not in acute distress. Still appears weak.   Arlyn symphysis.  Dose reduction techniques were used.  Dose information is transmitted to the [facility] ([facility]) [address] ([address]) which includes the Dose Index Registry.      PATIENT STATED HISTORY: (As transcribed by T cancer  -within walls of pelvic abscess cavity.  Concurrent chemoRT on hold.  Will resume RT to completion when clinically improved.  No plans to resume capecitabine due to toxicity. May delay radiation until 5/30.     *Enteritis- intractable diarrhea  -lik

## 2017-05-23 NOTE — PROGRESS NOTES
[address] [facility]  TEL: [phone]  FAX: [phone]    Kathleen Juani Patient Status:  Inpatient    1934 MRN [id number]   [facility] 4NW-A Attending Yaz Bowens MD   Hosp Day # 7 PCP Eddye Allis JAMIL , CT DRAIN ABSCESS PERITONEAL (CPT=49406), 12/09/2016, 18:31. JAMIL , CT ABDOMEN+PELVIS(CPT=74176), 7/30/2015, 8:51.   INDICATIONS:  rectal ca, h/o perirectal abscess  TECHNIQUE:  Unenhanced multislice CT scanning was performed from the dome of  ischioanal rectal fluid collection that measures 5.9 x 4.8 cm is noted. Dictated by: Samantha Armando MD on 5/22/2017 at 17:15     Approved by: Samantha Armando MD                      ASSESSMENT:    1.  Low grade fevers   - so far Bcx and stool stud

## 2017-05-24 ENCOUNTER — ANESTHESIA EVENT (OUTPATIENT)
Dept: ENDOSCOPY | Facility: HOSPITAL | Age: 82
End: 2017-05-24

## 2017-05-24 PROCEDURE — 99232 SBSQ HOSP IP/OBS MODERATE 35: CPT | Performed by: INTERNAL MEDICINE

## 2017-05-24 RX ORDER — SODIUM PHOSPHATE,MONO-DIBASIC 19G-7G/118
1 ENEMA (ML) RECTAL ONCE
Status: COMPLETED | OUTPATIENT
Start: 2017-05-24 | End: 2017-05-24

## 2017-05-24 RX ORDER — SODIUM PHOSPHATE,MONO-DIBASIC 19G-7G/118
1 ENEMA (ML) RECTAL ONCE
Status: COMPLETED | OUTPATIENT
Start: 2017-05-25 | End: 2017-05-25

## 2017-05-24 NOTE — CM/SW NOTE
MSW met with the patient to discuss recommendations for AI and provided list.  The patient specified she has been to [facility] in the past.  MSW educated the patient on the difference between Acute and AI.   She will review the list and discuss with her f

## 2017-05-24 NOTE — PLAN OF CARE
GASTROINTESTINAL - ADULT    • Minimal or absence of nausea and vomiting Progressing    • Maintains or returns to baseline bowel function Progressing    • Maintains adequate nutritional intake (undernourished) Progressing        Impaired Activities of Daily

## 2017-05-24 NOTE — ANESTHESIA PREPROCEDURE EVALUATION
PRE-OP EVALUATION    Patient Name: Rhea Giordano    Pre-op Diagnosis: Rectal cancer ([facility] [address] [C20]  Rectal abscess [K61.1]  [facility] [C12.451]    Procedure(s):   FLEXIBLE SIGMOIDOSCOPY with possible stenting   Rectal ENDOSCOPIC Intravenous Once   [DISCONTINUED] epoetin rex (EPOGEN,PROCRIT) injection 10,000 Units 10,000 Units Intravenous Q7 Days   [COMPLETED] potassium chloride 40 mEq in sodium chloride 0.9 % 250 mL IVPB 40 mEq Intravenous Once   nystatin (MYCOSTATIN) suspension tablets (1,000 mg total) by mouth 2 (two) times daily. Take twice a day during radiation. Disp: 120 tablet Rfl: 5   sodium bicarbonate 650 MG Oral Tab Take 1 tablet (650 mg total) by mouth 3 (three) times daily.  Disp: 60 tablet Rfl: 11       Allergies: Asp Results  Component Value Date   WBC 7.4 05/24/2017   WBC 7.63 05/04/2017   RBC 3.40* 05/24/2017   RBC 4.30 05/04/2017   HGB 7.2* 05/24/2017   HGB 9.2* 05/04/2017   HCT 22.3* 05/24/2017   HCT 27.2* 05/04/2017   MCV 65.6* 05/24/2017   MCV 63.3* 05/04/2017

## 2017-05-24 NOTE — CONSULTS
[facility]                       Gastroenterology [address] [facility] Gastroenterology    Crystal Mora Patient Status:  Inpatient    1934 MRN [id number]   [facility] 4NW-A Attending Emmanuelle Garcia MD   [facility] Day # 8 generalized or localized, unspecified site    • Visual impairment    • Autoimmune disease ([facility] [address]    • Unspecified essential hypertension    • Blood disorder    • Psychiatric disorder    • Macular degeneration    • Perirectal abscess    • Stroke ([facility] [address]      t injection 10,000 Units 10,000 Units Intravenous QOD   [DISCONTINUED] 0.45% NaCl infusion  Intravenous Continuous   [COMPLETED] Potassium Chloride ER (K-DUR M20) CR tab 20 mEq 20 mEq Oral Once   diphenoxylate-atropine (LOMOTIL) 2.5-0.025 MG per tab 2 tablet (TYLENOL) tab 650 mg 650 mg Oral Q6H PRN   [DISCONTINUED] ondansetron HCl (ZOFRAN) injection 4 mg 4 mg Intravenous Q6H PRN   Darbepoetin Orlando (ARANESP) 300 MCG/0.6ML injection 300 mcg 300 mcg Subcutaneous Q30 Days     Allergies:   Aspirin Cr [Aspirin]    B kg/m2  SpO2 98%  Gen: AAO x 3, able to speak in complete sentences  HENT: EOMI, PERRL, oropharynx is clear with moist mucosal membranes  Eyes: Sclerae are anicteric  Neck:  Supple without nuchal rigidity  CV: Regular rate and rhythm, with normal S1 and S2 multislice CT scanning was performed from the dome of the diaphragm to the pubic symphysis.  Dose reduction techniques were used.  Dose information is transmitted to the [facility] ([employer] [facility]) Su[address] ([address]) which inc by: Melita Bowser MD          Impression: 41-year-old female with Crohn's disease of the small bowel/colon (diagnosed as a child) s/p permanent ileostomy and subtotal colectomy d/t recurrent fistulas (2014) who has developed several recurrent perianal

## 2017-05-24 NOTE — PROGRESS NOTES
Heme/Onc Progress Note    Patient Name: Dane Ramos   YOB: 1934   Medical Record Number: [id number]   CSN: [id number]   Attending Physician: Rikki Weber M.D. Subjective:  Patient has been a bit more withdrawn and depressed. Units, 5,000 Units, Subcutaneous, Q8H [address]  •  acetaminophen (TYLENOL) tab 650 mg, 650 mg, Oral, Q6H PRN    Facility-Administered Medications Ordered in Other Encounters:   •  Darbepoetin Orlando (ARANESP) 300 MCG/0.6ML injection 300 mcg, 300 mcg, Subcutaneous with surgery and IR to decide about IR drainage. Suspect this will be primarily mucinous tissue. Has a long history of perirectal abscesses in the past due to Crohn's.     *TANIA on CKD  -creat improved with hydration.  Renal following  -Replace lytes prn

## 2017-05-24 NOTE — OCCUPATIONAL THERAPY NOTE
OCCUPATIONAL THERAPY TREATMENT NOTE - INPATIENT     Room Number: 406/406-A  Session: 1/3  Number of Visits to Meet Established Goals: 3    Presenting Problem: A renal F    History related to current admission:   Patient is a 80year old female with PMH sig disease ([facility]    • High blood pressure    • Esophageal reflux    • IBS (irritable bowel syndrome)    • Anxiety state    • [facility]      vulva 14 years ago, tx with surgery alone.    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 Decreased active movement RUE>LUE. Pt somewhat drowsy but perked up with mobility.  Mod A (minx2) sit to stand, ambulated a few feet and seemed more alert, then ambulated approx 15 feet c chair follow, needed moderate cues for a seated rest.

## 2017-05-24 NOTE — PHYSICAL THERAPY NOTE
PHYSICAL THERAPY TREATMENT NOTE - INPATIENT    Room Number: 406/406-A     Session: 2   Number of Visits to Meet Established Goals: 5    Presenting Problem: deconditioned, c/o N/V, rectal CA received chemo and radiation    Problem List  Principal Problem: History      Past Surgical History    APPENDECTOMY      D & C      COLONOSCOPY      Comment 6/09 no dysplasia.   Repeat 2014    COLONOSCOPY  5/13/2014    Comment Procedure: COLONOSCOPY;  Surgeon: Tanisha Medrano MD;  Location: [address] [place] [address]    COLECTOMY Score:  Raw Score: 17   PT Approx Degree of Impairment Score: 50.57%   Standardized Score (AM-PAC Scale): 42.13   CMS Modifier (G-Code): CK    FUNCTIONAL ABILITY STATUS  Gait Assessment   Gait Assistance: Dependent assistance (per FIM, actual, min A c yannick rehabilitation patient should achieve mod I level in bed mob, t/f and gait. DISCHARGE RECOMMENDATIONS  PT Discharge Recommendations: Sub-acute rehabilitation (ELOS 12-15 days)     PLAN  PT Treatment Plan: Bed mobility; Endurance; Energy conservation; Vashti Zullyt

## 2017-05-24 NOTE — PROGRESS NOTES
[address] [facility]  TEL: [phone]  FAX: [phone]    [facility] Abo Patient Status:  Inpatient    1934 MRN [id number]   [facility] 4NW-A Attending Carli Christie MD   Hosp Day # 8 PCP Jaime Lott per CT, d/w IR, not a new collection when compared with old MRI, so decision was not to attempt drainage, however discussed again this am at GI oncology meeting, Dr Idania Velazco will try to aspirate to r/o infection as area has previously been infected   - ?  Kezia Scott

## 2017-05-24 NOTE — PROGRESS NOTES
[facility] Hospitalist Progress Note                                                                   [facility] KOBE Anusha Harisabraham  7/28/1934    CC: FU diarrhea, N/V    Interval History:  Pt laying in bed.  H Recent Labs   Lab  05/22/17   0538  05/23/17   0529  05/24/17   0600   GLU  132*  112*  103*   BUN  27*  23*  23*   CREATSERUM  2.15*  2.11*  2.09*   CA  8.1*  8.4  8.0*   NA  136  139  141   K  3.9  3.9  4.0   CL  101  105  109   CO2  27.0  24.0  23.0 Hospitalist  [phone]  5/24/2017  5:02 PM

## 2017-05-24 NOTE — PLAN OF CARE
Pt AOx4, fatigued. Ileostomy bag connected to naqvi drainage bag d/t large amt water output earlier in day. Bag became occluded with pieces of stool, leaking out of seal. Bag replaced with larger opening to facilitate drainage. Stool watery, brown.  At 00:1

## 2017-05-24 NOTE — PROGRESS NOTES
[facility]  Nephrology Progress Note    Delontebecki Mukherjee Patient Status:  Inpatient    1934 MRN [id number]   [facility] 4NW-A Attending Emerson Ochoa MD   Hosp Day # 8 PCP Pranav Molina MD       SUBJECTIVE:  Diarrhea vol 2029 ml       General: Alert and oriented in no apparent distress. HEENT: No scleral icterus, MMM  Neck: Supple, no KIERA or thyromegaly  Cardiac: Regular rate and rhythm, S1, S2 normal, no murmur or rub  Lungs: Clear without wheezes, rales, rhonchi.     Abd

## 2017-05-25 ENCOUNTER — ANESTHESIA (OUTPATIENT)
Dept: ENDOSCOPY | Facility: HOSPITAL | Age: 82
End: 2017-05-25

## 2017-05-25 ENCOUNTER — SURGERY (OUTPATIENT)
Age: 82
End: 2017-05-25

## 2017-05-25 ENCOUNTER — APPOINTMENT (OUTPATIENT)
Dept: GENERAL RADIOLOGY | Facility: HOSPITAL | Age: 82
DRG: 394 | End: 2017-05-25
Attending: INTERNAL MEDICINE
Payer: MEDICARE

## 2017-05-25 PROCEDURE — 99232 SBSQ HOSP IP/OBS MODERATE 35: CPT | Performed by: INTERNAL MEDICINE

## 2017-05-25 PROCEDURE — BD14ZZZ FLUOROSCOPY OF COLON: ICD-10-PCS | Performed by: INTERNAL MEDICINE

## 2017-05-25 PROCEDURE — 0DJD8ZZ INSPECTION OF LOWER INTESTINAL TRACT, VIA NATURAL OR ARTIFICIAL OPENING ENDOSCOPIC: ICD-10-PCS | Performed by: INTERNAL MEDICINE

## 2017-05-25 PROCEDURE — 76000 FLUOROSCOPY <1 HR PHYS/QHP: CPT | Performed by: INTERNAL MEDICINE

## 2017-05-25 PROCEDURE — 30243N1 TRANSFUSION OF NONAUTOLOGOUS RED BLOOD CELLS INTO CENTRAL VEIN, PERCUTANEOUS APPROACH: ICD-10-PCS | Performed by: HOSPITALIST

## 2017-05-25 RX ORDER — SODIUM CHLORIDE, SODIUM LACTATE, POTASSIUM CHLORIDE, CALCIUM CHLORIDE 600; 310; 30; 20 MG/100ML; MG/100ML; MG/100ML; MG/100ML
INJECTION, SOLUTION INTRAVENOUS CONTINUOUS
Status: DISCONTINUED | OUTPATIENT
Start: 2017-05-25 | End: 2017-05-26

## 2017-05-25 RX ORDER — ONDANSETRON 2 MG/ML
4 INJECTION INTRAMUSCULAR; INTRAVENOUS AS NEEDED
Status: DISCONTINUED | OUTPATIENT
Start: 2017-05-25 | End: 2017-05-25 | Stop reason: HOSPADM

## 2017-05-25 RX ORDER — NALOXONE HYDROCHLORIDE 0.4 MG/ML
80 INJECTION, SOLUTION INTRAMUSCULAR; INTRAVENOUS; SUBCUTANEOUS AS NEEDED
Status: DISCONTINUED | OUTPATIENT
Start: 2017-05-25 | End: 2017-05-25 | Stop reason: HOSPADM

## 2017-05-25 RX ORDER — HYDROMORPHONE HYDROCHLORIDE 1 MG/ML
0.2 INJECTION, SOLUTION INTRAMUSCULAR; INTRAVENOUS; SUBCUTANEOUS ONCE
Status: COMPLETED | OUTPATIENT
Start: 2017-05-25 | End: 2017-05-25

## 2017-05-25 RX ORDER — SODIUM CHLORIDE 9 MG/ML
INJECTION, SOLUTION INTRAVENOUS ONCE
Status: DISCONTINUED | OUTPATIENT
Start: 2017-05-25 | End: 2017-05-28

## 2017-05-25 NOTE — ANESTHESIA POSTPROCEDURE EVALUATION
[address] Patient Status:  Inpatient   Age/Gender 80year old female MRN [id number]   [facility] ENDOSCOPY Attending Violet Philip Day # 9 PCP Janiya Alford MD       Anesthesia Post-op Note    Pr

## 2017-05-25 NOTE — DIETARY NOTE
NUTRITION FOLLOW UP ASSESSMENT    Pt is at moderate nutrition risk. Pt meets malnutrition criteria.     NUTRITION DIAGNOSIS/PROBLEM:    Malnutrition related to inability to consume sufficient energy as evidenced by pt with poor appetite/po, pt with 8 lbs wt past, does not like chocolate or vanilla, agreeable to strawberry enlive, discussed supplements and nutrient dense foods.      Per H&P: 80year old female with PMH sig for thallasemia, sjogrens, MONSERRAT, chrons, CKD, anxiety, HTN, GERD, IBS and rectal CA on Xel

## 2017-05-25 NOTE — PROGRESS NOTES
[facility] Hospitalist Progress Note                                                                   [address] Marianne  7/28/1934    CC: FU diarrhea, N/V    Interval History:  SP sigmoidoscopy  F CREATSERUM  2.11*  2.09*  1.82*   CA  8.4  8.0*  8.7   NA  139  141  137   K  3.9  4.0  3.9   CL  105  109  109   CO2  24.0  23.0  21.0*           ROS: no change to ROS from my documentation yesterday, except as otherwise noted in the Interval History ab

## 2017-05-25 NOTE — PLAN OF CARE
Pt in a foul mood this am. Pt refused all am meds. Pt refused type and screen needed for blood Dr. Martina Alicia ordered for Hgb 6.7. Pt stated she will not do anything until CHARLEY Redding from Dr. Greer Macias office gets here.  Pt informed that Johana sched

## 2017-05-25 NOTE — PROGRESS NOTES
Heme/Onc Progress Note - Magno      Chief Complaint:    Follow up for rectal cancer    Interim History:      She had a sigmoidoscopy today. There was a necrotic cavity. She is feeling better. She no longer has diarrhea. Her appetite is improving.  She ha not planning to do drainage sinc collection looks old. ID following. *TANIA on CKD  -creat improved with hydration.  Renal following  -Replace lytes prn    *nausea, poor PO appetite.    -This is improving. She has an appetite at this visit.     Debility -

## 2017-05-25 NOTE — CM/SW NOTE
SW met w/pt to discuss AI recommendation. Pt stated, \"I don't think I'm going to go. \" SW reminded pt she had previously asked for more help. Encouraged pt to think about it. SW to follow up w/pt later today to discuss AI.

## 2017-05-25 NOTE — PROGRESS NOTES
[address] [facility]  TEL: [phone]  FAX: [phone]    Ana Cristina Paiz Patient Status:  Inpatient    1934 MRN [id number]   [facility] 4NW-A Attending Deondre Feng MD   Hosp Day # 9 PCP Liza Moe decision was not to attempt drainage, however discussed yesterday at GI oncology meeting, Dr Amador Mukherjee will try to aspirate to r/o infection as area has previously been infected   - ?  Wether fevers due to atelectasis (seems to be mainly in bed) or tumor its

## 2017-05-25 NOTE — PROGRESS NOTES
[facility]  Nephrology Progress Note    Gonzalez Rhoades Patient Status:  Inpatient    1934 MRN [id number]   [facility] 4NW-A Attending Naila Stephens MD   Hosp Day # 9 PCP Carline Barnes MD       SUBJECTIVE:  No acute iss (37.2 °C), Min:98.5 °F (36.9 °C), Max:99.3 °F (37.4 °C)       Intake/Output Summary (Last 24 hours) at 05/25/17 0934  Last data filed at 05/25/17 0849   Gross per 24 hour   Intake    840 ml   Output   2500 ml   Net  -1660 ml       General: Alert and orient

## 2017-05-25 NOTE — PLAN OF CARE
Hgb 6.7, 1 unit PRBC ordered by Dr. Pavel Marcus. Unable to draw from midline, patient now agreeable to having phlebotomy draw the labs. Page out to lab. Type and screen obtained and sent down at 1014.  Results back, patient updated- education provided, consent

## 2017-05-25 NOTE — PLAN OF CARE
GASTROINTESTINAL - ADULT    • Minimal or absence of nausea and vomiting Progressing    • Maintains or returns to baseline bowel function Progressing    • Maintains adequate nutritional intake (undernourished) Progressing      Poor appetite.  Did eat a bite

## 2017-05-25 NOTE — OPERATIVE REPORT
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

## 2017-05-26 PROCEDURE — 99232 SBSQ HOSP IP/OBS MODERATE 35: CPT | Performed by: INTERNAL MEDICINE

## 2017-05-26 RX ORDER — SODIUM CHLORIDE 9 MG/ML
INJECTION, SOLUTION INTRAVENOUS ONCE
Status: COMPLETED | OUTPATIENT
Start: 2017-05-26 | End: 2017-05-26

## 2017-05-26 NOTE — PLAN OF CARE
GASTROINTESTINAL - ADULT    • Minimal or absence of nausea and vomiting Progressing    • Maintains or returns to baseline bowel function Progressing    • Maintains adequate nutritional intake (undernourished) Progressing      Ileostomy draining small amt w

## 2017-05-26 NOTE — PROGRESS NOTES
[address] [facility]  TEL: [phone]  FAX: [phone]    Gonzalez Rhoades Patient Status:  Inpatient    1934 MRN [id number]   [facility] 4NW-A Attending Naila Stephens MD   Hosp Day # 10 PCP David Ordoñez vagina so no stenting was required. Fevers now seem to be resolving    2. Intractable diarrhea                - better, assume was due to chemo    3. H/o Crohn's disease    4.  Rectal cancer- was on capecitabine and XRT, now chemo on hold due to enteritis

## 2017-05-26 NOTE — PROGRESS NOTES
[facility] Hospitalist Progress Note                                                                   [facility] KOBE Vargas  7/28/1934    CC: FU diarrhea, N/V    Interval History:  Resting late aftern ROS: no change to ROS from my documentation yesterday, except as otherwise noted in the Interval History above. Assessment/Plan:  79 yo F with rectal CA on Xeloda here with N/V and weakness x 3 days.     N/V/diarrhea  - Suspect 2/2 chemo but no

## 2017-05-26 NOTE — OCCUPATIONAL THERAPY NOTE
Attempted to see pt for skilled OT services this date. Pt with low hemoglobin this a.m Will HOLD per dept policy. Will re-attempt as schedule allows.  Dev Marshall, 05/26/2017, 7:53 AM

## 2017-05-26 NOTE — PHYSICAL THERAPY NOTE
IP PT on hold per best practice protocol, Hg 6.7 Hct 21.1. D/w RN in agreement. Will re-attempt later this date as schedule permits.

## 2017-05-26 NOTE — CM/SW NOTE
SW met w/pt regarding AI. Pt stated, \"You already talked to me about it 5 times. \" SW explained discharge planning and asked the pt if she was interested in any of the facilities.  Pt stated, \"I'll be here for another week, Im not deciding until Im ready

## 2017-05-26 NOTE — PROGRESS NOTES
[facility]  Nephrology Progress Note    Ana Cristina Paiz Patient Status:  Inpatient    1934 MRN [id number]   [facility] 4NW-A Attending Deondre Feng MD   Hosp Day # 10 PCP Zayra Dorsey MD       SUBJECTIVE:  No acute is Intake/Output Summary (Last 24 hours) at 05/26/17 0907  Last data filed at 05/26/17 0771   Gross per 24 hour   Intake   3038 ml   Output    950 ml   Net   2088 ml       General: Alert and oriented in no apparent distress.   HEENT: No scleral icterus, MM

## 2017-05-26 NOTE — PROGRESS NOTES
Heme/Onc Progress Note - [facility]      Chief Complaint:    Follow up for rectal cancer    Interim History:      She has no new complaints this morning. She has some lower abdominal pain. She is on Meropenem 500 mg q12hr.  Overall she has been feeling better CKD  -creat continues to decrease with hydration.  Renal following  -Replace lytes prn    *nausea, poor PO appetite.    -Improving. Continue to push PO diet. Debility - increased activity.  Continue today.     Emily Kraft MD  [facility]

## 2017-05-27 PROCEDURE — 99232 SBSQ HOSP IP/OBS MODERATE 35: CPT | Performed by: INTERNAL MEDICINE

## 2017-05-27 RX ORDER — SODIUM BICARBONATE 325 MG/1
650 TABLET ORAL 2 TIMES DAILY
Status: DISCONTINUED | OUTPATIENT
Start: 2017-05-27 | End: 2017-05-28

## 2017-05-27 NOTE — PROGRESS NOTES
[facility] Hospitalist Progress Note                                                                   [facility]  7/28/1934    CC: FU diarrhea, N/V    Interval History:  Doing well- hoping CA  8.7  8.5  8.7   --    NA  137  140  135*   --    K  3.9  4.1  5.5*  3.9   CL  109  110  109   --    CO2  21.0*  20.0*  22.0   --            ROS: no change to ROS from my documentation yesterday, except as otherwise noted in the Interval History above

## 2017-05-27 NOTE — PLAN OF CARE
Pt refusing sandostatin this am as well as heparin. Will endorse to am nurse to follow up with md. Attempts to redirect unsuccessful.

## 2017-05-27 NOTE — PHYSICAL THERAPY NOTE
PHYSICAL THERAPY TREATMENT NOTE - INPATIENT    Room Number: 406/406-A     Session: 3   Number of Visits to Meet Established Goals: 5    Presenting Problem: deconditioned, c/o N/V, rectal CA received chemo and radiation    Problem List  Principal Problem: History      Past Surgical History    APPENDECTOMY      D & C      COLONOSCOPY      Comment 6/09 no dysplasia.   Repeat 2014    COLONOSCOPY  5/13/2014    Comment Procedure: COLONOSCOPY;  Surgeon: Teressa Lantigua MD;  Location: [address] [place] [address]    COLECTOMY 43.63   CMS Modifier (G-Code): CK    FUNCTIONAL ABILITY STATUS  Gait Assessment   Gait Assistance: Minimum assistance  Distance (ft): 20,60  Assistive Device: Rolling walker  Pattern: R Decreased stance time;L Decreased stance time (slow adan)  Stoop/Curb demonstrate supine - sit EOB @ level: modified independent         Goal #2    Patient is able to demonstrate transfers Sit to/from Stand at assistance level: modified independent         Goal #3    Patient is able to ambulate 150 feet with assist device: w

## 2017-05-27 NOTE — PROGRESS NOTES
[facility]  Nephrology Progress Note    Ayla Bal Attending:   Miguel A Orlando MD       Assessment and Plan:    1) TANIA- due to dehydration with poor PO intake / vomiting / high ostomy output- resolved with IVF, now 'ed    2) Acute on c 05/27/2017    05/27/2017   K 5.5 05/27/2017    05/27/2017   CO2 22.0 05/27/2017    05/27/2017   CA 8.7 05/27/2017   MG 1.6 05/27/2017       Imaging: All imaging studies reviewed.     Meds:     Current Facility-Administered Medications:

## 2017-05-27 NOTE — PLAN OF CARE
Pt alert and oriented x4. VSS and afebrile. Pt refusing heparin, imodium, and nystatin. Tele, NSR. Pt with poor intake. Call light within reach. Will continue to monitor.

## 2017-05-27 NOTE — PROGRESS NOTES
Heme/Onc Progress Note - [facility]      Chief Complaint:    Follow up for rectal cancer    Interim History:      She has no new complaints this morning. She has some lower abdominal pain. She is on Meropenem 500 mg q12hr. She still is weak.  She no longer ha Debility - increased activity.       8) Disposition: From heme/onc stand point can consider discharge soon. Can follow with radiation oncology on Tuesday. Patient requested to stay another day because she is weak and her  is out of town today.  She th

## 2017-05-27 NOTE — PROGRESS NOTES
GI Prog Note  No post-procedural symptoms or issues. Didn't require any intervention (stent or drainage). She needs antibiotics as per ID. Will sign off at this time.     PM

## 2017-05-28 VITALS
OXYGEN SATURATION: 95 % | WEIGHT: 129.19 LBS | HEIGHT: 65 IN | RESPIRATION RATE: 20 BRPM | TEMPERATURE: 99 F | SYSTOLIC BLOOD PRESSURE: 113 MMHG | DIASTOLIC BLOOD PRESSURE: 58 MMHG | HEART RATE: 74 BPM | BODY MASS INDEX: 21.52 KG/M2

## 2017-05-28 PROCEDURE — 99232 SBSQ HOSP IP/OBS MODERATE 35: CPT | Performed by: INTERNAL MEDICINE

## 2017-05-28 RX ORDER — CEPHALEXIN 500 MG/1
500 CAPSULE ORAL 2 TIMES DAILY
Qty: 20 CAPSULE | Refills: 0 | Status: SHIPPED | OUTPATIENT
Start: 2017-05-28 | End: 2017-06-07

## 2017-05-28 RX ORDER — SODIUM BICARBONATE 650 MG/1
650 TABLET ORAL 2 TIMES DAILY
Qty: 60 TABLET | Refills: 1 | Status: SHIPPED | OUTPATIENT
Start: 2017-05-28 | End: 2017-10-18

## 2017-05-28 NOTE — PROGRESS NOTES
Heme/Onc Progress Note - [facility]      Chief Complaint:    Follow up for rectal cancer    Interim History:      She has no new complaints this morning. She wants to go home. She was told that she would benefit by going to [facility].  She does not want to go to re prn    6) nausea, poor PO appetite.    -Improving. Continue to push PO diet. 7) Debility - increased activity.       8) Disposition: Patient is declining transfer to [facility]. She would like to be discharged home. She has follow up with Dr. Destiny Samano.  OK for d/c

## 2017-05-28 NOTE — PROGRESS NOTES
[facility]  Nephrology Progress Note    Ciera Barnes Attending: Jessenia Thomas MD       Assessment and Plan:    1) TANIA- due to dehydration with poor PO intake / vomiting / high ostomy output- resolved with IVF, now 'ed.  Replace Mg / ph 8.6 05/28/2017   HGB 9.7 05/28/2017   HCT 29.8 05/28/2017   .0 05/28/2017   CREATSERUM 1.72 05/28/2017   BUN 18 05/28/2017    05/28/2017   K 4.0 05/28/2017    05/28/2017   CO2 28.0 05/28/2017   GLU 99 05/28/2017   CA 9.0 05/28/2017   MG

## 2017-05-28 NOTE — PLAN OF CARE
GASTROINTESTINAL - ADULT    • Minimal or absence of nausea and vomiting Progressing     No nausea or vomiting noted this shift. Pt ate half of her dinner.      Impaired Activities of Daily Living    • Achieve highest/safest level of independence in self car

## 2017-05-28 NOTE — DISCHARGE SUMMARY
General Medicine Discharge Summary     Patient ID:  Chris Santana  80year old  7/28/1934    Admit date: 5/16/2017    Discharge date and time:  5/28/17    Attending Physician: No att. providers mooseu renal recs    Hyponatremia- rimproved    Rectal CA  - Hold Xeloda  - Onc on consult    Metabolic acidosis  - Chronic, restart bicarb tabs as she takes as outpatient, s/p bicarb gtt    Anemia  - Monitor transfuse for Hgb < 7    Psych  -depressed mood, psych 3  Heart: RRR  Lungs: clear bilaterally, no active wheezing  Abdomen: nontender, nondistended, intact BS  Extremities: no pedal edema   Neuro: CN inact, no focal deficits      Total time coordinating care for discharge: Uzma

## 2017-05-28 NOTE — PROGRESS NOTES
NURSING DISCHARGE NOTE    Discharged home   Accompanied by spouse and transport staff,  Belongings and prescription reviewed with patent and spouse.

## 2017-05-30 ENCOUNTER — APPOINTMENT (OUTPATIENT)
Dept: RADIATION ONCOLOGY | Facility: HOSPITAL | Age: 82
End: 2017-05-30
Attending: RADIOLOGY
Payer: MEDICARE

## 2017-05-30 ENCOUNTER — HOSPITAL ENCOUNTER (OUTPATIENT)
Dept: RADIATION ONCOLOGY | Facility: HOSPITAL | Age: 82
Discharge: HOME | End: 2017-05-30
Attending: RADIOLOGY
Payer: MEDICARE

## 2017-05-30 VITALS
HEART RATE: 80 BPM | BODY MASS INDEX: 21 KG/M2 | WEIGHT: 123.38 LBS | SYSTOLIC BLOOD PRESSURE: 112 MMHG | DIASTOLIC BLOOD PRESSURE: 53 MMHG

## 2017-05-30 DIAGNOSIS — C20 RECTAL CANCER (HCC): Primary | ICD-10-CM

## 2017-05-30 PROCEDURE — 77386 HC IMRT COMPLEX: CPT | Performed by: RADIOLOGY

## 2017-05-30 NOTE — PROGRESS NOTES
[facility] Radiation Treatment Management Note 16-20    Patient:  Ciera Barnes  Age:  80year old  Visit Diagnosis:    No diagnosis found.   Primary Rad/Onc:  Dr. Hero De La Fuente    Site Delivered Dose (Gy) Prescribed Dose (Gy) Fr d'cd   Continue XRT alone if BM controlled with Lomotil and imodium AD  Skin: Sitz bath, Cotton balls ok, start kenalog BID    GI: Ileostomy; as above, on keflex .     : No change    Continue radiotherapy per plan      Pt also provided ACS for ride assist

## 2017-05-31 ENCOUNTER — SNF/IP PROF CHARGE ONLY (OUTPATIENT)
Dept: HEMATOLOGY/ONCOLOGY | Facility: HOSPITAL | Age: 82
End: 2017-05-31

## 2017-05-31 DIAGNOSIS — C20 RECTAL CANCER (HCC): Primary | ICD-10-CM

## 2017-05-31 PROCEDURE — G9678 ONCOLOGY CARE MODEL SERVICE: HCPCS | Performed by: INTERNAL MEDICINE

## 2017-06-01 ENCOUNTER — TELEPHONE (OUTPATIENT)
Dept: RADIATION ONCOLOGY | Facility: HOSPITAL | Age: 82
End: 2017-06-01

## 2017-06-01 ENCOUNTER — TELEPHONE (OUTPATIENT)
Dept: HEMATOLOGY/ONCOLOGY | Facility: HOSPITAL | Age: 82
End: 2017-06-01

## 2017-06-01 ENCOUNTER — HOSPITAL ENCOUNTER (OUTPATIENT)
Dept: RADIATION ONCOLOGY | Facility: HOSPITAL | Age: 82
Discharge: HOME | End: 2017-06-01
Attending: RADIOLOGY
Payer: MEDICARE

## 2017-06-01 DIAGNOSIS — E86.0 DEHYDRATION: Primary | ICD-10-CM

## 2017-06-01 PROCEDURE — 77386 HC IMRT COMPLEX: CPT | Performed by: RADIOLOGY

## 2017-06-01 NOTE — TELEPHONE ENCOUNTER
Called to check in to see how she was feeling today. To assess her diarrhea. Asked to call back, or we can see how she is when she comes for treatment.

## 2017-06-01 NOTE — TELEPHONE ENCOUNTER
Spoke to patient who is having cramping in hands - unable to control fingers at times. Suggested that she may have low calcium and/or magnesium. She has at home - suggested she take one of each twice daily and push fluids.    Coming in tomorrow for RT and

## 2017-06-01 NOTE — TELEPHONE ENCOUNTER
The patient called with concerns about unusual movements with her fingers.  She described them as waves, they are painful, she can move the fingers, she describes the events as as she is holding a fork she suddenly cannot move her fingers and her hands elsie

## 2017-06-02 ENCOUNTER — OFFICE VISIT (OUTPATIENT)
Dept: HEMATOLOGY/ONCOLOGY | Facility: HOSPITAL | Age: 82
End: 2017-06-02
Attending: INTERNAL MEDICINE
Payer: MEDICARE

## 2017-06-02 VITALS
DIASTOLIC BLOOD PRESSURE: 55 MMHG | HEIGHT: 65 IN | OXYGEN SATURATION: 98 % | BODY MASS INDEX: 19.83 KG/M2 | WEIGHT: 119 LBS | TEMPERATURE: 98 F | SYSTOLIC BLOOD PRESSURE: 109 MMHG | RESPIRATION RATE: 18 BRPM | HEART RATE: 80 BPM

## 2017-06-02 DIAGNOSIS — E86.0 DEHYDRATION: Primary | ICD-10-CM

## 2017-06-02 DIAGNOSIS — C20 RECTAL CANCER (HCC): Primary | ICD-10-CM

## 2017-06-02 DIAGNOSIS — E86.0 DEHYDRATION: ICD-10-CM

## 2017-06-02 PROCEDURE — 99214 OFFICE O/P EST MOD 30 MIN: CPT | Performed by: NURSE PRACTITIONER

## 2017-06-02 PROCEDURE — 96361 HYDRATE IV INFUSION ADD-ON: CPT

## 2017-06-02 PROCEDURE — 77386 HC IMRT COMPLEX: CPT | Performed by: RADIOLOGY

## 2017-06-02 PROCEDURE — 96360 HYDRATION IV INFUSION INIT: CPT

## 2017-06-02 NOTE — PROGRESS NOTES
Per Dmitriy Chahal, APN- pt to receive 1 L 0.9 NS to be given today, Saturday and Sunday. Orders entered and appointments made. Pt aware and verbalized understanding. Will attempt to keep IV access all 3 days.      Education Record    Learner:  Patient    Disease /

## 2017-06-03 ENCOUNTER — OFFICE VISIT (OUTPATIENT)
Dept: HEMATOLOGY/ONCOLOGY | Facility: HOSPITAL | Age: 82
End: 2017-06-03
Attending: INTERNAL MEDICINE
Payer: MEDICARE

## 2017-06-03 VITALS
DIASTOLIC BLOOD PRESSURE: 83 MMHG | HEART RATE: 89 BPM | HEIGHT: 65 IN | OXYGEN SATURATION: 98 % | SYSTOLIC BLOOD PRESSURE: 138 MMHG | RESPIRATION RATE: 18 BRPM | TEMPERATURE: 97 F

## 2017-06-03 DIAGNOSIS — E86.0 DEHYDRATION: Primary | ICD-10-CM

## 2017-06-03 PROCEDURE — 96360 HYDRATION IV INFUSION INIT: CPT

## 2017-06-03 PROCEDURE — 96361 HYDRATE IV INFUSION ADD-ON: CPT

## 2017-06-04 ENCOUNTER — OFFICE VISIT (OUTPATIENT)
Dept: HEMATOLOGY/ONCOLOGY | Facility: HOSPITAL | Age: 82
End: 2017-06-04
Attending: INTERNAL MEDICINE
Payer: MEDICARE

## 2017-06-04 VITALS
DIASTOLIC BLOOD PRESSURE: 50 MMHG | HEART RATE: 81 BPM | TEMPERATURE: 97 F | RESPIRATION RATE: 18 BRPM | SYSTOLIC BLOOD PRESSURE: 115 MMHG

## 2017-06-04 DIAGNOSIS — E86.0 DEHYDRATION: Primary | ICD-10-CM

## 2017-06-04 PROCEDURE — 96360 HYDRATION IV INFUSION INIT: CPT

## 2017-06-04 PROCEDURE — 96361 HYDRATE IV INFUSION ADD-ON: CPT

## 2017-06-05 ENCOUNTER — APPOINTMENT (OUTPATIENT)
Dept: RADIATION ONCOLOGY | Facility: HOSPITAL | Age: 82
End: 2017-06-05
Attending: RADIOLOGY
Payer: MEDICARE

## 2017-06-05 ENCOUNTER — OFFICE VISIT (OUTPATIENT)
Dept: HEMATOLOGY/ONCOLOGY | Facility: HOSPITAL | Age: 82
End: 2017-06-05
Attending: INTERNAL MEDICINE
Payer: MEDICARE

## 2017-06-05 ENCOUNTER — TELEPHONE (OUTPATIENT)
Dept: HEMATOLOGY/ONCOLOGY | Facility: HOSPITAL | Age: 82
End: 2017-06-05

## 2017-06-05 ENCOUNTER — HOSPITAL ENCOUNTER (OUTPATIENT)
Dept: RADIATION ONCOLOGY | Facility: HOSPITAL | Age: 82
Discharge: HOME | End: 2017-06-05
Attending: RADIOLOGY
Payer: MEDICARE

## 2017-06-05 VITALS
RESPIRATION RATE: 20 BRPM | SYSTOLIC BLOOD PRESSURE: 108 MMHG | HEART RATE: 81 BPM | TEMPERATURE: 98 F | WEIGHT: 122 LBS | BODY MASS INDEX: 20 KG/M2 | DIASTOLIC BLOOD PRESSURE: 50 MMHG

## 2017-06-05 DIAGNOSIS — C21.1 ADENOCARCINOMA OF ANAL CANAL (HCC): Primary | ICD-10-CM

## 2017-06-05 DIAGNOSIS — N19 PRERENAL RENAL FAILURE: Primary | ICD-10-CM

## 2017-06-05 NOTE — PROGRESS NOTES
[facility] Radiation Treatment Management Note 16-20    Patient:  Rhea Giordano  Age:  80year old  Visit Diagnosis:    1.  Adenocarcinoma of anal canal ([address]      Primary Rad/Onc:  Dr. Crissie Hatchet    Site Delivered Dose (Gy) P

## 2017-06-05 NOTE — PROGRESS NOTES
NUTRITION F/U NOTE:     DX: anal cancer  TX: RT; xeloda d/c    PMH: noted pt recently d/c from hospital w/ dehydration/diarrhea; crohn's dz w/ ileostomy    WT HX:  06/05/17: 122 lbs  06/02/17: 119 lbs  05/30/17: 123 lbs  05/27/17: 129 lbs  05/16/17 : 119 l

## 2017-06-05 NOTE — TELEPHONE ENCOUNTER
Patient had been scheduled for APN follow up and repeat labs    She had hand cramping and abnormal labs    She went for radiation today, she is going to continue to take the magnesium twice daily, she took it only once yesterday and she did have some cramp

## 2017-06-06 ENCOUNTER — NURSE ONLY (OUTPATIENT)
Dept: HEMATOLOGY/ONCOLOGY | Facility: HOSPITAL | Age: 82
End: 2017-06-06
Attending: INTERNAL MEDICINE
Payer: MEDICARE

## 2017-06-06 ENCOUNTER — APPOINTMENT (OUTPATIENT)
Dept: RADIATION ONCOLOGY | Facility: HOSPITAL | Age: 82
End: 2017-06-06
Attending: RADIOLOGY
Payer: MEDICARE

## 2017-06-06 ENCOUNTER — TELEPHONE (OUTPATIENT)
Dept: HEMATOLOGY/ONCOLOGY | Facility: HOSPITAL | Age: 82
End: 2017-06-06

## 2017-06-06 DIAGNOSIS — C20 RECTAL CANCER (HCC): ICD-10-CM

## 2017-06-06 DIAGNOSIS — N18.30 STAGE 3 CHRONIC KIDNEY DISEASE (HCC): ICD-10-CM

## 2017-06-06 DIAGNOSIS — N19 PRERENAL RENAL FAILURE: ICD-10-CM

## 2017-06-06 PROCEDURE — 36415 COLL VENOUS BLD VENIPUNCTURE: CPT

## 2017-06-06 PROCEDURE — 80048 BASIC METABOLIC PNL TOTAL CA: CPT

## 2017-06-06 PROCEDURE — 83735 ASSAY OF MAGNESIUM: CPT

## 2017-06-06 PROCEDURE — 85025 COMPLETE CBC W/AUTO DIFF WBC: CPT

## 2017-06-06 PROCEDURE — 84100 ASSAY OF PHOSPHORUS: CPT

## 2017-06-06 PROCEDURE — 77386 HC IMRT COMPLEX: CPT | Performed by: RADIOLOGY

## 2017-06-06 NOTE — TELEPHONE ENCOUNTER
Patient needs to come for hydration tomorrow. Left message   Await call back. Also left message for radiation team so we can catch the patient tomorrow.

## 2017-06-06 NOTE — PROGRESS NOTES
TELEPHONE CALL:     06/05/17 @ 2413    Pt left message noting her Mg++ blend was \"magnesium oxide. \"     06/60/17 @ 0900    RD returned pt call noting Magnesium oxide is often difficult for the body to absorb.  RD suggested pt switch to magnesium gluconate

## 2017-06-07 ENCOUNTER — HOSPITAL ENCOUNTER (OUTPATIENT)
Dept: RADIATION ONCOLOGY | Facility: HOSPITAL | Age: 82
Discharge: HOME | End: 2017-06-07
Attending: RADIOLOGY
Payer: MEDICARE

## 2017-06-07 ENCOUNTER — OFFICE VISIT (OUTPATIENT)
Dept: HEMATOLOGY/ONCOLOGY | Facility: HOSPITAL | Age: 82
End: 2017-06-07
Attending: INTERNAL MEDICINE
Payer: MEDICARE

## 2017-06-07 VITALS
DIASTOLIC BLOOD PRESSURE: 56 MMHG | SYSTOLIC BLOOD PRESSURE: 110 MMHG | RESPIRATION RATE: 18 BRPM | HEART RATE: 69 BPM | TEMPERATURE: 97 F

## 2017-06-07 DIAGNOSIS — E86.0 DEHYDRATION: ICD-10-CM

## 2017-06-07 DIAGNOSIS — N18.4 CKD (CHRONIC KIDNEY DISEASE) STAGE 4, GFR 15-29 ML/MIN (HCC): Primary | ICD-10-CM

## 2017-06-07 DIAGNOSIS — C20 RECTAL CANCER (HCC): Primary | ICD-10-CM

## 2017-06-07 PROCEDURE — 83735 ASSAY OF MAGNESIUM: CPT

## 2017-06-07 PROCEDURE — 84100 ASSAY OF PHOSPHORUS: CPT

## 2017-06-07 PROCEDURE — 80053 COMPREHEN METABOLIC PANEL: CPT

## 2017-06-07 PROCEDURE — 77386 HC IMRT COMPLEX: CPT | Performed by: RADIOLOGY

## 2017-06-07 PROCEDURE — 96360 HYDRATION IV INFUSION INIT: CPT

## 2017-06-07 PROCEDURE — 96361 HYDRATE IV INFUSION ADD-ON: CPT

## 2017-06-07 PROCEDURE — 99214 OFFICE O/P EST MOD 30 MIN: CPT | Performed by: NURSE PRACTITIONER

## 2017-06-07 NOTE — PROGRESS NOTES
CC:Patient presents with: Follow - Up: abnormal labs      HPI:   Cris Olvera is a(n) 80year old female followed by Dr. Tanja Wolfe with anal canal cancer. She recently had initiated RT/oral xeloda.   She did not tolerate the oral xeloda, she required (from the past 24 hour(s))  -COMP METABOLIC PANEL (14)   Collection Time: 06/07/17 11:48 AM   Result Value Ref Range   Glucose 93 70-99 mg/dL   BUN 30 (H) 8-20 mg/dL   Creatinine 2.53 (H) 0.55-1.02 mg/dL   GFR 17 (L) >=60   Calcium, Total 9.9 8.3-10.3 mg/d
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
## 2017-06-08 ENCOUNTER — OFFICE VISIT (OUTPATIENT)
Dept: HEMATOLOGY/ONCOLOGY | Facility: HOSPITAL | Age: 82
End: 2017-06-08
Attending: INTERNAL MEDICINE
Payer: MEDICARE

## 2017-06-08 VITALS
WEIGHT: 120.38 LBS | DIASTOLIC BLOOD PRESSURE: 62 MMHG | SYSTOLIC BLOOD PRESSURE: 113 MMHG | TEMPERATURE: 98 F | BODY MASS INDEX: 20.06 KG/M2 | RESPIRATION RATE: 18 BRPM | HEIGHT: 65 IN | HEART RATE: 85 BPM

## 2017-06-08 DIAGNOSIS — E86.0 DEHYDRATION: Primary | ICD-10-CM

## 2017-06-08 PROCEDURE — 77386 HC IMRT COMPLEX: CPT | Performed by: RADIOLOGY

## 2017-06-08 PROCEDURE — 96360 HYDRATION IV INFUSION INIT: CPT

## 2017-06-08 PROCEDURE — 96361 HYDRATE IV INFUSION ADD-ON: CPT

## 2017-06-08 NOTE — PROGRESS NOTES
Per patient, less stool output today. Stool more solid. Increased urine output. Patient states she's feeling better today. Will come for IVF tomorrow as scheduled. Harden Laundry to see patient tomorrow.     Education Record    Learner:  Patient    Disease /

## 2017-06-09 ENCOUNTER — OFFICE VISIT (OUTPATIENT)
Dept: HEMATOLOGY/ONCOLOGY | Facility: HOSPITAL | Age: 82
End: 2017-06-09
Attending: INTERNAL MEDICINE
Payer: MEDICARE

## 2017-06-09 VITALS
OXYGEN SATURATION: 98 % | TEMPERATURE: 98 F | HEIGHT: 65 IN | HEART RATE: 85 BPM | DIASTOLIC BLOOD PRESSURE: 60 MMHG | RESPIRATION RATE: 18 BRPM | SYSTOLIC BLOOD PRESSURE: 121 MMHG | WEIGHT: 120 LBS | BODY MASS INDEX: 19.99 KG/M2

## 2017-06-09 DIAGNOSIS — C20 RECTAL CANCER (HCC): ICD-10-CM

## 2017-06-09 DIAGNOSIS — N18.30 STAGE 3 CHRONIC KIDNEY DISEASE (HCC): ICD-10-CM

## 2017-06-09 PROCEDURE — 77386 HC IMRT COMPLEX: CPT | Performed by: RADIOLOGY

## 2017-06-09 PROCEDURE — 77336 RADIATION PHYSICS CONSULT: CPT | Performed by: RADIOLOGY

## 2017-06-09 PROCEDURE — 99211 OFF/OP EST MAY X REQ PHY/QHP: CPT

## 2017-06-09 NOTE — PROGRESS NOTES
Pt refuses labs and IVF today. Does not want to be stuck again, IV that is is place is no longer usable. States she does not need labs twice a week, denies diarrhea, having formed stools, is eating and drinking.  Agata Conti notified

## 2017-06-12 ENCOUNTER — HOSPITAL ENCOUNTER (OUTPATIENT)
Dept: RADIATION ONCOLOGY | Facility: HOSPITAL | Age: 82
Discharge: HOME | End: 2017-06-12
Attending: RADIOLOGY
Payer: MEDICARE

## 2017-06-12 VITALS
HEART RATE: 97 BPM | BODY MASS INDEX: 19 KG/M2 | SYSTOLIC BLOOD PRESSURE: 102 MMHG | WEIGHT: 116.38 LBS | DIASTOLIC BLOOD PRESSURE: 62 MMHG | RESPIRATION RATE: 20 BRPM | TEMPERATURE: 99 F

## 2017-06-12 DIAGNOSIS — C21.1 ADENOCARCINOMA OF ANAL CANAL (HCC): Primary | ICD-10-CM

## 2017-06-12 PROCEDURE — 77386 HC IMRT COMPLEX: CPT | Performed by: RADIOLOGY

## 2017-06-12 NOTE — PROGRESS NOTES
[facility] Radiation Treatment Management Note 16-20    Patient:  Bossman Vasquez  Age:  80year old  Visit Diagnosis:    1.  Adenocarcinoma of anal canal ([address]      Primary Rad/Onc:  Dr. Vanessa Urrutia    Site Delivered Dose (Gy) P

## 2017-06-13 ENCOUNTER — OFFICE VISIT (OUTPATIENT)
Dept: HEMATOLOGY/ONCOLOGY | Facility: HOSPITAL | Age: 82
End: 2017-06-13
Attending: INTERNAL MEDICINE
Payer: MEDICARE

## 2017-06-13 ENCOUNTER — TELEPHONE (OUTPATIENT)
Dept: HEMATOLOGY/ONCOLOGY | Facility: HOSPITAL | Age: 82
End: 2017-06-13

## 2017-06-13 PROCEDURE — 77386 HC IMRT COMPLEX: CPT | Performed by: RADIOLOGY

## 2017-06-13 NOTE — PROGRESS NOTES
NUTRITION F/U NOTE:     DX: anal cancer  TX: RT; xeloda d/c    PMH: noted pt recently d/c from hospital (5/16-5/28/17) w/ dehydration/diarrhea; crohn's dz w/ ileostomy    LABS (6/7/17): Mg++ 1.7 mg/dl (L); BUN/CR 30/2.53 mg/dl; ALB 2.9 mg/dl    WT HX:  06/

## 2017-06-15 PROCEDURE — 77386 HC IMRT COMPLEX: CPT | Performed by: RADIOLOGY

## 2017-06-16 ENCOUNTER — NURSE ONLY (OUTPATIENT)
Dept: HEMATOLOGY/ONCOLOGY | Facility: HOSPITAL | Age: 82
End: 2017-06-16
Attending: INTERNAL MEDICINE
Payer: MEDICARE

## 2017-06-16 ENCOUNTER — TELEPHONE (OUTPATIENT)
Dept: HEMATOLOGY/ONCOLOGY | Facility: HOSPITAL | Age: 82
End: 2017-06-16

## 2017-06-16 VITALS
WEIGHT: 118.5 LBS | HEART RATE: 107 BPM | TEMPERATURE: 98 F | OXYGEN SATURATION: 97 % | DIASTOLIC BLOOD PRESSURE: 64 MMHG | SYSTOLIC BLOOD PRESSURE: 108 MMHG | BODY MASS INDEX: 20 KG/M2 | RESPIRATION RATE: 18 BRPM

## 2017-06-16 DIAGNOSIS — N18.30 STAGE 3 CHRONIC KIDNEY DISEASE (HCC): ICD-10-CM

## 2017-06-16 DIAGNOSIS — E87.1 HYPONATREMIA: ICD-10-CM

## 2017-06-16 DIAGNOSIS — E83.42 HYPOMAGNESEMIA: ICD-10-CM

## 2017-06-16 DIAGNOSIS — N18.9 CHRONIC KIDNEY DISEASE, UNSPECIFIED CKD STAGE: ICD-10-CM

## 2017-06-16 DIAGNOSIS — E86.0 DEHYDRATION: ICD-10-CM

## 2017-06-16 DIAGNOSIS — C20 RECTAL CANCER (HCC): Primary | ICD-10-CM

## 2017-06-16 DIAGNOSIS — E86.0 DEHYDRATION: Primary | ICD-10-CM

## 2017-06-16 PROCEDURE — 80048 BASIC METABOLIC PNL TOTAL CA: CPT

## 2017-06-16 PROCEDURE — 83735 ASSAY OF MAGNESIUM: CPT

## 2017-06-16 PROCEDURE — 84100 ASSAY OF PHOSPHORUS: CPT

## 2017-06-16 PROCEDURE — 85025 COMPLETE CBC W/AUTO DIFF WBC: CPT

## 2017-06-16 PROCEDURE — 96366 THER/PROPH/DIAG IV INF ADDON: CPT

## 2017-06-16 PROCEDURE — 96365 THER/PROPH/DIAG IV INF INIT: CPT

## 2017-06-16 PROCEDURE — 77336 RADIATION PHYSICS CONSULT: CPT | Performed by: RADIOLOGY

## 2017-06-16 PROCEDURE — 77386 HC IMRT COMPLEX: CPT | Performed by: RADIOLOGY

## 2017-06-16 NOTE — PROGRESS NOTES
Pt arrived at approx 1305 pm- spoke to [facility] at  stating that she is not feeling well and would like labs drawn today. Pt escorted to Tx area. V/S and RN assessment completed.  Pt states she has been experiencing severe hand cramping and RLS las

## 2017-06-16 NOTE — TELEPHONE ENCOUNTER
Reviewed labs with Dr. Dillon Stafford. Left message on patient's voicemail to return on Monday for labs at 1pm prior to her rad onc appt to check her BMP and Mg and possible hydration.   If pt feels worse through the course of the wknd, she was directed to go to t

## 2017-06-16 NOTE — PATIENT INSTRUCTIONS
POST-RADIATION INSTRUCTIONS:   - FOLLOW-UP WITH DR. WILCOX IN 1 MONTH; PLEASE STOP BY THE  RECEPTION AREA TO MAKE AN APPOINTMENT  - FOLLOW-UP WITH DR. DIAS AFTER RADIATION COMPLETION  - SIDE EFFECTS OF RADIATION WILL GRADUALLY SUBSIDE, IT MAY [place]

## 2017-06-19 ENCOUNTER — OFFICE VISIT (OUTPATIENT)
Dept: HEMATOLOGY/ONCOLOGY | Facility: HOSPITAL | Age: 82
End: 2017-06-19
Attending: INTERNAL MEDICINE
Payer: MEDICARE

## 2017-06-19 ENCOUNTER — HOSPITAL ENCOUNTER (OUTPATIENT)
Dept: RADIATION ONCOLOGY | Facility: HOSPITAL | Age: 82
Discharge: HOME | End: 2017-06-19
Attending: RADIOLOGY
Payer: MEDICARE

## 2017-06-19 ENCOUNTER — TELEPHONE (OUTPATIENT)
Dept: HEMATOLOGY/ONCOLOGY | Facility: HOSPITAL | Age: 82
End: 2017-06-19

## 2017-06-19 VITALS
SYSTOLIC BLOOD PRESSURE: 102 MMHG | WEIGHT: 117 LBS | TEMPERATURE: 98 F | DIASTOLIC BLOOD PRESSURE: 48 MMHG | OXYGEN SATURATION: 100 % | HEART RATE: 68 BPM | BODY MASS INDEX: 19.73 KG/M2 | HEIGHT: 64.61 IN | RESPIRATION RATE: 18 BRPM

## 2017-06-19 DIAGNOSIS — R11.0 NAUSEA: ICD-10-CM

## 2017-06-19 DIAGNOSIS — C20 RECTAL CANCER (HCC): ICD-10-CM

## 2017-06-19 DIAGNOSIS — N18.30 STAGE 3 CHRONIC KIDNEY DISEASE (HCC): ICD-10-CM

## 2017-06-19 DIAGNOSIS — C20 RECTAL CANCER (HCC): Primary | ICD-10-CM

## 2017-06-19 DIAGNOSIS — R11.0 NAUSEA: Primary | ICD-10-CM

## 2017-06-19 DIAGNOSIS — C21.1 ADENOCARCINOMA OF ANAL CANAL (HCC): Primary | ICD-10-CM

## 2017-06-19 PROCEDURE — 96360 HYDRATION IV INFUSION INIT: CPT

## 2017-06-19 PROCEDURE — 77386 HC IMRT COMPLEX: CPT | Performed by: RADIOLOGY

## 2017-06-19 PROCEDURE — 96361 HYDRATE IV INFUSION ADD-ON: CPT

## 2017-06-19 PROCEDURE — 99214 OFFICE O/P EST MOD 30 MIN: CPT | Performed by: NURSE PRACTITIONER

## 2017-06-19 RX ORDER — SODIUM CHLORIDE 9 MG/ML
INJECTION, SOLUTION INTRAVENOUS ONCE
Status: COMPLETED | OUTPATIENT
Start: 2017-06-19 | End: 2017-06-19

## 2017-06-19 RX ADMIN — SODIUM CHLORIDE: 9 INJECTION, SOLUTION INTRAVENOUS at 13:13:00

## 2017-06-19 NOTE — PROGRESS NOTES
CC:Patient presents with: Follow - Up: APN assessment. Rectal cancer currently receiving Radiation treatment. Reports increased appetite and able to take in more fluids over weekend.         HPI:   Ron Ricketts is a(n) 80year old female followed by No JVD. No palpable lymphadenopathy. Neck is supple. Chest: Clear to auscultation. Heart: Regular rate and rhythm. Abdomen: Soft, non tender with good bowel sounds. Extremities: Pedal pulses are present. No edema. Neurological: Grossly intact.    Lymp

## 2017-06-19 NOTE — TELEPHONE ENCOUNTER
Called the patient to assess    She is nauseated, no compazine or zofran at home    She states she had done well with oral intake over the weekend    She stopped imodium an lomotil and ostomy output has gone down  She is urinating okay    She will try xana

## 2017-06-19 NOTE — PROGRESS NOTES
[facility] Radiation Treatment Management Note 26-30    Patient:  Chirag Rico  Age:  80year old  Visit Diagnosis:    1.  Adenocarcinoma of anal canal ([address]      Primary Rad/Onc:  Dr. Orquidea Samuels    Site Delivered Dose (Gy) P

## 2017-06-19 NOTE — PROGRESS NOTES
Run 1L fluids to be done before RT. RT is at 1345. Patient is a hard stick, Edie Decker is helping start IV. Will get fluids in ASAP. Needs IV for tomorrow as well.

## 2017-06-20 ENCOUNTER — OFFICE VISIT (OUTPATIENT)
Dept: HEMATOLOGY/ONCOLOGY | Facility: HOSPITAL | Age: 82
End: 2017-06-20
Attending: INTERNAL MEDICINE
Payer: MEDICARE

## 2017-06-20 VITALS
RESPIRATION RATE: 18 BRPM | BODY MASS INDEX: 20.07 KG/M2 | WEIGHT: 119 LBS | HEART RATE: 83 BPM | HEIGHT: 64.61 IN | DIASTOLIC BLOOD PRESSURE: 62 MMHG | SYSTOLIC BLOOD PRESSURE: 117 MMHG | OXYGEN SATURATION: 100 % | TEMPERATURE: 99 F

## 2017-06-20 DIAGNOSIS — E86.0 DEHYDRATION: Primary | ICD-10-CM

## 2017-06-20 PROCEDURE — 77386 HC IMRT COMPLEX: CPT | Performed by: RADIOLOGY

## 2017-06-20 NOTE — PROGRESS NOTES
Pt arrived for IVF today and had left IV accessed from previous day infusion, IV was occluded and needed to be removed, pt refused to have new IV placed and stated not wanting fluids today, instructed pt to azael if any increase in vomiting or diarrhea, pt a

## 2017-06-21 ENCOUNTER — MEDICAL CORRESPONDENCE (OUTPATIENT)
Dept: RADIATION ONCOLOGY | Facility: HOSPITAL | Age: 82
End: 2017-06-21

## 2017-06-21 PROCEDURE — 77386 HC IMRT COMPLEX: CPT | Performed by: RADIOLOGY

## 2017-06-23 ENCOUNTER — NURSE ONLY (OUTPATIENT)
Dept: NEPHROLOGY | Facility: CLINIC | Age: 82
End: 2017-06-23

## 2017-06-23 ENCOUNTER — OFFICE VISIT (OUTPATIENT)
Dept: HEMATOLOGY/ONCOLOGY | Facility: HOSPITAL | Age: 82
End: 2017-06-23
Attending: INTERNAL MEDICINE
Payer: MEDICARE

## 2017-06-23 VITALS
SYSTOLIC BLOOD PRESSURE: 110 MMHG | DIASTOLIC BLOOD PRESSURE: 63 MMHG | HEART RATE: 71 BPM | BODY MASS INDEX: 20 KG/M2 | TEMPERATURE: 98 F | RESPIRATION RATE: 16 BRPM | WEIGHT: 119 LBS | OXYGEN SATURATION: 98 %

## 2017-06-23 VITALS — SYSTOLIC BLOOD PRESSURE: 112 MMHG | DIASTOLIC BLOOD PRESSURE: 56 MMHG

## 2017-06-23 DIAGNOSIS — N18.9 ANEMIA IN CHRONIC KIDNEY DISEASE: Primary | ICD-10-CM

## 2017-06-23 DIAGNOSIS — N18.4 CHRONIC KIDNEY DISEASE, STAGE IV (SEVERE) (HCC): ICD-10-CM

## 2017-06-23 DIAGNOSIS — C20 RECTAL CANCER (HCC): ICD-10-CM

## 2017-06-23 DIAGNOSIS — D63.1 ANEMIA IN CHRONIC KIDNEY DISEASE: Primary | ICD-10-CM

## 2017-06-23 DIAGNOSIS — E86.0 DEHYDRATION: ICD-10-CM

## 2017-06-23 DIAGNOSIS — N18.4 CKD (CHRONIC KIDNEY DISEASE) STAGE 4, GFR 15-29 ML/MIN (HCC): ICD-10-CM

## 2017-06-23 DIAGNOSIS — E87.2 METABOLIC ACIDOSIS: Primary | ICD-10-CM

## 2017-06-23 DIAGNOSIS — N18.30 STAGE 3 CHRONIC KIDNEY DISEASE (HCC): ICD-10-CM

## 2017-06-23 PROCEDURE — 83735 ASSAY OF MAGNESIUM: CPT

## 2017-06-23 PROCEDURE — 99214 OFFICE O/P EST MOD 30 MIN: CPT | Performed by: INTERNAL MEDICINE

## 2017-06-23 PROCEDURE — 84100 ASSAY OF PHOSPHORUS: CPT

## 2017-06-23 PROCEDURE — 85025 COMPLETE CBC W/AUTO DIFF WBC: CPT

## 2017-06-23 PROCEDURE — 80053 COMPREHEN METABOLIC PANEL: CPT

## 2017-06-23 PROCEDURE — 96372 THER/PROPH/DIAG INJ SC/IM: CPT | Performed by: INTERNAL MEDICINE

## 2017-06-23 PROCEDURE — 36415 COLL VENOUS BLD VENIPUNCTURE: CPT

## 2017-06-23 NOTE — PROGRESS NOTES
Patient is here for MD f/u for rectal cancer. Pt finished radiation yesterday. Feels well today. Appetite and energy level is good. No GI complaints. Less ostomy output this week.          Education Record    Learner:  Patient    Disease / Diagnosis:  Recta

## 2017-06-26 NOTE — PROGRESS NOTES
[facility]    PATIENT'S NAME: Isabel HerrScott [place] KOBE   ATTENDING PHYSICIAN: Leatha Galicia M.D.    PATIENT ACCOUNT #: [de-identified] LOCATION: [address] [place] [address] RECORD #: [id number] YOB: 1934   DATE OF SERVICE: 06/23/2017       CANCER has pink conjunctivae, anicteric sclerae. Pharynx is without lesions. LYMPHATICS:  She has no cervical, supraclavicular or axillary adenopathy. LUNGS:  Resonant to percussion and clear to auscultation. No wheezing, rales or rhonchi.   HEART:  Regular S1 15:50:22  [facility] [id number]  /    cc: Lanney Leyden, M.D. Daniel Haro M.D. Cornel Haas.  Ioana Luis MD

## 2017-06-28 ENCOUNTER — NURSE ONLY (OUTPATIENT)
Dept: HEMATOLOGY/ONCOLOGY | Facility: HOSPITAL | Age: 82
End: 2017-06-28
Attending: INTERNAL MEDICINE
Payer: MEDICARE

## 2017-06-28 DIAGNOSIS — N18.30 STAGE 3 CHRONIC KIDNEY DISEASE (HCC): ICD-10-CM

## 2017-06-28 DIAGNOSIS — C20 RECTAL CANCER (HCC): ICD-10-CM

## 2017-06-28 PROCEDURE — 85025 COMPLETE CBC W/AUTO DIFF WBC: CPT

## 2017-06-28 PROCEDURE — 36415 COLL VENOUS BLD VENIPUNCTURE: CPT

## 2017-06-28 PROCEDURE — 80053 COMPREHEN METABOLIC PANEL: CPT

## 2017-06-30 ENCOUNTER — SNF/IP PROF CHARGE ONLY (OUTPATIENT)
Dept: HEMATOLOGY/ONCOLOGY | Facility: HOSPITAL | Age: 82
End: 2017-06-30

## 2017-06-30 DIAGNOSIS — C20 RECTAL CANCER (HCC): ICD-10-CM

## 2017-06-30 PROCEDURE — G9678 ONCOLOGY CARE MODEL SERVICE: HCPCS | Performed by: INTERNAL MEDICINE

## 2017-07-03 NOTE — PROGRESS NOTES
[facility]    PATIENT'S NAME: Maxine Borges [place] KOBE   ATTENDING PHYSICIAN: Carolyn Hodges M.D.    PATIENT ACCOUNT #: [de-identified] LOCATION: [address] [place] [address] RECORD #: [id number] YOB: 1934   DATE OF SERVICE: 03/09/2017       CANCER SIGNS:  Her performance status is 1. Her weight is 124 pounds, blood pressure is 127/76, pulse 81, respiratory rate 20, temperature 98.7. HEENT:  Unremarkable. LYMPHATICS:  She has no adenopathy. LUNGS:  Clear.    HEART:  Normal.   ABDOMEN:  No hepato

## 2017-07-05 ENCOUNTER — NURSE ONLY (OUTPATIENT)
Dept: HEMATOLOGY/ONCOLOGY | Facility: HOSPITAL | Age: 82
End: 2017-07-05
Attending: INTERNAL MEDICINE
Payer: MEDICARE

## 2017-07-05 DIAGNOSIS — E87.2 METABOLIC ACIDOSIS: ICD-10-CM

## 2017-07-05 DIAGNOSIS — N18.4 CHRONIC KIDNEY DISEASE, STAGE IV (SEVERE) (HCC): ICD-10-CM

## 2017-07-05 DIAGNOSIS — C20 RECTAL CANCER (HCC): ICD-10-CM

## 2017-07-05 LAB
ALBUMIN SERPL-MCNC: 3.4 G/DL (ref 3.5–4.8)
ALP LIVER SERPL-CCNC: 81 U/L (ref 55–142)
ALT SERPL-CCNC: 17 U/L (ref 14–54)
AST SERPL-CCNC: 11 U/L (ref 15–41)
BASOPHILS # BLD AUTO: 0.04 X10(3) UL (ref 0–0.1)
BASOPHILS NFR BLD AUTO: 0.6 %
BILIRUB SERPL-MCNC: 2 MG/DL (ref 0.1–2)
BUN BLD-MCNC: 43 MG/DL (ref 8–20)
CALCIUM BLD-MCNC: 9.3 MG/DL (ref 8.3–10.3)
CHLORIDE: 111 MMOL/L (ref 101–111)
CO2: 19 MMOL/L (ref 22–32)
CREAT BLD-MCNC: 2.64 MG/DL (ref 0.55–1.02)
EOSINOPHIL # BLD AUTO: 0.08 X10(3) UL (ref 0–0.3)
EOSINOPHIL NFR BLD AUTO: 1.3 %
ERYTHROCYTE [DISTWIDTH] IN BLOOD BY AUTOMATED COUNT: 25.5 % (ref 11.5–16)
GLUCOSE BLD-MCNC: 96 MG/DL (ref 70–99)
HCT VFR BLD AUTO: 28.9 % (ref 34–50)
HGB BLD-MCNC: 9.2 G/DL (ref 12–16)
IMMATURE GRANULOCYTE COUNT: 0.05 X10(3) UL (ref 0–1)
IMMATURE GRANULOCYTE RATIO %: 0.8 %
LARGE PLATELETS: PRESENT
LYMPHOCYTES # BLD AUTO: 0.43 X10(3) UL (ref 0.9–4)
LYMPHOCYTES NFR BLD AUTO: 6.9 %
M PROTEIN MFR SERPL ELPH: 6.9 G/DL (ref 6.1–8.3)
MCH RBC QN AUTO: 23.4 PG (ref 27–33.2)
MCHC RBC AUTO-ENTMCNC: 31.8 G/DL (ref 31–37)
MCV RBC AUTO: 73.4 FL (ref 81–100)
MONOCYTES # BLD AUTO: 0.54 X10(3) UL (ref 0.1–0.6)
MONOCYTES NFR BLD AUTO: 8.7 %
NEUTROPHIL ABS PRELIM: 5.09 X10 (3) UL (ref 1.3–6.7)
NEUTROPHILS # BLD AUTO: 5.09 X10(3) UL (ref 1.3–6.7)
NEUTROPHILS NFR BLD AUTO: 81.7 %
PLATELET # BLD AUTO: 191 10(3)UL (ref 150–450)
POTASSIUM SERPL-SCNC: 5 MMOL/L (ref 3.6–5.1)
RBC # BLD AUTO: 3.94 X10(6)UL (ref 3.8–5.1)
RED CELL DISTRIBUTION WIDTH-SD: 64.7 FL (ref 35.1–46.3)
SODIUM SERPL-SCNC: 139 MMOL/L (ref 136–144)
WBC # BLD AUTO: 6.2 X10(3) UL (ref 4–13)

## 2017-07-05 PROCEDURE — 36415 COLL VENOUS BLD VENIPUNCTURE: CPT

## 2017-07-05 PROCEDURE — 85025 COMPLETE CBC W/AUTO DIFF WBC: CPT

## 2017-07-05 PROCEDURE — 80053 COMPREHEN METABOLIC PANEL: CPT

## 2017-07-10 PROBLEM — E87.5 HYPERKALEMIA: Status: RESOLVED | Noted: 2017-05-16 | Resolved: 2017-07-10

## 2017-07-10 PROBLEM — R07.9 CHEST PAIN, UNSPECIFIED TYPE: Status: RESOLVED | Noted: 2017-05-11 | Resolved: 2017-07-10

## 2017-07-10 PROBLEM — R73.9 HYPERGLYCEMIA: Status: RESOLVED | Noted: 2017-05-16 | Resolved: 2017-07-10

## 2017-07-10 PROBLEM — E87.1 HYPONATREMIA: Status: RESOLVED | Noted: 2017-05-16 | Resolved: 2017-07-10

## 2017-07-10 PROBLEM — N82.3 RECTOVAGINAL FISTULA: Status: ACTIVE | Noted: 2017-01-09

## 2017-07-10 PROBLEM — E83.42 HYPOMAGNESEMIA: Status: RESOLVED | Noted: 2017-06-16 | Resolved: 2017-07-10

## 2017-07-13 ENCOUNTER — NURSE ONLY (OUTPATIENT)
Dept: HEMATOLOGY/ONCOLOGY | Facility: HOSPITAL | Age: 82
End: 2017-07-13
Attending: INTERNAL MEDICINE
Payer: MEDICARE

## 2017-07-13 DIAGNOSIS — E87.2 METABOLIC ACIDOSIS: ICD-10-CM

## 2017-07-13 DIAGNOSIS — N18.4 CHRONIC KIDNEY DISEASE, STAGE IV (SEVERE) (HCC): ICD-10-CM

## 2017-07-13 DIAGNOSIS — C20 RECTAL CANCER (HCC): ICD-10-CM

## 2017-07-13 LAB
ALBUMIN SERPL-MCNC: 3.5 G/DL (ref 3.5–4.8)
ALP LIVER SERPL-CCNC: 84 U/L (ref 55–142)
ALT SERPL-CCNC: 16 U/L (ref 14–54)
AST SERPL-CCNC: 10 U/L (ref 15–41)
BASOPHILS # BLD AUTO: 0.02 X10(3) UL (ref 0–0.1)
BASOPHILS NFR BLD AUTO: 0.4 %
BILIRUB SERPL-MCNC: 2 MG/DL (ref 0.1–2)
BUN BLD-MCNC: 46 MG/DL (ref 8–20)
CALCIUM BLD-MCNC: 9.8 MG/DL (ref 8.3–10.3)
CHLORIDE: 109 MMOL/L (ref 101–111)
CO2: 19 MMOL/L (ref 22–32)
CREAT BLD-MCNC: 2.33 MG/DL (ref 0.55–1.02)
EOSINOPHIL # BLD AUTO: 0.07 X10(3) UL (ref 0–0.3)
EOSINOPHIL NFR BLD AUTO: 1.3 %
ERYTHROCYTE [DISTWIDTH] IN BLOOD BY AUTOMATED COUNT: 20.7 % (ref 11.5–16)
GLUCOSE BLD-MCNC: 90 MG/DL (ref 70–99)
HCT VFR BLD AUTO: 28 % (ref 34–50)
HGB BLD-MCNC: 9 G/DL (ref 12–16)
IMMATURE GRANULOCYTE COUNT: 0.02 X10(3) UL (ref 0–1)
IMMATURE GRANULOCYTE RATIO %: 0.4 %
LYMPHOCYTES # BLD AUTO: 0.52 X10(3) UL (ref 0.9–4)
LYMPHOCYTES NFR BLD AUTO: 9.7 %
M PROTEIN MFR SERPL ELPH: 6.9 G/DL (ref 6.1–8.3)
MCH RBC QN AUTO: 23.8 PG (ref 27–33.2)
MCHC RBC AUTO-ENTMCNC: 32.1 G/DL (ref 31–37)
MCV RBC AUTO: 74.1 FL (ref 81–100)
MONOCYTES # BLD AUTO: 0.49 X10(3) UL (ref 0.1–0.6)
MONOCYTES NFR BLD AUTO: 9.2 %
NEUTROPHIL ABS PRELIM: 4.23 X10 (3) UL (ref 1.3–6.7)
NEUTROPHILS # BLD AUTO: 4.23 X10(3) UL (ref 1.3–6.7)
NEUTROPHILS NFR BLD AUTO: 79 %
PLATELET # BLD AUTO: 250 10(3)UL (ref 150–450)
POTASSIUM SERPL-SCNC: 5.5 MMOL/L (ref 3.6–5.1)
RBC # BLD AUTO: 3.78 X10(6)UL (ref 3.8–5.1)
RED CELL DISTRIBUTION WIDTH-SD: 54.8 FL (ref 35.1–46.3)
SODIUM SERPL-SCNC: 137 MMOL/L (ref 136–144)
WBC # BLD AUTO: 5.4 X10(3) UL (ref 4–13)

## 2017-07-13 PROCEDURE — 36415 COLL VENOUS BLD VENIPUNCTURE: CPT

## 2017-07-13 PROCEDURE — 85025 COMPLETE CBC W/AUTO DIFF WBC: CPT

## 2017-07-13 PROCEDURE — 80053 COMPREHEN METABOLIC PANEL: CPT

## 2017-07-19 ENCOUNTER — NURSE ONLY (OUTPATIENT)
Dept: NEPHROLOGY | Facility: CLINIC | Age: 82
End: 2017-07-19

## 2017-07-19 ENCOUNTER — NURSE ONLY (OUTPATIENT)
Dept: HEMATOLOGY/ONCOLOGY | Facility: HOSPITAL | Age: 82
End: 2017-07-19
Attending: INTERNAL MEDICINE
Payer: MEDICARE

## 2017-07-19 VITALS — SYSTOLIC BLOOD PRESSURE: 146 MMHG | BODY MASS INDEX: 19 KG/M2 | DIASTOLIC BLOOD PRESSURE: 68 MMHG | WEIGHT: 115 LBS

## 2017-07-19 DIAGNOSIS — N18.4 CHRONIC KIDNEY DISEASE, STAGE IV (SEVERE) (HCC): ICD-10-CM

## 2017-07-19 DIAGNOSIS — C20 RECTAL CANCER (HCC): ICD-10-CM

## 2017-07-19 DIAGNOSIS — N18.4 ANEMIA IN STAGE 4 CHRONIC KIDNEY DISEASE (HCC): ICD-10-CM

## 2017-07-19 DIAGNOSIS — N18.4 CKD (CHRONIC KIDNEY DISEASE) STAGE 4, GFR 15-29 ML/MIN (HCC): ICD-10-CM

## 2017-07-19 DIAGNOSIS — D63.1 ANEMIA IN STAGE 4 CHRONIC KIDNEY DISEASE (HCC): ICD-10-CM

## 2017-07-19 DIAGNOSIS — E87.2 METABOLIC ACIDOSIS: ICD-10-CM

## 2017-07-19 LAB
ALBUMIN SERPL-MCNC: 3.7 G/DL (ref 3.5–4.8)
ALP LIVER SERPL-CCNC: 91 U/L (ref 55–142)
ALT SERPL-CCNC: 18 U/L (ref 14–54)
AST SERPL-CCNC: 13 U/L (ref 15–41)
BASOPHILS # BLD AUTO: 0.05 X10(3) UL (ref 0–0.1)
BASOPHILS NFR BLD AUTO: 0.9 %
BILIRUB SERPL-MCNC: 1.6 MG/DL (ref 0.1–2)
BUN BLD-MCNC: 41 MG/DL (ref 8–20)
CALCIUM BLD-MCNC: 10.2 MG/DL (ref 8.3–10.3)
CHLORIDE: 111 MMOL/L (ref 101–111)
CO2: 18 MMOL/L (ref 22–32)
CREAT BLD-MCNC: 2.33 MG/DL (ref 0.55–1.02)
EOSINOPHIL # BLD AUTO: 0.05 X10(3) UL (ref 0–0.3)
EOSINOPHIL NFR BLD AUTO: 0.9 %
ERYTHROCYTE [DISTWIDTH] IN BLOOD BY AUTOMATED COUNT: 18.6 % (ref 11.5–16)
GLUCOSE BLD-MCNC: 92 MG/DL (ref 70–99)
HCT VFR BLD AUTO: 29.7 % (ref 34–50)
HGB BLD-MCNC: 9.4 G/DL (ref 12–16)
IMMATURE GRANULOCYTE COUNT: 0.02 X10(3) UL (ref 0–1)
IMMATURE GRANULOCYTE RATIO %: 0.3 %
LYMPHOCYTES # BLD AUTO: 0.48 X10(3) UL (ref 0.9–4)
LYMPHOCYTES NFR BLD AUTO: 8.3 %
M PROTEIN MFR SERPL ELPH: 7.1 G/DL (ref 6.1–8.3)
MCH RBC QN AUTO: 23.7 PG (ref 27–33.2)
MCHC RBC AUTO-ENTMCNC: 31.6 G/DL (ref 31–37)
MCV RBC AUTO: 74.8 FL (ref 81–100)
MONOCYTES # BLD AUTO: 0.41 X10(3) UL (ref 0.1–0.6)
MONOCYTES NFR BLD AUTO: 7.1 %
NEUTROPHIL ABS PRELIM: 4.76 X10 (3) UL (ref 1.3–6.7)
NEUTROPHILS # BLD AUTO: 4.76 X10(3) UL (ref 1.3–6.7)
NEUTROPHILS NFR BLD AUTO: 82.5 %
PLATELET # BLD AUTO: 289 10(3)UL (ref 150–450)
POTASSIUM SERPL-SCNC: 4.7 MMOL/L (ref 3.6–5.1)
RBC # BLD AUTO: 3.97 X10(6)UL (ref 3.8–5.1)
RED CELL DISTRIBUTION WIDTH-SD: 49.5 FL (ref 35.1–46.3)
SODIUM SERPL-SCNC: 137 MMOL/L (ref 136–144)
WBC # BLD AUTO: 5.8 X10(3) UL (ref 4–13)

## 2017-07-19 PROCEDURE — 36415 COLL VENOUS BLD VENIPUNCTURE: CPT

## 2017-07-19 PROCEDURE — 96372 THER/PROPH/DIAG INJ SC/IM: CPT | Performed by: INTERNAL MEDICINE

## 2017-07-19 PROCEDURE — 85025 COMPLETE CBC W/AUTO DIFF WBC: CPT

## 2017-07-19 PROCEDURE — 80053 COMPREHEN METABOLIC PANEL: CPT

## 2017-07-22 ENCOUNTER — TELEPHONE (OUTPATIENT)
Dept: NEPHROLOGY | Facility: CLINIC | Age: 82
End: 2017-07-22

## 2017-07-23 NOTE — TELEPHONE ENCOUNTER
DIscussed with pt- labs stable; needs EPO for hgb 9; to continue monthly labs- davidx Kodi Nichols- can you make sure she gets aranesp- iris allen

## 2017-07-26 ENCOUNTER — NURSE ONLY (OUTPATIENT)
Dept: HEMATOLOGY/ONCOLOGY | Facility: HOSPITAL | Age: 82
End: 2017-07-26
Attending: INTERNAL MEDICINE
Payer: MEDICARE

## 2017-07-26 DIAGNOSIS — C20 RECTAL CANCER (HCC): ICD-10-CM

## 2017-07-26 DIAGNOSIS — E87.2 METABOLIC ACIDOSIS: ICD-10-CM

## 2017-07-26 DIAGNOSIS — N18.4 CHRONIC KIDNEY DISEASE, STAGE IV (SEVERE) (HCC): ICD-10-CM

## 2017-07-26 LAB
ALBUMIN SERPL-MCNC: 3.5 G/DL (ref 3.5–4.8)
ALP LIVER SERPL-CCNC: 102 U/L (ref 55–142)
ALT SERPL-CCNC: 15 U/L (ref 14–54)
AST SERPL-CCNC: 13 U/L (ref 15–41)
BASOPHILS # BLD AUTO: 0.04 X10(3) UL (ref 0–0.1)
BASOPHILS NFR BLD AUTO: 0.6 %
BILIRUB SERPL-MCNC: 1.3 MG/DL (ref 0.1–2)
BUN BLD-MCNC: 38 MG/DL (ref 8–20)
CALCIUM BLD-MCNC: 10 MG/DL (ref 8.3–10.3)
CHLORIDE: 110 MMOL/L (ref 101–111)
CO2: 19 MMOL/L (ref 22–32)
CREAT BLD-MCNC: 2.28 MG/DL (ref 0.55–1.02)
EOSINOPHIL # BLD AUTO: 0.08 X10(3) UL (ref 0–0.3)
EOSINOPHIL NFR BLD AUTO: 1.2 %
ERYTHROCYTE [DISTWIDTH] IN BLOOD BY AUTOMATED COUNT: 19.2 % (ref 11.5–16)
GLUCOSE BLD-MCNC: 95 MG/DL (ref 70–99)
HCT VFR BLD AUTO: 30.7 % (ref 34–50)
HGB BLD-MCNC: 10 G/DL (ref 12–16)
IMMATURE GRANULOCYTE COUNT: 0.07 X10(3) UL (ref 0–1)
IMMATURE GRANULOCYTE RATIO %: 1 %
LYMPHOCYTES # BLD AUTO: 0.6 X10(3) UL (ref 0.9–4)
LYMPHOCYTES NFR BLD AUTO: 8.7 %
M PROTEIN MFR SERPL ELPH: 7.2 G/DL (ref 6.1–8.3)
MCH RBC QN AUTO: 23.9 PG (ref 27–33.2)
MCHC RBC AUTO-ENTMCNC: 32.6 G/DL (ref 31–37)
MCV RBC AUTO: 73.4 FL (ref 81–100)
MONOCYTES # BLD AUTO: 0.66 X10(3) UL (ref 0.1–0.6)
MONOCYTES NFR BLD AUTO: 9.5 %
NEUTROPHIL ABS PRELIM: 5.47 X10 (3) UL (ref 1.3–6.7)
NEUTROPHILS # BLD AUTO: 5.47 X10(3) UL (ref 1.3–6.7)
NEUTROPHILS NFR BLD AUTO: 79 %
PLATELET # BLD AUTO: 215 10(3)UL (ref 150–450)
POTASSIUM SERPL-SCNC: 4.8 MMOL/L (ref 3.6–5.1)
RBC # BLD AUTO: 4.18 X10(6)UL (ref 3.8–5.1)
RED CELL DISTRIBUTION WIDTH-SD: 47 FL (ref 35.1–46.3)
SODIUM SERPL-SCNC: 138 MMOL/L (ref 136–144)
WBC # BLD AUTO: 6.9 X10(3) UL (ref 4–13)

## 2017-07-26 PROCEDURE — 36415 COLL VENOUS BLD VENIPUNCTURE: CPT

## 2017-07-26 PROCEDURE — 80053 COMPREHEN METABOLIC PANEL: CPT

## 2017-07-26 PROCEDURE — 85025 COMPLETE CBC W/AUTO DIFF WBC: CPT

## 2017-07-26 NOTE — PROGRESS NOTES
[facility] RAD[facility]REATMENT SUMMARY    PATIENT:  Denver Haney MD: Dr. 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

## 2017-07-27 ENCOUNTER — HOSPITAL ENCOUNTER (OUTPATIENT)
Dept: RADIATION ONCOLOGY | Age: 82
Discharge: HOME | End: 2017-07-27
Attending: INTERNAL MEDICINE
Payer: MEDICARE

## 2017-07-27 VITALS
WEIGHT: 117.81 LBS | HEART RATE: 103 BPM | TEMPERATURE: 98 F | RESPIRATION RATE: 18 BRPM | SYSTOLIC BLOOD PRESSURE: 125 MMHG | DIASTOLIC BLOOD PRESSURE: 72 MMHG | OXYGEN SATURATION: 97 % | BODY MASS INDEX: 20 KG/M2

## 2017-07-27 DIAGNOSIS — C21.1 ADENOCARCINOMA OF ANAL CANAL (HCC): Primary | ICD-10-CM

## 2017-07-27 PROCEDURE — 99213 OFFICE O/P EST LOW 20 MIN: CPT

## 2017-07-27 NOTE — PROGRESS NOTES
Nursing Follow-Up Note    Patient: Norm Brink  YOB: 1934  Age: 80year old  Radiation Oncologist: Dr. Marcello Laguerre  Referring Physician: Dr. Charlie Brewer  Chief Complaint: Patient presents with:   Follow - Up: ANORECTAL CA    Date: 7/27/20

## 2017-07-27 NOTE — PATIENT INSTRUCTIONS
- CALL [phone] FOR A FOLLOW-UP WITH DR. WILCOX IN 6 MONTHS Nelsy Butler 2018); PLEASE CALL A MONTH EARLY TO SCHEDULE YOUR APPOINTMENT  - FOLLOW-UP WITH DR. DIAS AS SCHEDULED   - CALL THE NURSES' LINE AT [phone] IF YOU HAVE ANY QUESTIONS/CONCERNS

## 2017-07-27 NOTE — PROGRESS NOTES
[facility]    PATIENT:   Gonzalez Rhoades      7/28/1934    DIAGNOSIS:   Adenocarcinoma of the anal canal/rectum      CANCER HISTORY:  80-year-old woman with a long history of Crohn's colitis, with long-standing ileos appearing. The perineum and the posterior vagina is somewhat still red. The perianal skin appears slightly erythematous but not desquamated. No ulcerations or mass is palpable. She still has tiny subcutaneous areas of nodularity.     IMPRESSION:   She i

## 2017-07-31 ENCOUNTER — SNF/IP PROF CHARGE ONLY (OUTPATIENT)
Dept: HEMATOLOGY/ONCOLOGY | Facility: HOSPITAL | Age: 82
End: 2017-07-31

## 2017-07-31 DIAGNOSIS — C20 RECTAL CANCER (HCC): ICD-10-CM

## 2017-07-31 PROCEDURE — G9678 ONCOLOGY CARE MODEL SERVICE: HCPCS | Performed by: INTERNAL MEDICINE

## 2017-08-02 ENCOUNTER — NURSE ONLY (OUTPATIENT)
Dept: HEMATOLOGY/ONCOLOGY | Facility: HOSPITAL | Age: 82
End: 2017-08-02
Attending: INTERNAL MEDICINE
Payer: MEDICARE

## 2017-08-02 DIAGNOSIS — Z09 CHEMOTHERAPY FOLLOW-UP EXAMINATION: ICD-10-CM

## 2017-08-02 DIAGNOSIS — N18.30 STAGE 3 CHRONIC KIDNEY DISEASE (HCC): ICD-10-CM

## 2017-08-02 DIAGNOSIS — C20 RECTAL CANCER (HCC): ICD-10-CM

## 2017-08-02 LAB
ALBUMIN SERPL-MCNC: 3.6 G/DL (ref 3.5–4.8)
ALP LIVER SERPL-CCNC: 92 U/L (ref 55–142)
ALT SERPL-CCNC: 15 U/L (ref 14–54)
AST SERPL-CCNC: 17 U/L (ref 15–41)
BASOPHILS # BLD AUTO: 0.03 X10(3) UL (ref 0–0.1)
BASOPHILS NFR BLD AUTO: 0.5 %
BILIRUB SERPL-MCNC: 2 MG/DL (ref 0.1–2)
BUN BLD-MCNC: 46 MG/DL (ref 8–20)
CALCIUM BLD-MCNC: 9.7 MG/DL (ref 8.3–10.3)
CHLORIDE: 114 MMOL/L (ref 101–111)
CO2: 15 MMOL/L (ref 22–32)
CREAT BLD-MCNC: 2.32 MG/DL (ref 0.55–1.02)
EOSINOPHIL # BLD AUTO: 0.06 X10(3) UL (ref 0–0.3)
EOSINOPHIL NFR BLD AUTO: 1 %
ERYTHROCYTE [DISTWIDTH] IN BLOOD BY AUTOMATED COUNT: 19.8 % (ref 11.5–16)
GLUCOSE BLD-MCNC: 87 MG/DL (ref 70–99)
HCT VFR BLD AUTO: 32.4 % (ref 34–50)
HGB BLD-MCNC: 10.1 G/DL (ref 12–16)
IMMATURE GRANULOCYTE COUNT: 0.03 X10(3) UL (ref 0–1)
IMMATURE GRANULOCYTE RATIO %: 0.5 %
LYMPHOCYTES # BLD AUTO: 0.46 X10(3) UL (ref 0.9–4)
LYMPHOCYTES NFR BLD AUTO: 7.8 %
M PROTEIN MFR SERPL ELPH: 6.9 G/DL (ref 6.1–8.3)
MCH RBC QN AUTO: 23.9 PG (ref 27–33.2)
MCHC RBC AUTO-ENTMCNC: 31.2 G/DL (ref 31–37)
MCV RBC AUTO: 76.8 FL (ref 81–100)
MONOCYTES # BLD AUTO: 0.31 X10(3) UL (ref 0.1–0.6)
MONOCYTES NFR BLD AUTO: 5.2 %
NEUTROPHIL ABS PRELIM: 5.04 X10 (3) UL (ref 1.3–6.7)
NEUTROPHILS # BLD AUTO: 5.04 X10(3) UL (ref 1.3–6.7)
NEUTROPHILS NFR BLD AUTO: 85 %
PLATELET # BLD AUTO: 213 10(3)UL (ref 150–450)
POTASSIUM SERPL-SCNC: 5.6 MMOL/L (ref 3.6–5.1)
RBC # BLD AUTO: 4.22 X10(6)UL (ref 3.8–5.1)
RED CELL DISTRIBUTION WIDTH-SD: 52.8 FL (ref 35.1–46.3)
SODIUM SERPL-SCNC: 138 MMOL/L (ref 136–144)
WBC # BLD AUTO: 5.9 X10(3) UL (ref 4–13)

## 2017-08-02 PROCEDURE — 80053 COMPREHEN METABOLIC PANEL: CPT

## 2017-08-02 PROCEDURE — 36415 COLL VENOUS BLD VENIPUNCTURE: CPT

## 2017-08-02 PROCEDURE — 85025 COMPLETE CBC W/AUTO DIFF WBC: CPT

## 2017-08-03 ENCOUNTER — OFFICE VISIT (OUTPATIENT)
Dept: HEMATOLOGY/ONCOLOGY | Age: 82
End: 2017-08-03
Attending: INTERNAL MEDICINE
Payer: MEDICARE

## 2017-08-03 VITALS
WEIGHT: 115 LBS | RESPIRATION RATE: 18 BRPM | TEMPERATURE: 99 F | OXYGEN SATURATION: 98 % | BODY MASS INDEX: 19 KG/M2 | HEART RATE: 71 BPM | SYSTOLIC BLOOD PRESSURE: 123 MMHG | DIASTOLIC BLOOD PRESSURE: 64 MMHG

## 2017-08-03 DIAGNOSIS — N18.30 STAGE 3 CHRONIC KIDNEY DISEASE (HCC): ICD-10-CM

## 2017-08-03 DIAGNOSIS — N18.30 ANEMIA IN STAGE 3 CHRONIC KIDNEY DISEASE (HCC): ICD-10-CM

## 2017-08-03 DIAGNOSIS — C20 RECTAL CANCER (HCC): Primary | ICD-10-CM

## 2017-08-03 DIAGNOSIS — D63.1 ANEMIA IN STAGE 3 CHRONIC KIDNEY DISEASE (HCC): ICD-10-CM

## 2017-08-03 PROCEDURE — 99214 OFFICE O/P EST MOD 30 MIN: CPT | Performed by: INTERNAL MEDICINE

## 2017-08-03 NOTE — PROGRESS NOTES
Pt here for 6 week MD f/u. Pt's energy level is increasing and feeling good, appetite is good. Pt notes having continued itching and burning in perineum, using Aquaphor. Pt getting Aranesp injections with Dr. Foster . Pt has no further complaints.      Patricia Culp

## 2017-08-16 ENCOUNTER — NURSE ONLY (OUTPATIENT)
Dept: HEMATOLOGY/ONCOLOGY | Facility: HOSPITAL | Age: 82
End: 2017-08-16
Attending: INTERNAL MEDICINE
Payer: MEDICARE

## 2017-08-16 DIAGNOSIS — C20 RECTAL CANCER (HCC): ICD-10-CM

## 2017-08-16 PROCEDURE — 36415 COLL VENOUS BLD VENIPUNCTURE: CPT

## 2017-08-22 ENCOUNTER — NURSE ONLY (OUTPATIENT)
Dept: HEMATOLOGY/ONCOLOGY | Facility: HOSPITAL | Age: 82
End: 2017-08-22
Attending: INTERNAL MEDICINE
Payer: MEDICARE

## 2017-08-22 DIAGNOSIS — C20 RECTAL CANCER (HCC): ICD-10-CM

## 2017-08-22 LAB
ALBUMIN SERPL-MCNC: 3.5 G/DL (ref 3.5–4.8)
ALP LIVER SERPL-CCNC: 104 U/L (ref 55–142)
ALT SERPL-CCNC: 18 U/L (ref 14–54)
AST SERPL-CCNC: 14 U/L (ref 15–41)
BASOPHILS # BLD AUTO: 0.05 X10(3) UL (ref 0–0.1)
BASOPHILS NFR BLD AUTO: 0.9 %
BILIRUB SERPL-MCNC: 0.9 MG/DL (ref 0.1–2)
BUN BLD-MCNC: 44 MG/DL (ref 8–20)
CALCIUM BLD-MCNC: 10.5 MG/DL (ref 8.3–10.3)
CEA: 5.4 NG/ML (ref 0.5–5)
CHLORIDE: 113 MMOL/L (ref 101–111)
CO2: 17 MMOL/L (ref 22–32)
CREAT BLD-MCNC: 2.41 MG/DL (ref 0.55–1.02)
EOSINOPHIL # BLD AUTO: 0.09 X10(3) UL (ref 0–0.3)
EOSINOPHIL NFR BLD AUTO: 1.6 %
ERYTHROCYTE [DISTWIDTH] IN BLOOD BY AUTOMATED COUNT: 16.1 % (ref 11.5–16)
GLUCOSE BLD-MCNC: 96 MG/DL (ref 70–99)
HCT VFR BLD AUTO: 33.7 % (ref 34–50)
HGB BLD-MCNC: 10.6 G/DL (ref 12–16)
IMMATURE GRANULOCYTE COUNT: 0.02 X10(3) UL (ref 0–1)
IMMATURE GRANULOCYTE RATIO %: 0.4 %
LYMPHOCYTES # BLD AUTO: 0.49 X10(3) UL (ref 0.9–4)
LYMPHOCYTES NFR BLD AUTO: 8.6 %
M PROTEIN MFR SERPL ELPH: 7 G/DL (ref 6.1–8.3)
MCH RBC QN AUTO: 22.9 PG (ref 27–33.2)
MCHC RBC AUTO-ENTMCNC: 31.5 G/DL (ref 31–37)
MCV RBC AUTO: 72.9 FL (ref 81–100)
MONOCYTES # BLD AUTO: 0.38 X10(3) UL (ref 0.1–0.6)
MONOCYTES NFR BLD AUTO: 6.7 %
NEUTROPHIL ABS PRELIM: 4.67 X10 (3) UL (ref 1.3–6.7)
NEUTROPHILS # BLD AUTO: 4.67 X10(3) UL (ref 1.3–6.7)
NEUTROPHILS NFR BLD AUTO: 81.8 %
PLATELET # BLD AUTO: 163 10(3)UL (ref 150–450)
POTASSIUM SERPL-SCNC: 5.1 MMOL/L (ref 3.6–5.1)
RBC # BLD AUTO: 4.62 X10(6)UL (ref 3.8–5.1)
RED CELL DISTRIBUTION WIDTH-SD: 41.4 FL (ref 35.1–46.3)
SODIUM SERPL-SCNC: 138 MMOL/L (ref 136–144)
WBC # BLD AUTO: 5.7 X10(3) UL (ref 4–13)

## 2017-08-22 PROCEDURE — 36415 COLL VENOUS BLD VENIPUNCTURE: CPT

## 2017-08-22 PROCEDURE — 82378 CARCINOEMBRYONIC ANTIGEN: CPT

## 2017-08-22 PROCEDURE — 80053 COMPREHEN METABOLIC PANEL: CPT

## 2017-08-22 PROCEDURE — 85025 COMPLETE CBC W/AUTO DIFF WBC: CPT

## 2017-08-28 ENCOUNTER — TELEPHONE (OUTPATIENT)
Dept: WOUND CARE | Facility: HOSPITAL | Age: 82
End: 2017-08-28

## 2017-08-28 NOTE — TELEPHONE ENCOUNTER
Patient called to speak with ostomy nurse. Pt having occasional itching around stoma. Has used powder regularly.    Recommended pt try OTC hydrocortisone cream in very light amount till it absorbs in then powder over that as usual.  Pt will try and call ba

## 2017-08-31 ENCOUNTER — SNF/IP PROF CHARGE ONLY (OUTPATIENT)
Dept: HEMATOLOGY/ONCOLOGY | Facility: HOSPITAL | Age: 82
End: 2017-08-31

## 2017-08-31 DIAGNOSIS — C20 RECTAL CANCER (HCC): ICD-10-CM

## 2017-08-31 PROCEDURE — G9678 ONCOLOGY CARE MODEL SERVICE: HCPCS | Performed by: INTERNAL MEDICINE

## 2017-09-07 ENCOUNTER — NURSE ONLY (OUTPATIENT)
Dept: HEMATOLOGY/ONCOLOGY | Age: 82
End: 2017-09-07
Attending: INTERNAL MEDICINE
Payer: MEDICARE

## 2017-09-07 DIAGNOSIS — C20 RECTAL CANCER (HCC): ICD-10-CM

## 2017-09-07 LAB
ALBUMIN SERPL-MCNC: 3.5 G/DL (ref 3.5–4.8)
ALP LIVER SERPL-CCNC: 116 U/L (ref 55–142)
ALT SERPL-CCNC: 20 U/L (ref 14–54)
AST SERPL-CCNC: 11 U/L (ref 15–41)
BASOPHILS # BLD AUTO: 0.03 X10(3) UL (ref 0–0.1)
BASOPHILS NFR BLD AUTO: 0.4 %
BILIRUB SERPL-MCNC: 0.7 MG/DL (ref 0.1–2)
BUN BLD-MCNC: 56 MG/DL (ref 8–20)
CALCIUM BLD-MCNC: 9.8 MG/DL (ref 8.3–10.3)
CEA: 4.6 NG/ML (ref 0.5–5)
CHLORIDE: 109 MMOL/L (ref 101–111)
CO2: 15 MMOL/L (ref 22–32)
CREAT BLD-MCNC: 2.82 MG/DL (ref 0.55–1.02)
EOSINOPHIL # BLD AUTO: 0.1 X10(3) UL (ref 0–0.3)
EOSINOPHIL NFR BLD AUTO: 1.4 %
ERYTHROCYTE [DISTWIDTH] IN BLOOD BY AUTOMATED COUNT: 15.9 % (ref 11.5–16)
GLUCOSE BLD-MCNC: 121 MG/DL (ref 70–99)
HCT VFR BLD AUTO: 32.8 % (ref 34–50)
HGB BLD-MCNC: 10.6 G/DL (ref 12–16)
IMMATURE GRANULOCYTE COUNT: 0.03 X10(3) UL (ref 0–1)
IMMATURE GRANULOCYTE RATIO %: 0.4 %
LYMPHOCYTES # BLD AUTO: 0.55 X10(3) UL (ref 0.9–4)
LYMPHOCYTES NFR BLD AUTO: 7.7 %
M PROTEIN MFR SERPL ELPH: 7.3 G/DL (ref 6.1–8.3)
MCH RBC QN AUTO: 22.6 PG (ref 27–33.2)
MCHC RBC AUTO-ENTMCNC: 32.3 G/DL (ref 31–37)
MCV RBC AUTO: 70.1 FL (ref 81–100)
MONOCYTES # BLD AUTO: 0.42 X10(3) UL (ref 0.1–0.6)
MONOCYTES NFR BLD AUTO: 5.9 %
NEUTROPHIL ABS PRELIM: 5.99 X10 (3) UL (ref 1.3–6.7)
NEUTROPHILS # BLD AUTO: 5.99 X10(3) UL (ref 1.3–6.7)
NEUTROPHILS NFR BLD AUTO: 84.2 %
PLATELET # BLD AUTO: 245 10(3)UL (ref 150–450)
POTASSIUM SERPL-SCNC: 5.4 MMOL/L (ref 3.6–5.1)
RBC # BLD AUTO: 4.68 X10(6)UL (ref 3.8–5.1)
RED CELL DISTRIBUTION WIDTH-SD: 38.8 FL (ref 35.1–46.3)
SODIUM SERPL-SCNC: 134 MMOL/L (ref 136–144)
WBC # BLD AUTO: 7.1 X10(3) UL (ref 4–13)

## 2017-09-07 PROCEDURE — 85025 COMPLETE CBC W/AUTO DIFF WBC: CPT

## 2017-09-07 PROCEDURE — 36415 COLL VENOUS BLD VENIPUNCTURE: CPT

## 2017-09-07 PROCEDURE — 82378 CARCINOEMBRYONIC ANTIGEN: CPT

## 2017-09-07 PROCEDURE — 80053 COMPREHEN METABOLIC PANEL: CPT

## 2017-09-09 ENCOUNTER — HOSPITAL ENCOUNTER (OUTPATIENT)
Dept: CT IMAGING | Facility: HOSPITAL | Age: 82
Discharge: HOME | End: 2017-09-09
Attending: INTERNAL MEDICINE
Payer: MEDICARE

## 2017-09-09 DIAGNOSIS — C20 RECTAL CANCER (HCC): ICD-10-CM

## 2017-09-09 PROCEDURE — 74176 CT ABD & PELVIS W/O CONTRAST: CPT | Performed by: INTERNAL MEDICINE

## 2017-09-10 ENCOUNTER — TELEPHONE (OUTPATIENT)
Dept: NEPHROLOGY | Facility: CLINIC | Age: 82
End: 2017-09-10

## 2017-09-11 ENCOUNTER — TELEPHONE (OUTPATIENT)
Dept: HEMATOLOGY/ONCOLOGY | Facility: HOSPITAL | Age: 82
End: 2017-09-11

## 2017-09-11 NOTE — TELEPHONE ENCOUNTER
Test(s) completed: CT scan  Results: per Dr Rhea Santoro \"Your CT is ok. [place] is no evidence of anything spreading anywhere and the local area looks stable. [place] [employer] will always be an abnormal appearance to it.  This plus the lower CEA suggests things are contr

## 2017-09-11 NOTE — TELEPHONE ENCOUNTER
Discussed with pt- renal function a little worse; increase fluid intake due to high output ostomy; hold EPO this month- repeat labs in October- thx tiffany

## 2017-09-28 ENCOUNTER — NURSE ONLY (OUTPATIENT)
Dept: HEMATOLOGY/ONCOLOGY | Age: 82
End: 2017-09-28
Attending: INTERNAL MEDICINE
Payer: MEDICARE

## 2017-09-28 ENCOUNTER — TELEPHONE (OUTPATIENT)
Dept: HEMATOLOGY/ONCOLOGY | Facility: HOSPITAL | Age: 82
End: 2017-09-28

## 2017-09-28 DIAGNOSIS — C20 RECTAL CANCER (HCC): ICD-10-CM

## 2017-09-28 LAB
ALBUMIN SERPL-MCNC: 3.3 G/DL (ref 3.5–4.8)
ALP LIVER SERPL-CCNC: 107 U/L (ref 55–142)
ALT SERPL-CCNC: 17 U/L (ref 14–54)
AST SERPL-CCNC: 13 U/L (ref 15–41)
BASOPHILS # BLD AUTO: 0.04 X10(3) UL (ref 0–0.1)
BASOPHILS NFR BLD AUTO: 0.6 %
BILIRUB SERPL-MCNC: 0.6 MG/DL (ref 0.1–2)
BUN BLD-MCNC: 48 MG/DL (ref 8–20)
CALCIUM BLD-MCNC: 10.2 MG/DL (ref 8.3–10.3)
CEA: 4.7 NG/ML (ref 0.5–5)
CHLORIDE: 115 MMOL/L (ref 101–111)
CO2: 17 MMOL/L (ref 22–32)
CREAT BLD-MCNC: 2.77 MG/DL (ref 0.55–1.02)
EOSINOPHIL # BLD AUTO: 0.12 X10(3) UL (ref 0–0.3)
EOSINOPHIL NFR BLD AUTO: 1.9 %
ERYTHROCYTE [DISTWIDTH] IN BLOOD BY AUTOMATED COUNT: 16.7 % (ref 11.5–16)
GLUCOSE BLD-MCNC: 94 MG/DL (ref 70–99)
HCT VFR BLD AUTO: 28.2 % (ref 34–50)
HGB BLD-MCNC: 9.2 G/DL (ref 12–16)
IMMATURE GRANULOCYTE COUNT: 0.04 X10(3) UL (ref 0–1)
IMMATURE GRANULOCYTE RATIO %: 0.6 %
LYMPHOCYTES # BLD AUTO: 0.54 X10(3) UL (ref 0.9–4)
LYMPHOCYTES NFR BLD AUTO: 8.6 %
M PROTEIN MFR SERPL ELPH: 7.3 G/DL (ref 6.1–8.3)
MCH RBC QN AUTO: 22.1 PG (ref 27–33.2)
MCHC RBC AUTO-ENTMCNC: 32.6 G/DL (ref 31–37)
MCV RBC AUTO: 67.8 FL (ref 81–100)
MONOCYTES # BLD AUTO: 0.48 X10(3) UL (ref 0.1–0.6)
MONOCYTES NFR BLD AUTO: 7.6 %
NEUTROPHIL ABS PRELIM: 5.06 X10 (3) UL (ref 1.3–6.7)
NEUTROPHILS # BLD AUTO: 5.06 X10(3) UL (ref 1.3–6.7)
NEUTROPHILS NFR BLD AUTO: 80.7 %
PLATELET # BLD AUTO: 222 10(3)UL (ref 150–450)
POTASSIUM SERPL-SCNC: 5.2 MMOL/L (ref 3.6–5.1)
RBC # BLD AUTO: 4.16 X10(6)UL (ref 3.8–5.1)
RED CELL DISTRIBUTION WIDTH-SD: 39.3 FL (ref 35.1–46.3)
SODIUM SERPL-SCNC: 140 MMOL/L (ref 136–144)
WBC # BLD AUTO: 6.3 X10(3) UL (ref 4–13)

## 2017-09-28 PROCEDURE — 85025 COMPLETE CBC W/AUTO DIFF WBC: CPT

## 2017-09-28 PROCEDURE — 82378 CARCINOEMBRYONIC ANTIGEN: CPT

## 2017-09-28 PROCEDURE — 36415 COLL VENOUS BLD VENIPUNCTURE: CPT

## 2017-09-28 PROCEDURE — 80053 COMPREHEN METABOLIC PANEL: CPT

## 2017-09-28 NOTE — TELEPHONE ENCOUNTER
Requesting to come today to [facility] for lab work. Orders in chart for every 2 week labs. Appt made for 1100 today. Verbalized understanding.

## 2017-09-30 ENCOUNTER — SNF/IP PROF CHARGE ONLY (OUTPATIENT)
Dept: HEMATOLOGY/ONCOLOGY | Facility: HOSPITAL | Age: 82
End: 2017-09-30

## 2017-09-30 ENCOUNTER — TELEPHONE (OUTPATIENT)
Dept: NEPHROLOGY | Facility: CLINIC | Age: 82
End: 2017-09-30

## 2017-09-30 DIAGNOSIS — C20 RECTAL CANCER (HCC): ICD-10-CM

## 2017-09-30 PROCEDURE — G9678 ONCOLOGY CARE MODEL SERVICE: HCPCS | Performed by: INTERNAL MEDICINE

## 2017-10-10 ENCOUNTER — NURSE ONLY (OUTPATIENT)
Dept: HEMATOLOGY/ONCOLOGY | Facility: HOSPITAL | Age: 82
End: 2017-10-10
Attending: INTERNAL MEDICINE
Payer: MEDICARE

## 2017-10-10 DIAGNOSIS — C20 RECTAL CANCER (HCC): ICD-10-CM

## 2017-10-10 PROCEDURE — 80053 COMPREHEN METABOLIC PANEL: CPT

## 2017-10-10 PROCEDURE — 36415 COLL VENOUS BLD VENIPUNCTURE: CPT

## 2017-10-10 PROCEDURE — 85025 COMPLETE CBC W/AUTO DIFF WBC: CPT

## 2017-10-10 PROCEDURE — 82378 CARCINOEMBRYONIC ANTIGEN: CPT

## 2017-10-15 ENCOUNTER — TELEPHONE (OUTPATIENT)
Dept: NEPHROLOGY | Facility: CLINIC | Age: 82
End: 2017-10-15

## 2017-10-17 ENCOUNTER — NURSE ONLY (OUTPATIENT)
Dept: NEPHROLOGY | Facility: CLINIC | Age: 82
End: 2017-10-17

## 2017-10-17 VITALS — SYSTOLIC BLOOD PRESSURE: 114 MMHG | WEIGHT: 114 LBS | BODY MASS INDEX: 19 KG/M2 | DIASTOLIC BLOOD PRESSURE: 66 MMHG

## 2017-10-17 DIAGNOSIS — D63.1 ANEMIA IN STAGE 4 CHRONIC KIDNEY DISEASE (HCC): ICD-10-CM

## 2017-10-17 DIAGNOSIS — N18.4 ANEMIA IN STAGE 4 CHRONIC KIDNEY DISEASE (HCC): ICD-10-CM

## 2017-10-17 DIAGNOSIS — N18.4 CKD (CHRONIC KIDNEY DISEASE) STAGE 4, GFR 15-29 ML/MIN (HCC): ICD-10-CM

## 2017-10-17 PROCEDURE — 96372 THER/PROPH/DIAG INJ SC/IM: CPT | Performed by: INTERNAL MEDICINE

## 2017-10-18 RX ORDER — SODIUM BICARBONATE 650 MG/1
650 TABLET ORAL 2 TIMES DAILY
Qty: 60 TABLET | Refills: 5 | Status: SHIPPED | OUTPATIENT
Start: 2017-10-18 | End: 2017-12-16

## 2017-10-20 PROBLEM — R26.9 ABNORMAL GAIT: Status: ACTIVE | Noted: 2017-10-20

## 2017-10-20 PROBLEM — R53.1 WEAKNESS GENERALIZED: Status: ACTIVE | Noted: 2017-10-20

## 2017-10-27 ENCOUNTER — HOSPITAL ENCOUNTER (EMERGENCY)
Age: 82
Discharge: HOME | End: 2017-10-27
Attending: EMERGENCY MEDICINE
Payer: MEDICARE

## 2017-10-27 ENCOUNTER — APPOINTMENT (OUTPATIENT)
Dept: GENERAL RADIOLOGY | Age: 82
End: 2017-10-27
Attending: EMERGENCY MEDICINE
Payer: MEDICARE

## 2017-10-27 VITALS
SYSTOLIC BLOOD PRESSURE: 119 MMHG | TEMPERATURE: 99 F | RESPIRATION RATE: 16 BRPM | HEART RATE: 75 BPM | DIASTOLIC BLOOD PRESSURE: 51 MMHG | BODY MASS INDEX: 19 KG/M2 | WEIGHT: 115 LBS | OXYGEN SATURATION: 100 %

## 2017-10-27 DIAGNOSIS — M25.571 ACUTE RIGHT ANKLE PAIN: Primary | ICD-10-CM

## 2017-10-27 PROCEDURE — 99283 EMERGENCY DEPT VISIT LOW MDM: CPT

## 2017-10-27 PROCEDURE — 73610 X-RAY EXAM OF ANKLE: CPT | Performed by: EMERGENCY MEDICINE

## 2017-10-27 NOTE — ED PROVIDER NOTES
Patient Seen in: [facility] Emergency Department In [place]    History   Patient presents with:  Lower Extremity Injury (musculoskeletal): Right ankle pain    Stated Complaint:     HPI    This is an 58-year-old female who presents with right ankle pain. [facility])        Past Surgical History:  No date: APPENDECTOMY  No date: APPENDECTOMY  No date: COLECTOMY  No date: COLONOSCOPY      Comment: 6/09 no dysplasia.   Repeat 2014 5/13/2014: COLONOSCOPY      Comment: Procedure: COLONOSCOPY;  Surgeon: Mercedes Hadley, Physical Exam    GENERAL: Awake, alert oriented x3, nontoxic appearing. SKIN: Normal, warm, and dry. HEENT:  Pupils equally round and reactive to light. Conjuctiva clear.   Oropharynx is clear and moist.   Lungs: Clear to auscultation bilaterally

## 2017-10-31 ENCOUNTER — SNF/IP PROF CHARGE ONLY (OUTPATIENT)
Dept: HEMATOLOGY/ONCOLOGY | Facility: HOSPITAL | Age: 82
End: 2017-10-31

## 2017-10-31 DIAGNOSIS — C20 RECTAL CANCER (HCC): ICD-10-CM

## 2017-10-31 PROCEDURE — G9678 ONCOLOGY CARE MODEL SERVICE: HCPCS | Performed by: INTERNAL MEDICINE

## 2017-11-01 ENCOUNTER — NURSE ONLY (OUTPATIENT)
Dept: HEMATOLOGY/ONCOLOGY | Facility: HOSPITAL | Age: 82
End: 2017-11-01
Attending: INTERNAL MEDICINE
Payer: MEDICARE

## 2017-11-01 PROCEDURE — 85025 COMPLETE CBC W/AUTO DIFF WBC: CPT | Performed by: INTERNAL MEDICINE

## 2017-11-01 PROCEDURE — 36415 COLL VENOUS BLD VENIPUNCTURE: CPT | Performed by: INTERNAL MEDICINE

## 2017-11-01 PROCEDURE — 36415 COLL VENOUS BLD VENIPUNCTURE: CPT

## 2017-11-01 PROCEDURE — 80053 COMPREHEN METABOLIC PANEL: CPT | Performed by: INTERNAL MEDICINE

## 2017-11-30 ENCOUNTER — OFFICE VISIT (OUTPATIENT)
Dept: HEMATOLOGY/ONCOLOGY | Age: 82
End: 2017-11-30
Attending: INTERNAL MEDICINE
Payer: MEDICARE

## 2017-11-30 ENCOUNTER — SNF/IP PROF CHARGE ONLY (OUTPATIENT)
Dept: HEMATOLOGY/ONCOLOGY | Facility: HOSPITAL | Age: 82
End: 2017-11-30

## 2017-11-30 VITALS
HEART RATE: 98 BPM | DIASTOLIC BLOOD PRESSURE: 70 MMHG | SYSTOLIC BLOOD PRESSURE: 150 MMHG | OXYGEN SATURATION: 91 % | WEIGHT: 121 LBS | BODY MASS INDEX: 20 KG/M2 | RESPIRATION RATE: 18 BRPM | TEMPERATURE: 96 F

## 2017-11-30 DIAGNOSIS — C20 RECTAL CANCER (HCC): ICD-10-CM

## 2017-11-30 DIAGNOSIS — D56.8 OTHER THALASSEMIA (HCC): ICD-10-CM

## 2017-11-30 DIAGNOSIS — D63.1 ANEMIA IN STAGE 3 CHRONIC KIDNEY DISEASE (HCC): Primary | ICD-10-CM

## 2017-11-30 DIAGNOSIS — N18.30 ANEMIA IN STAGE 3 CHRONIC KIDNEY DISEASE (HCC): Primary | ICD-10-CM

## 2017-11-30 PROCEDURE — G9678 ONCOLOGY CARE MODEL SERVICE: HCPCS | Performed by: INTERNAL MEDICINE

## 2017-11-30 PROCEDURE — 99214 OFFICE O/P EST MOD 30 MIN: CPT | Performed by: INTERNAL MEDICINE

## 2017-11-30 RX ORDER — FOLIC ACID 1 MG/1
1 TABLET ORAL DAILY
Status: ON HOLD | COMMUNITY
End: 2019-01-01

## 2017-11-30 NOTE — PROGRESS NOTES
Pt here for 4 month MD f/u. Energy level and appetite are good. Pt has arthritic pains. Pt doing PT and notices improvement. Pt has no further complaints.      Education Record    Learner:  Patient    Disease / Diagnosis:    Barriers / Limitations:  None

## 2017-12-01 NOTE — PROGRESS NOTES
[facility]    PATIENT'S NAME: Anastasia Barros [place] KOBE   ATTENDING PHYSICIAN: Jerod Herrera M.D.    PATIENT ACCOUNT #: [de-identified] LOCATION: [address] [place] [address] RECORD #: [id number] YOB: 1934   DATE OF SERVICE: 11/30/2017       CANCER p.r.n., benzonatate 200 mg t.i.d. p.r.n., Lomotil 1-2 tablets q.i.d. p.r.n., fluticasone propionate nasal spray b.i.d. p.r.n., folic acid 1 mg daily, loperamide 2-4 mg q.i.d. p.r.n., multivitamin daily, sodium bicarbonate 650 mg b.i.d., and triamcinolone t palliatively in the future, but there is certainly no indication for this at present. We will continue to follow her. I will see her again in early March after her CT scan.      Dictated By Lisa Desai M.D.  d: 11/30/2017 17:26:19  t: 11/30/2017 21:

## 2017-12-06 NOTE — PROGRESS NOTES
Telephone Information:  Home Phone      [phone]  Work Phone      Not on file.   Mobile          [phone] with patient, states she is on an important call and will call back

## 2017-12-08 ENCOUNTER — TELEPHONE (OUTPATIENT)
Dept: HEMATOLOGY/ONCOLOGY | Facility: HOSPITAL | Age: 82
End: 2017-12-08

## 2017-12-12 ENCOUNTER — APPOINTMENT (OUTPATIENT)
Dept: GENERAL RADIOLOGY | Age: 82
DRG: 563 | End: 2017-12-12
Attending: EMERGENCY MEDICINE
Payer: MEDICARE

## 2017-12-12 ENCOUNTER — HOSPITAL ENCOUNTER (EMERGENCY)
Age: 82
Discharge: HOME | DRG: 563 | End: 2017-12-12
Attending: EMERGENCY MEDICINE
Payer: MEDICARE

## 2017-12-12 ENCOUNTER — NURSE ONLY (OUTPATIENT)
Dept: NEPHROLOGY | Facility: CLINIC | Age: 82
End: 2017-12-12

## 2017-12-12 VITALS
WEIGHT: 125 LBS | HEART RATE: 64 BPM | OXYGEN SATURATION: 100 % | HEIGHT: 65 IN | DIASTOLIC BLOOD PRESSURE: 54 MMHG | TEMPERATURE: 97 F | RESPIRATION RATE: 16 BRPM | SYSTOLIC BLOOD PRESSURE: 129 MMHG | BODY MASS INDEX: 20.83 KG/M2

## 2017-12-12 VITALS — BODY MASS INDEX: 21 KG/M2 | WEIGHT: 123 LBS | SYSTOLIC BLOOD PRESSURE: 134 MMHG | DIASTOLIC BLOOD PRESSURE: 68 MMHG

## 2017-12-12 DIAGNOSIS — S39.013A STRAIN OF PELVIS, INITIAL ENCOUNTER: Primary | ICD-10-CM

## 2017-12-12 DIAGNOSIS — N18.4 ANEMIA IN STAGE 4 CHRONIC KIDNEY DISEASE (HCC): ICD-10-CM

## 2017-12-12 DIAGNOSIS — N18.4 CKD (CHRONIC KIDNEY DISEASE) STAGE 4, GFR 15-29 ML/MIN (HCC): ICD-10-CM

## 2017-12-12 DIAGNOSIS — D63.1 ANEMIA IN STAGE 4 CHRONIC KIDNEY DISEASE (HCC): ICD-10-CM

## 2017-12-12 PROCEDURE — 72190 X-RAY EXAM OF PELVIS: CPT | Performed by: EMERGENCY MEDICINE

## 2017-12-12 PROCEDURE — 96372 THER/PROPH/DIAG INJ SC/IM: CPT | Performed by: INTERNAL MEDICINE

## 2017-12-12 PROCEDURE — 99284 EMERGENCY DEPT VISIT MOD MDM: CPT

## 2017-12-12 PROCEDURE — 99283 EMERGENCY DEPT VISIT LOW MDM: CPT

## 2017-12-12 PROCEDURE — 73502 X-RAY EXAM HIP UNI 2-3 VIEWS: CPT | Performed by: EMERGENCY MEDICINE

## 2017-12-12 RX ORDER — HYDROCODONE BITARTRATE AND ACETAMINOPHEN 5; 325 MG/1; MG/1
1 TABLET ORAL ONCE
Status: COMPLETED | OUTPATIENT
Start: 2017-12-12 | End: 2017-12-12

## 2017-12-12 RX ORDER — HYDROCODONE BITARTRATE AND ACETAMINOPHEN 5; 325 MG/1; MG/1
1 TABLET ORAL EVERY 4 HOURS PRN
Qty: 20 TABLET | Refills: 0 | Status: ON HOLD | OUTPATIENT
Start: 2017-12-12 | End: 2017-12-16

## 2017-12-12 RX ORDER — ACETAMINOPHEN 500 MG
1000 TABLET ORAL EVERY 6 HOURS PRN
Status: ON HOLD | COMMUNITY
End: 2018-08-31

## 2017-12-12 NOTE — ED INITIAL ASSESSMENT (HPI)
Last night pt got up from couch and felt pain to right hip, today pain worse and painful to stand on it

## 2017-12-12 NOTE — ED PROVIDER NOTES
Patient Seen in: [facility] In [place]    History   Patient presents with:  Lower Extremity Injury (musculoskeletal)    Stated Complaint: right hip/pelvic area pain    HPI    80year-old with a history of Paget's disease of the vulva, S/P LASIK (laser assisted in situ keratomileusis) 7/2/2014   • [facility]'s [facility] ([facility]    • Stroke ([facility]     tia   • Thalassemia minor    • THALLASEMIA    • Unspecified essential hypertension    • Unspecified sleep apnea psg 8/5/13 TX 8-27-13    AHI 52/ s Conjunctivae within normal limits. Mucous members are moist.   Cardiovascular: Regular rate and rhythm, normal S1-S2. Respiratory: Lungs are clear to auscultation bilaterally. Abdomen: Soft, nontender, nondistended. Back: No CVA tenderness.    Extremit to try to go home with Norco which she has taken before. She understands that if she worsens she may need the above treatment plan.   She was also advised to return for new or worsening symptoms such as fever weakness numbness etc.  She understands and agr

## 2017-12-13 ENCOUNTER — APPOINTMENT (OUTPATIENT)
Dept: CT IMAGING | Facility: HOSPITAL | Age: 82
DRG: 563 | End: 2017-12-13
Attending: EMERGENCY MEDICINE
Payer: MEDICARE

## 2017-12-13 ENCOUNTER — APPOINTMENT (OUTPATIENT)
Dept: MRI IMAGING | Facility: HOSPITAL | Age: 82
DRG: 563 | End: 2017-12-13
Attending: INTERNAL MEDICINE
Payer: MEDICARE

## 2017-12-13 ENCOUNTER — HOSPITAL ENCOUNTER (INPATIENT)
Facility: HOSPITAL | Age: 82
LOS: 2 days | Discharge: HOME HEALTH | DRG: 563 | End: 2017-12-16
Attending: EMERGENCY MEDICINE | Admitting: INTERNAL MEDICINE
Payer: MEDICARE

## 2017-12-13 ENCOUNTER — APPOINTMENT (OUTPATIENT)
Dept: ULTRASOUND IMAGING | Facility: HOSPITAL | Age: 82
DRG: 563 | End: 2017-12-13
Attending: EMERGENCY MEDICINE
Payer: MEDICARE

## 2017-12-13 DIAGNOSIS — N18.4 ANEMIA IN STAGE 4 CHRONIC KIDNEY DISEASE (HCC): ICD-10-CM

## 2017-12-13 DIAGNOSIS — R26.2 INABILITY TO WALK: ICD-10-CM

## 2017-12-13 DIAGNOSIS — D63.1 ANEMIA IN STAGE 4 CHRONIC KIDNEY DISEASE (HCC): ICD-10-CM

## 2017-12-13 DIAGNOSIS — M25.551 PAIN OF RIGHT HIP JOINT: ICD-10-CM

## 2017-12-13 DIAGNOSIS — K62.89 RECTAL MASS: Primary | ICD-10-CM

## 2017-12-13 PROCEDURE — 99222 1ST HOSP IP/OBS MODERATE 55: CPT | Performed by: INTERNAL MEDICINE

## 2017-12-13 PROCEDURE — 93971 EXTREMITY STUDY: CPT | Performed by: EMERGENCY MEDICINE

## 2017-12-13 PROCEDURE — 72195 MRI PELVIS W/O DYE: CPT | Performed by: INTERNAL MEDICINE

## 2017-12-13 PROCEDURE — 72192 CT PELVIS W/O DYE: CPT | Performed by: EMERGENCY MEDICINE

## 2017-12-13 RX ORDER — HYDROMORPHONE HYDROCHLORIDE 1 MG/ML
0.8 INJECTION, SOLUTION INTRAMUSCULAR; INTRAVENOUS; SUBCUTANEOUS EVERY 2 HOUR PRN
Status: DISCONTINUED | OUTPATIENT
Start: 2017-12-13 | End: 2017-12-13

## 2017-12-13 RX ORDER — HEPARIN SODIUM 5000 [USP'U]/ML
5000 INJECTION, SOLUTION INTRAVENOUS; SUBCUTANEOUS EVERY 8 HOURS SCHEDULED
Status: DISCONTINUED | OUTPATIENT
Start: 2017-12-13 | End: 2017-12-16

## 2017-12-13 RX ORDER — HYDROMORPHONE HYDROCHLORIDE 1 MG/ML
0.2 INJECTION, SOLUTION INTRAMUSCULAR; INTRAVENOUS; SUBCUTANEOUS EVERY 2 HOUR PRN
Status: DISCONTINUED | OUTPATIENT
Start: 2017-12-13 | End: 2017-12-13

## 2017-12-13 RX ORDER — HYDROMORPHONE HYDROCHLORIDE 1 MG/ML
0.5 INJECTION, SOLUTION INTRAMUSCULAR; INTRAVENOUS; SUBCUTANEOUS EVERY 2 HOUR PRN
Status: DISCONTINUED | OUTPATIENT
Start: 2017-12-13 | End: 2017-12-16

## 2017-12-13 RX ORDER — LORAZEPAM 0.5 MG/1
0.5 TABLET ORAL
Status: DISCONTINUED | OUTPATIENT
Start: 2017-12-14 | End: 2017-12-13

## 2017-12-13 RX ORDER — HYDROCODONE BITARTRATE AND ACETAMINOPHEN 5; 325 MG/1; MG/1
1-2 TABLET ORAL EVERY 4 HOURS PRN
Status: DISCONTINUED | OUTPATIENT
Start: 2017-12-13 | End: 2017-12-16

## 2017-12-13 RX ORDER — ONDANSETRON 2 MG/ML
4 INJECTION INTRAMUSCULAR; INTRAVENOUS EVERY 6 HOURS PRN
Status: DISCONTINUED | OUTPATIENT
Start: 2017-12-13 | End: 2017-12-16

## 2017-12-13 RX ORDER — MAGNESIUM SULFATE HEPTAHYDRATE 40 MG/ML
2 INJECTION, SOLUTION INTRAVENOUS ONCE
Status: COMPLETED | OUTPATIENT
Start: 2017-12-13 | End: 2017-12-13

## 2017-12-13 RX ORDER — SODIUM BICARBONATE 325 MG/1
650 TABLET ORAL 2 TIMES DAILY
Status: DISCONTINUED | OUTPATIENT
Start: 2017-12-13 | End: 2017-12-15

## 2017-12-13 RX ORDER — MORPHINE SULFATE 4 MG/ML
2 INJECTION, SOLUTION INTRAMUSCULAR; INTRAVENOUS ONCE
Status: COMPLETED | OUTPATIENT
Start: 2017-12-13 | End: 2017-12-13

## 2017-12-13 RX ORDER — HYDROMORPHONE HYDROCHLORIDE 1 MG/ML
0.4 INJECTION, SOLUTION INTRAMUSCULAR; INTRAVENOUS; SUBCUTANEOUS EVERY 2 HOUR PRN
Status: DISCONTINUED | OUTPATIENT
Start: 2017-12-13 | End: 2017-12-13

## 2017-12-13 RX ORDER — HYDROMORPHONE HYDROCHLORIDE 1 MG/ML
1 INJECTION, SOLUTION INTRAMUSCULAR; INTRAVENOUS; SUBCUTANEOUS EVERY 2 HOUR PRN
Status: DISCONTINUED | OUTPATIENT
Start: 2017-12-13 | End: 2017-12-16

## 2017-12-13 RX ORDER — DIPHENOXYLATE HYDROCHLORIDE AND ATROPINE SULFATE 2.5; .025 MG/1; MG/1
1 TABLET ORAL 3 TIMES DAILY PRN
Status: DISCONTINUED | OUTPATIENT
Start: 2017-12-13 | End: 2017-12-16

## 2017-12-13 RX ORDER — SODIUM CHLORIDE 9 MG/ML
INJECTION, SOLUTION INTRAVENOUS CONTINUOUS
Status: DISCONTINUED | OUTPATIENT
Start: 2017-12-13 | End: 2017-12-13

## 2017-12-13 RX ORDER — METOCLOPRAMIDE HYDROCHLORIDE 5 MG/ML
5 INJECTION INTRAMUSCULAR; INTRAVENOUS EVERY 6 HOURS PRN
Status: DISCONTINUED | OUTPATIENT
Start: 2017-12-13 | End: 2017-12-16

## 2017-12-13 RX ORDER — LORAZEPAM 0.5 MG/1
0.5 TABLET ORAL ONCE
Status: DISCONTINUED | OUTPATIENT
Start: 2017-12-13 | End: 2017-12-16

## 2017-12-13 NOTE — CONSULTS
[facility]    PATIENT'S NAME: Lucas BULLOCK   ATTENDING PHYSICIAN: [facility] [place]: Tracy Villalta M.D.    PATIENT ACCOUNT#:   [de-identified]    LOCATION:  [address] [place] [address] [facility]  MEDICAL RECORD #:   [id number]       DATE OF BIR for a followup visit and she was doing quite well. She was independent. She was having no real issues with pelvic pain. She does have several other problems. She has chronic kidney disease and is followed by Dr. Alysha Davis.   She has anemia that is multifacto disease, history of chronic kidney disease, history of thalassemia trait, history of Sjogren syndrome, Crohn disease, fibromyalgia, vulvar Paget disease status post vulvectomy, macular degeneration, perirectal abscesses on multiple occasions, TIA on 1 occa has pink conjunctivae, anicteric sclerae. Pharynx is without lesions. LYMPHATICS:  No cervical, supraclavicular, or axillary adenopathy. LUNGS:  Resonant to percussion and clear to auscultation. No wheezing, rales, or rhonchi.    HEART:  Regular S1 and low.  Her ferritin will not be particularly informative given the potential inflammatory state of her cancer, and as a result, I do think it is reasonable to treat her with some Venofer while she is here; I ordered 3 doses over the next 3 days.   5.   Dispo

## 2017-12-13 NOTE — CM/SW NOTE
12/13/17 1400   CM/SW Screening   Referral Source    Information Source Chart review;Nursing rounds  (patient)   Patient's Mental Status Alert;Oriented   Patient's [address]   Number of Levels in Home 2   Patient lives with Mariposa Kurtz

## 2017-12-13 NOTE — PLAN OF CARE
Resting in bed at this time. ABle to make needs known. Dilaudid 0.2mg for c/o L groin pain. UNable to move L leg without pain. Bed pan offered for bladder needs. ILeostomy care needs rendered. MRI pending due to tight schedule. Will be done this PM per MRI,like

## 2017-12-13 NOTE — ED NOTES
PT BACK FROM US AND CT AND STATES THAT SHE FEELS THE PAIN PILL IS WEARING OFF. I ASKED PT FOR A PAIN SCORE AND SHE SAID 0/10.  PT STATES THAT SHE HAD THE NAUSEA, TREMORS AND NECK PAIN FROM THE PAIN PILL SHE TOOK AT 2000 AND THOSE SX ARE RESOLVING AND SHE IS

## 2017-12-13 NOTE — PLAN OF CARE
Primary made aware of abnormal electrolyte results. Mag 1.0 & potassium 5. 5. With orders to   consult nephrology. dr Anusha Mathew notified. Will see patient & will manage abnormal electrolytes.

## 2017-12-13 NOTE — ED INITIAL ASSESSMENT (HPI)
PT WAS SEEN IN PED LAST NIGHT FOR PAIN TO R GROIN. PT WAS TOLD SHE MAY HAVE A HAIRLINE FX OF R HIP/PELVIS. DX PELVIC STRAIN. PT STATES SHE IS NOT ABLE TO TAKE CARE OF HERSELF AND WANTS ASSISTANCE.

## 2017-12-13 NOTE — H&P
[facility] hospitalist H+P  PCP;Kristina Marroquin MD  CC pain  HPI 79 yo female with multiple medical problems including but not limited to hx of GERD, Crohn's, HTN,  MONSERRAT, rectl cancer  Came with right hip/groin pain.  Per patient the pain is severe, sharp, 10/10, cri ILEOSTOMY/JEJUNOSTOMY,NONTUBE  No date: OTHER SURGICAL HISTORY      Comment: colon resection x 2  2003: OTHER SURGICAL HISTORY      Comment: vulvectomy per Dr. Leena Lopez  No date: OTHER SURGICAL HISTORY      Comment: perirectal abcesses  10-07: OTHER SURGICAL Re , lives in [address] [place] [address].  has lung cancer, done with treatment for now. He is getting dementia. She drives herself most days, and is independent for the most part.           All:  Aspirin Cr [Aspirin]    Bleeding  Ciprofloxacin Oral Cap Take 2 mg by mouth as needed for Diarrhea.  Disp:  Rfl:      ROS 10 systems reviewed and negative except as in HPI  PE;   12/13/17  0445   BP: 146/54   Pulse: 91   Resp: 18   Temp: 97.7 °F (36.5 °C)     Gen: awake, alert, no respiratory distress  H

## 2017-12-13 NOTE — PROGRESS NOTES
Full consult dictated. Pt well known to me. Long history of inflammatory bowel disease with resultant perirectal abscesses and fistulas.   Has had mucinous adenocarcinoma diagnosed in the wall of a large pelvic perirectal cavity within the last year and t yesterday in Dr Sarah Gr office.     Nathan Harley MD

## 2017-12-13 NOTE — PROGRESS NOTES
2    NURSING ADMISSION NOTE      Patient admitted via Cart from ER due to right hip and groin pain. Oriented to room. Safety precautions initiated. Bed in low position. Call light in reach. Plan of care reviewed with the patient, pain med given.  Henri

## 2017-12-13 NOTE — ED PROVIDER NOTES
Patient Seen in: [facility] Emergency Department    History   Patient presents with:   Other    Stated Complaint: R GROIN HAIRLINE FX    HPI    27-year-old with a history of Paget's disease of the vulva, Crohn's disease, peripheral vascular disease, Insomnia    • KIDNEY STONE    • Macular degeneration    • Migraines     as a child   • Osteoarthrosis, unspecified whether generalized or localized, unspecified site    • OSTEOPENIA    • Paget's disease     Vulva   • Per[facility] vascular [facility] ([facility]    • Vitals [12/13/17 0156]  BP: 144/70  Pulse: 88  Resp: 16  Temp: 97.7 °F (36.5 °C)  Temp src: Temporal  SpO2: 99 %  O2 Device: None (Room air)    Current:/55   Pulse 89   Temp 97.7 °F (36.5 °C) (Temporal)   Resp 18   Ht 165.1 cm (5' 5\")   Wt 54.4 kg CBC WITH DIFFERENTIAL WITH PLATELET.   Procedure                               Abnormality         Status                     ---------                               -----------         ------                     CBC W/ DIFFERENTIAL[[id number]] She is anemic, with chronic renal insufficiency. Ultrasound is negative for DVT. CT shows what appears to be a stable rectal mass. No occult fracture per discussion with radiology.   However she will need be admitted for pain control, and possible subacu

## 2017-12-14 PROCEDURE — 99232 SBSQ HOSP IP/OBS MODERATE 35: CPT | Performed by: INTERNAL MEDICINE

## 2017-12-14 PROCEDURE — 30233N1 TRANSFUSION OF NONAUTOLOGOUS RED BLOOD CELLS INTO PERIPHERAL VEIN, PERCUTANEOUS APPROACH: ICD-10-PCS | Performed by: HOSPITALIST

## 2017-12-14 PROCEDURE — 05H533Z INSERTION OF INFUSION DEVICE INTO RIGHT SUBCLAVIAN VEIN, PERCUTANEOUS APPROACH: ICD-10-PCS | Performed by: HOSPITALIST

## 2017-12-14 RX ORDER — SODIUM CHLORIDE 0.9 % (FLUSH) 0.9 %
10 SYRINGE (ML) INJECTION EVERY 12 HOURS
Status: DISCONTINUED | OUTPATIENT
Start: 2017-12-14 | End: 2017-12-16

## 2017-12-14 RX ORDER — SODIUM CHLORIDE 9 MG/ML
INJECTION, SOLUTION INTRAVENOUS ONCE
Status: COMPLETED | OUTPATIENT
Start: 2017-12-14 | End: 2017-12-14

## 2017-12-14 RX ORDER — VIT A/VIT C/VIT E/ZINC/COPPER 2148-113
2 TABLET ORAL DAILY
COMMUNITY
End: 2019-01-09

## 2017-12-14 NOTE — PLAN OF CARE
Called MRI to follow up patient's schedule. Rn was told patient still has to wait another hour or so. Patient updated re this.

## 2017-12-14 NOTE — PLAN OF CARE
IV pump kept on beeping,IV infusing well & Iv site flushing well. Arm repositioned  Multiple times to stop IV from beeping. RN offered to place new IV access site but patient declined.

## 2017-12-14 NOTE — PROGRESS NOTES
A&O times 4. MRI of pelvis done. Complaints of right groin pain, norco given per mar with some relief. VSS and afebrile. ileostomy appliance changed, skin is intact, no redness noted. Ordered received from nephrology and carried out.   Up with assist to B

## 2017-12-14 NOTE — PROGRESS NOTES
[facility] Pharmacy Note:  Renal Dose Adjustment for Metoclopramide (REGLAN)    Daysi OKBE Cohen has been prescribed Metoclopramide (REGLAN) 10 mg every 6 hours as needed for nausea/vomiting.     Estimated Creatinine Clearance: 11.4 mL/min (based on SCr of 3.32

## 2017-12-14 NOTE — PROGRESS NOTES
Pt AOx4, anxious at times. Resting in bed and recliner. Ambulating with walker in room. Ostomy changed d/t leakage. VSS. Hgb 6.6 this AM, endorsed to oncologist--orders to give the scheduled IV venofer and recheck hemoglobin at 1600.  No orders for transfus

## 2017-12-14 NOTE — PROGRESS NOTES
[facility]  Nephrology Progress Note    Norm Brink Attending:  Jacquie Seymour MD       Assessment and Plan:    1) TANIA- due to mild volume depletion with chronic modest PO intake + high output ostomy; no other acute insults. Improving.  PLAN- c 163.0 12/14/2017   CREATSERUM 2.83 12/14/2017   BUN 37 12/14/2017    12/14/2017   K 4.6 12/14/2017    12/14/2017   CO2 18.0 12/14/2017   GLU 92 12/14/2017   CA 9.3 12/14/2017   MG 1.8 12/14/2017   PHOS 3.1 12/14/2017       Imaging:   All imaging

## 2017-12-14 NOTE — OCCUPATIONAL THERAPY NOTE
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 Pt performed functional mobility with supervision assistance and RW x approx 2ft>chair. Pt noted to not bear full weight through R LE due to pain. Pt was educated on safety precautions.  Pt was left in her chair with [facility] staff present, with Norm Cortés Recommendations: Home with home health PT/OT  OT Device Recommendations: TBD    PLAN  OT Treatment Plan: Balance activities; Energy conservation/work simplification techniques;ADL training;Functional transfer training; Endurance training;Patient/Family educa

## 2017-12-14 NOTE — CM/SW NOTE
SW followed up w/pt regarding AI recommendations. Pt stated, \"Im going home now. \" Per pt and RN, the pt is doing much better. Pt stating she does not want rehab anymore and would like SW to come back tomorrow to discuss the dc plan.  Pt has had Raul Garzon

## 2017-12-14 NOTE — PHYSICAL THERAPY NOTE
PHYSICAL THERAPY EVALUATION - INPATIENT     Room Number: 426/426-A  Evaluation Date: 12/14/2017  Type of Evaluation: Initial  Physician Order: PT Eval and Treat    Presenting Problem: R groin pain  Reason for Therapy: Mobility Dysfunction and Discharge COLECTOMY  No date: COLONOSCOPY      Comment: 6/09 no dysplasia.   Repeat 2014 5/13/2014: COLONOSCOPY      Comment: Procedure: COLONOSCOPY;  Surgeon: Amanda Guzman MD;  Location: [address] [place] [address] ENDOSCOPY  No date: D & C  No date: ILEOSTOMY/JEJUNOSTOM BASIC MOBILITY  How much difficulty does the patient currently have. ..  -   Turning over in bed (including adjusting bedclothes, sheets and blankets)?: A Little   -   Sitting down on and standing up from a chair with arms (e.g., wheelchair, bedside commode tolerance and pain. Functional outcome measures completed include AMPAC. Based on this evaluation, patient's clinical presentation is evolving and overall the evaluation complexity is considered moderate.   These impairments and comorbidities manifest the

## 2017-12-14 NOTE — CONSULTS
[facility]  Report of Consultation    Santo Calzada Patient Status:  Observation    1934 MRN [id number]   [facility] 4NW-A Attending Kat Rodrigues MD   Hosp Day # 0 PCP Kristy Mejía MD       Assessment / Plan:    1) TANIA- d Stroke ([facility]     tia   • Thalassemia minor    • THALLASEMIA    • Unspecified essential hypertension    • Unspecified sleep apnea psg 8/5/13 [place] 8-27-13    AHI 52/ supine 101/ sao2 88%  CPAP 11 [facility]   • Visual impairment    • Vulvar cancer (HCC)      Past diphenoxylate-atropine (LOMOTIL) 2.5-0.025 MG per tab 1 tablet, 1 tablet, Oral, TID PRN  •  HYDROcodone-acetaminophen (NORCO) 5-325 MG per tab 1-2 tablet, 1-2 tablet, Oral, Q4H PRN  •  sodium bicarbonate tab 650 mg, 650 mg, Oral, BID  •  Heparin Sodium (Po extremities  Skin: Warm and dry, no rashes      Laboratory Data:    Lab Results  Component Value Date   WBC 7.6 12/13/2017   HGB 7.2 12/13/2017   HCT 22.2 12/13/2017   .0 12/13/2017   CREATSERUM 3.32 12/13/2017   BUN 51 12/13/2017    12/13/201

## 2017-12-14 NOTE — CERTIFICATION
**Certification    PHYSICIAN Certification of Need for Inpatient Hospitalization    Based on the her current state of illness, Prasanna De León requires inpatient hospitalization for her pain, cancer

## 2017-12-14 NOTE — PROGRESS NOTES
Heme/Onc Progress Note    Patient Name: Yossi Gaxiola   YOB: 1934   Medical Record Number: [id number]   CSN: [id number]   Attending Physician: Jakub Vences M.D. Subjective:  Pain is slightly better.   Still feels she can't bear w Right arm)   Pulse 93   Temp 98 °F (36.7 °C) (Oral)   Resp 18   Ht 1.651 m (5' 5\")   Wt 56.3 kg (124 lb 1.9 oz)   SpO2 97%   BMI 20.65 kg/m²   HEENT: EOMs intact. PERRL. Oropharynx is clear. Neck: No JVD. No palpable lymphadenopathy. Neck is supple.   Ch externus muscle as well as the adductor brevis muscle consistent with strain and/or partial tear. OTHER:  Right lower quadrant ileostomy.   Small, 1.9 x 1.2 x 1.4 cm cystic focus just medial to the right gluteus olga muscle.      =====  CONCLUSION: orthopedic evaluation and then likely subacute rehab.       MD [facility]  [facility], [facility]

## 2017-12-14 NOTE — PLAN OF CARE
Nephrologist seen on the unit,reminded MD of abnormal electrolytes. MD replied he will take care of it.

## 2017-12-14 NOTE — PROGRESS NOTES
[facility]  Progress Note    Gonzalez Rhoades Patient Status:  Inpatient    1934 MRN [id number]   [facility] 4NW-A Attending Isidra Marley MD   Hosp Day # 0 PCP Carline Barnes MD     Cc: follow up    Subjective:      Ms. Shweta Bower with PMH significant for GERD, Crohn's disease, multiple fistulas, rectal cancer, CKD, chronic anemia, chronic metabolic acidosis, and MONSERRAT presenting with right hip pain.      # Right hip / groin pain  - MRI pelvis with known pelvic mass, concern for muscul

## 2017-12-15 PROBLEM — D50.0 IRON DEFICIENCY ANEMIA DUE TO CHRONIC BLOOD LOSS: Status: ACTIVE | Noted: 2017-12-15

## 2017-12-15 PROCEDURE — 99232 SBSQ HOSP IP/OBS MODERATE 35: CPT | Performed by: INTERNAL MEDICINE

## 2017-12-15 RX ORDER — SODIUM BICARBONATE 650 MG/1
1300 TABLET ORAL 2 TIMES DAILY
Qty: 120 TABLET | Refills: 3 | Status: SHIPPED | OUTPATIENT
Start: 2017-12-15 | End: 2017-12-16

## 2017-12-15 RX ORDER — SODIUM BICARBONATE 325 MG/1
1300 TABLET ORAL 2 TIMES DAILY
Status: DISCONTINUED | OUTPATIENT
Start: 2017-12-15 | End: 2017-12-16

## 2017-12-15 NOTE — CM/SW NOTE
SW followed up w/pt regarding PT recommendations. Pt agreeable to [place] and would like [facility] since she has had them in the past. SW sent referral via [facility].

## 2017-12-15 NOTE — PROGRESS NOTES
Heme/Onc Progress Note    Patient Name: Jose L Jacobsen   YOB: 1934   Medical Record Number: [id number]   CSN: [id number]   Attending Physician: Tyler Williamson M.D. Subjective:  Gradually feeling better.   Now planning to go home rat •  Darbepoetin Orlando (ARANESP) 300 MCG/0.6ML injection 300 mcg, 300 mcg, Subcutaneous, Q30 Days    Physical Examination:  General: Patient is alert and oriented x 3, not in acute distress.   Vital Signs: /58 (BP Location: Left arm)   Pulse 74   Temp mmol/L   -MAGNESIUM   Collection Time: 12/15/17  6:27 AM   Result Value Ref Range   Magnesium 1.5 (L) 1.7 - 3.0 mg/dL   -PHOSPHORUS   Collection Time: 12/15/17  6:27 AM   Result Value Ref Range   Phosphorus 2.9 2.5 - 4.9 mg/dL   -CBC W/ DIFFERENTIAL   Jorge fact she also has had recurrent iron deficiency anemia.  Her iron saturation now is 10%, which is low.  Her ferritin will not be particularly informative given the potential inflammatory state of her cancer, and as a result, I do think it is reasonable to

## 2017-12-15 NOTE — PROGRESS NOTES
Pt AOx4. No c/o pain or need for pain medication during shift. Up to bathroom with assist using walker. Venofer administered. Hgb 7.4 this AM after having 1u PRBCs last night 12/14. [id number] Maya Neil for D/C per oncology and nephrology. Probable discharge tomorrow.  IVF d

## 2017-12-15 NOTE — PROGRESS NOTES
[facility]  Nephrology Progress Note    Yossi Gaxiola Attending:  July Toussaint MD       Assessment and Plan:    1) TANIA- due to mild volume depletion with chronic modest PO intake + high output ostomy; no other acute insults. Improving.  Appears HCT 22.7 12/15/2017   .0 12/15/2017   CREATSERUM 2.60 12/15/2017   BUN 34 12/15/2017    12/15/2017   K 4.8 12/15/2017    12/15/2017   CO2 22.0 12/15/2017   GLU 95 12/15/2017   CA 9.0 12/15/2017   MG 1.5 12/15/2017   PHOS 2.9 12/15/2017

## 2017-12-15 NOTE — PLAN OF CARE
CARDIOVASCULAR - ADULT    • Maintains optimal cardiac output and hemodynamic stability Progressing        Impaired Activities of Daily Living    • Achieve highest/safest level of independence in self care Progressing        Impaired Functional Mobility

## 2017-12-15 NOTE — PROGRESS NOTES
[facility]  Progress Note    Ruma Oseguera Patient Status:  Inpatient    1934 MRN [id number]   [facility] 4NW-A Attending Torin Zepeda MD   Hosp Day # 1 PCP Tata Quispe MD     Cc: follow up    Subjective:      Ms. Davin Rhodes --    ALB  3.4*   --    LDH   --   132       No results for input(s): PGLU in the last 72 hours.     Imaging:  No new            Meds:   • Normal Saline Flush  10 mL Intravenous Q12H   • sodium bicarbonate  650 mg Oral BID   • Heparin Sodium (Porcine)  5, Venofer. Oncology following, appreciate input  Right hip/groin pain; no fracture. Has right ischial rectal fossa mass (followed by oncology) Suspect that muscle strain or tear may be contributing to patient's symptoms.  Pain medication ordered with improvem

## 2017-12-16 VITALS
OXYGEN SATURATION: 98 % | BODY MASS INDEX: 20.2 KG/M2 | WEIGHT: 121.25 LBS | HEIGHT: 65 IN | HEART RATE: 85 BPM | RESPIRATION RATE: 18 BRPM | TEMPERATURE: 99 F | DIASTOLIC BLOOD PRESSURE: 61 MMHG | SYSTOLIC BLOOD PRESSURE: 127 MMHG

## 2017-12-16 PROCEDURE — 99232 SBSQ HOSP IP/OBS MODERATE 35: CPT | Performed by: INTERNAL MEDICINE

## 2017-12-16 RX ORDER — SODIUM BICARBONATE 650 MG/1
1300 TABLET ORAL 2 TIMES DAILY
Qty: 120 TABLET | Refills: 3 | Status: ON HOLD | OUTPATIENT
Start: 2017-12-16 | End: 2018-08-31

## 2017-12-16 RX ORDER — HYDROCODONE BITARTRATE AND ACETAMINOPHEN 5; 325 MG/1; MG/1
1 TABLET ORAL EVERY 4 HOURS PRN
Qty: 10 TABLET | Refills: 0 | Status: SHIPPED | OUTPATIENT
Start: 2017-12-16 | End: 2018-03-29

## 2017-12-16 NOTE — DISCHARGE SUMMARY
General Medicine Discharge Summary     Patient ID:  Medardo Wilhelm  80year old  [id number]  7/28/1934    Admit date: 12/13/2017    Discharge date and time: 12/16/17    Attending Physician: Wyatt Mariano, *     Primary Care Physician: Matilde Zavala used     # Rectal cancer  - oncology consult appreciated     # TANIA on CKD         - renal consult appreciated     # Chronic metabolic acidosis  - sodium bicarb tabs and s/p IV bicarb gtt per renal     # Anemia  - IV venofer x 3 days - completed  - Epo per Rene Lui MD            Us Venous Doppler Leg Right - Diag Img (cpt=93971)    Result Date: 12/13/2017  PROCEDURE:  ULTRASOUND VENOUS BLOOD FLOW OF THE RIGHT LEG  COMPARISON:  None. INDICATIONS:  Right lower extremity pain.   TECHNIQUE:  Real time, If clinical symptoms persist consider cross-sectional imaging.     Dictated by: Joice Curling, MD on 12/12/2017 at 17:55     Approved by: Joice Curling, MD            Mri Pelvis (bony)(cbu=30446)    Result Date: 12/13/2017  PROCEDURE:  MRI PELVIS (BONY)(CPT=72195) MD on 12/13/2017 at 21:16     Approved by: Tamar Brown MD            Xr Hip W Or Wo Pelvis 2 Or 3 Views, Right (cpt=73502)    Result Date: 12/12/2017  PROCEDURE:  XR HIP W OR WO PELVIS 2 OR 3 VIEWS, RIGHT ( CPT=73502)  TECHNIQUE:  Unilateral 2 to 3 views of mouth once a week. triamcinolone acetonide 0.1 % External Cream  Apply 1 Application topically 2 (two) times daily. diphenoxylate-atropine 2.5-0.025 MG Oral Tab  Take 1 tablet by mouth 3 (three) times daily as needed for Diarrhea.     Loperamide HCl 2

## 2017-12-16 NOTE — PLAN OF CARE
Pt is alert and oriented x 4. Vitals stable. No C/o pain or discomfort at rest, pain only on movement. Narco given before discharge. No Diarrhea. Discharge instructions provided. Pt verbalized understanding. Prescription given to pt.  Educated on Fall precau

## 2017-12-16 NOTE — PROGRESS NOTES
[facility]  Nephrology Progress Note    Shani Daniels Attending:  Montse Nicolas MD       Assessment and Plan:    1) TANIA- due to mild volume depletion with chronic modest PO intake + high output ostomy; no other acute insults. Improving.  Appears HGB 7.5 12/16/2017   HCT 22.8 12/16/2017   .0 12/16/2017       Imaging: All imaging studies reviewed.     Meds:     Current Facility-Administered Medications:  sodium bicarbonate tab 1,300 mg 1,300 mg Oral BID   Normal Saline Flush 0.9 % injection

## 2017-12-16 NOTE — CM/SW NOTE
12/16/17 1300   Discharge disposition   Discharged to: Home-Health   Name of Juan M Denis notified of dc and avs sent via ecin.   Elayne Velasquez, 12/16/17, 1:47 PM

## 2017-12-16 NOTE — PROGRESS NOTES
Heme/Onc Progress Note    Patient Name: Rik Mukherjee   YOB: 1934   Medical Record Number: [id number]   CSN: [id number]   Attending Physician: Hussein Sloan M.D. Subjective:  Feels good. Ate well.        Objective:    Vitals:   12 intact. PERRL. Oropharynx is clear. Neck: No JVD. No palpable lymphadenopathy. Neck is supple. Chest: Clear to auscultation. Heart: Regular rate and rhythm. Abdomen: Soft, non tender with good bowel sounds. Extremities: Pedal pulses are present.  No deficiency anemia.  S/p IV iron. Hgb stable. 5.       Disposition. Shahida Almeida is more able to care for herself and move around. Shahida Almeida is going to need home physical therapy and nursing. Ok to d/c from onc standpoint.  She has f/u already scheduled with

## 2017-12-21 ENCOUNTER — TELEPHONE (OUTPATIENT)
Dept: HEMATOLOGY/ONCOLOGY | Facility: HOSPITAL | Age: 82
End: 2017-12-21

## 2017-12-21 NOTE — TELEPHONE ENCOUNTER
Jaya Srivastava requiring progress notes from MD.  Last office note (11/30/17) and last hospital note from Dr Elidia Sparks faxed to [phone].

## 2017-12-22 ENCOUNTER — SNF ADMIT/H&P (OUTPATIENT)
Dept: FAMILY MEDICINE CLINIC | Facility: CLINIC | Age: 82
End: 2017-12-22

## 2017-12-22 ENCOUNTER — NURSE ONLY (OUTPATIENT)
Dept: LAB | Age: 82
End: 2017-12-22
Attending: NURSE PRACTITIONER
Payer: MEDICARE

## 2017-12-22 VITALS
DIASTOLIC BLOOD PRESSURE: 57 MMHG | OXYGEN SATURATION: 97 % | TEMPERATURE: 97 F | RESPIRATION RATE: 18 BRPM | SYSTOLIC BLOOD PRESSURE: 115 MMHG | HEART RATE: 74 BPM

## 2017-12-22 DIAGNOSIS — M25.551 PAIN OF RIGHT HIP JOINT: Primary | ICD-10-CM

## 2017-12-22 DIAGNOSIS — D63.1 ANEMIA IN STAGE 4 CHRONIC KIDNEY DISEASE (HCC): ICD-10-CM

## 2017-12-22 DIAGNOSIS — N18.4 ANEMIA IN STAGE 4 CHRONIC KIDNEY DISEASE (HCC): ICD-10-CM

## 2017-12-22 DIAGNOSIS — R53.81 PHYSICAL DECONDITIONING: ICD-10-CM

## 2017-12-22 DIAGNOSIS — I10 HTN (HYPERTENSION): Primary | ICD-10-CM

## 2017-12-22 DIAGNOSIS — K50.819 CROHN'S DISEASE OF SMALL AND LARGE INTESTINES WITH COMPLICATION (HCC): ICD-10-CM

## 2017-12-22 DIAGNOSIS — N18.4 CKD (CHRONIC KIDNEY DISEASE) STAGE 4, GFR 15-29 ML/MIN (HCC): ICD-10-CM

## 2017-12-22 DIAGNOSIS — E56.9 VITAMIN DISEASE: ICD-10-CM

## 2017-12-22 DIAGNOSIS — E55.9 VITAMIN D DEFICIENCY: ICD-10-CM

## 2017-12-22 DIAGNOSIS — C20 RECTAL CANCER (HCC): ICD-10-CM

## 2017-12-22 PROCEDURE — 84100 ASSAY OF PHOSPHORUS: CPT

## 2017-12-22 PROCEDURE — 99306 1ST NF CARE HIGH MDM 50: CPT | Performed by: FAMILY MEDICINE

## 2017-12-22 PROCEDURE — 83735 ASSAY OF MAGNESIUM: CPT

## 2017-12-22 PROCEDURE — 85027 COMPLETE CBC AUTOMATED: CPT

## 2017-12-22 PROCEDURE — 82306 VITAMIN D 25 HYDROXY: CPT

## 2017-12-22 PROCEDURE — 80053 COMPREHEN METABOLIC PANEL: CPT

## 2017-12-22 NOTE — H&P
ED [facility], [address] [place] [address]    History and Physical    Fareed Finnegan Patient Status:  No patient class for patient encounter    1934 MRN [id number]   Location [facility] , [address] [place] [address] Attending No at Migraines     as a child   • Osteoarthrosis, unspecified whether generalized or localized, unspecified site    • OSTEOPENIA    • Paget's disease     Vulva   • Peripheral vascular disease ([facility]    • Perirectal abscess    • Pneumonia, organism unspecified(48 1/19/1975  Smokeless tobacco: Never Used                      Alcohol use: Yes           0.0 oz/week     Comment: 1 glass of wine a month    Allergies/Medications:    Allergies:   Aspirin Cr [Aspirin]    Bleeding  Ciprofloxacin               Comment:Itching normal. Judgment and thought content normal.         Results:     Lab Results  Component Value Date   WBC 7.5 12/16/2017   HGB 7.5 (L) 12/16/2017   HCT 22.8 (L) 12/16/2017   .0 12/16/2017   CREATSERUM 2.60 (H) 12/15/2017   BUN 34 (H) 12/15/2017   NA Vitamins-Minerals (MULTIVITAMIN ADULTS OR), Take by mouth daily. , Disp: , Rfl:   •  folic acid 1 MG Oral Tab, Take by mouth daily as needed. , Disp: , Rfl:   •  Fluticasone Propionate 50 MCG/ACT Nasal Suspension, 1 spray by Each Nare route 2 (two) times zohaib

## 2017-12-23 ENCOUNTER — NURSE ONLY (OUTPATIENT)
Dept: LAB | Age: 82
End: 2017-12-23
Attending: FAMILY MEDICINE
Payer: MEDICARE

## 2017-12-23 DIAGNOSIS — E87.5 HIGH POTASSIUM: Primary | ICD-10-CM

## 2017-12-23 PROCEDURE — 80053 COMPREHEN METABOLIC PANEL: CPT

## 2017-12-24 ENCOUNTER — NURSE ONLY (OUTPATIENT)
Dept: LAB | Age: 82
End: 2017-12-24
Attending: FAMILY MEDICINE
Payer: MEDICARE

## 2017-12-24 DIAGNOSIS — E87.5 HYPERKALEMIA: Primary | ICD-10-CM

## 2017-12-24 PROCEDURE — 80053 COMPREHEN METABOLIC PANEL: CPT

## 2017-12-26 ENCOUNTER — LAB ENCOUNTER (OUTPATIENT)
Dept: LAB | Age: 82
End: 2017-12-26
Attending: NURSE PRACTITIONER
Payer: MEDICARE

## 2017-12-26 DIAGNOSIS — E56.9 VITAMIN DEFICIENCY: Primary | ICD-10-CM

## 2017-12-26 PROCEDURE — 80053 COMPREHEN METABOLIC PANEL: CPT

## 2017-12-27 ENCOUNTER — SNF VISIT (OUTPATIENT)
Dept: INTERNAL MEDICINE CLINIC | Age: 82
End: 2017-12-27

## 2017-12-27 ENCOUNTER — LAB ENCOUNTER (OUTPATIENT)
Dept: LAB | Age: 82
End: 2017-12-27
Attending: NURSE PRACTITIONER
Payer: MEDICARE

## 2017-12-27 VITALS
RESPIRATION RATE: 20 BRPM | SYSTOLIC BLOOD PRESSURE: 116 MMHG | HEART RATE: 71 BPM | OXYGEN SATURATION: 98 % | TEMPERATURE: 97 F | DIASTOLIC BLOOD PRESSURE: 48 MMHG

## 2017-12-27 DIAGNOSIS — E55.9 VITAMIN D DEFICIENCY: ICD-10-CM

## 2017-12-27 DIAGNOSIS — N18.4 CKD (CHRONIC KIDNEY DISEASE) STAGE 4, GFR 15-29 ML/MIN (HCC): ICD-10-CM

## 2017-12-27 DIAGNOSIS — E87.2 METABOLIC ACIDOSIS: ICD-10-CM

## 2017-12-27 DIAGNOSIS — R53.1 WEAKNESS GENERALIZED: ICD-10-CM

## 2017-12-27 DIAGNOSIS — N17.9 ACUTE RENAL FAILURE, UNSPECIFIED ACUTE RENAL FAILURE TYPE (HCC): ICD-10-CM

## 2017-12-27 DIAGNOSIS — E87.5 HYPERPOTASSEMIA: Primary | ICD-10-CM

## 2017-12-27 DIAGNOSIS — R19.8 HIGH OUTPUT ILEOSTOMY (HCC): ICD-10-CM

## 2017-12-27 DIAGNOSIS — Z93.2 HIGH OUTPUT ILEOSTOMY (HCC): ICD-10-CM

## 2017-12-27 DIAGNOSIS — E86.0 DEHYDRATION: ICD-10-CM

## 2017-12-27 PROCEDURE — 99310 SBSQ NF CARE HIGH MDM 45: CPT | Performed by: NURSE PRACTITIONER

## 2017-12-27 PROCEDURE — 80048 BASIC METABOLIC PNL TOTAL CA: CPT

## 2017-12-27 NOTE — PROGRESS NOTES
Rhea Pasquale  : 1934  Age 80year old  female patient is admitted to [facility] for rehabilitation due to TANIA, chronic metabolic acidosis, and right hip/groin pain.      [address] [place] [address] Drive date:  2017  Discharge • IBS (irritable bowel syndrome)    • Insomnia    • KIDNEY STONE    • Macular degeneration    • Migraines     as a child   • Osteoarthrosis, unspecified whether generalized or localized, unspecified site    • OSTEOPENIA    • Paget's disease     Vulva   • Packs/day: 1.00      Years: 25.00        Types: Cigarettes     Quit date: 1/19/1975  Smokeless tobacco: Never Used                      Alcohol use: Yes           0.0 oz/week     Comment: 1 glass of wine a month      ALLERGIES:    Aspirin Cr [Aspirin]    B SpO2 98%      REVIEW OF SYSTEMS:  GENERAL HEALTH:feels well otherwise  SKIN: --- denies skin issues   WOUNDS: none   EYES:---with correction  HENT: hearing loss negative and states she has a cough and PND   RESPIRATORY: ---denies shortness of breath   CARD 12/27/2017   ALKPHO 120 12/26/2017   AST 14 (L) 12/26/2017   ALT 19 12/26/2017   BILT 1.1 12/26/2017   TP 6.9 12/26/2017   ALB 3.1 (L) 12/26/2017   AGRATIO 1.4 03/21/2016    12/27/2017   K 5.0 12/27/2017    12/27/2017   CO2 17.0 (L) 12/27/2017 supplement     Deconditioning/weakness  PT/OT to eval and treat  Fall Precautions in place     Katie Romero at [facility]   [phone]  12/27/17

## 2017-12-28 ENCOUNTER — LAB ENCOUNTER (OUTPATIENT)
Dept: LAB | Age: 82
End: 2017-12-28
Attending: NURSE PRACTITIONER
Payer: MEDICARE

## 2017-12-28 ENCOUNTER — SNF VISIT (OUTPATIENT)
Dept: INTERNAL MEDICINE CLINIC | Age: 82
End: 2017-12-28

## 2017-12-28 DIAGNOSIS — Z71.1 PERSON WITH FEARED COMPLAINT IN WHOM NO DIAGNOSIS WAS MADE: Primary | ICD-10-CM

## 2017-12-28 DIAGNOSIS — E87.0 HYPEROSMOLALITY AND/OR HYPERNATREMIA: Primary | ICD-10-CM

## 2017-12-28 PROCEDURE — 80053 COMPREHEN METABOLIC PANEL: CPT

## 2017-12-29 ENCOUNTER — NURSE ONLY (OUTPATIENT)
Dept: LAB | Age: 82
End: 2017-12-29
Attending: FAMILY MEDICINE
Payer: MEDICARE

## 2017-12-29 ENCOUNTER — SNF DISCHARGE (OUTPATIENT)
Dept: INTERNAL MEDICINE CLINIC | Age: 82
End: 2017-12-29

## 2017-12-29 ENCOUNTER — SNF VISIT (OUTPATIENT)
Dept: INTERNAL MEDICINE CLINIC | Age: 82
End: 2017-12-29

## 2017-12-29 VITALS
RESPIRATION RATE: 18 BRPM | HEART RATE: 64 BPM | SYSTOLIC BLOOD PRESSURE: 110 MMHG | OXYGEN SATURATION: 96 % | TEMPERATURE: 97 F | DIASTOLIC BLOOD PRESSURE: 66 MMHG

## 2017-12-29 DIAGNOSIS — E87.2 METABOLIC ACIDOSIS: ICD-10-CM

## 2017-12-29 DIAGNOSIS — K50.819 CROHN'S DISEASE OF SMALL AND LARGE INTESTINES WITH COMPLICATION (HCC): ICD-10-CM

## 2017-12-29 DIAGNOSIS — E87.5 HYPERKALEMIA: Primary | ICD-10-CM

## 2017-12-29 DIAGNOSIS — R26.9 ABNORMAL GAIT: ICD-10-CM

## 2017-12-29 DIAGNOSIS — E86.0 DEHYDRATION: ICD-10-CM

## 2017-12-29 DIAGNOSIS — N17.9 AKI (ACUTE KIDNEY INJURY) (HCC): ICD-10-CM

## 2017-12-29 DIAGNOSIS — Z71.1 PERSON WITH FEARED COMPLAINT IN WHOM NO DIAGNOSIS WAS MADE: Primary | ICD-10-CM

## 2017-12-29 PROCEDURE — 99316 NF DSCHRG MGMT 30 MIN+: CPT | Performed by: NURSE PRACTITIONER

## 2017-12-29 PROCEDURE — 80053 COMPREHEN METABOLIC PANEL: CPT

## 2017-12-29 NOTE — PROGRESS NOTES
Low vitamin D levels, advice to replace with 50,000 IU a week for 12 weeks than daily replacement with OTC 1000 IU a day per recommended guidelines.     Electrolyte abnormality per renal

## 2017-12-29 NOTE — PROGRESS NOTES
Dane Ramos, 7/28/1934, 80year old, female is being discharged from Facility: [address] [place] [address] at [id number] [place] [address]    Date of Admission:12/16/2017    Date of Discharge:12/30/2017                               Admitting Diagnoses:A deficit, cranial nerves intact II-XII, follows commands  PSYCHIATRIC: alert and oriented x 3; affect appropriate       Skilled Nursing Facility Course:  Patient was admitted to the [facility] for physical rehabilitation.   During this time labs were drawn and

## 2017-12-30 ENCOUNTER — NURSE ONLY (OUTPATIENT)
Dept: LAB | Age: 82
End: 2017-12-30
Attending: FAMILY MEDICINE
Payer: MEDICARE

## 2017-12-30 DIAGNOSIS — R89.9 ELEVATED LABORATORY TEST RESULT: Primary | ICD-10-CM

## 2017-12-30 LAB
ALBUMIN SERPL-MCNC: 3.4 G/DL (ref 3.5–4.8)
ALP LIVER SERPL-CCNC: 130 U/L (ref 55–142)
ALT SERPL-CCNC: 23 U/L (ref 14–54)
AST SERPL-CCNC: 17 U/L (ref 15–41)
BILIRUB SERPL-MCNC: 0.6 MG/DL (ref 0.1–2)
BUN BLD-MCNC: 48 MG/DL (ref 8–20)
CALCIUM BLD-MCNC: 9.7 MG/DL (ref 8.3–10.3)
CHLORIDE: 113 MMOL/L (ref 101–111)
CO2: 19 MMOL/L (ref 22–32)
CREAT BLD-MCNC: 2.54 MG/DL (ref 0.55–1.02)
GLUCOSE BLD-MCNC: 106 MG/DL (ref 70–99)
M PROTEIN MFR SERPL ELPH: 7.4 G/DL (ref 6.1–8.3)
POTASSIUM SERPL-SCNC: 4.7 MMOL/L (ref 3.6–5.1)
SODIUM SERPL-SCNC: 142 MMOL/L (ref 136–144)

## 2017-12-30 PROCEDURE — 80053 COMPREHEN METABOLIC PANEL: CPT

## 2017-12-30 NOTE — PROGRESS NOTES
Telephone Information:  Home Phone      [phone] no name stated. [facility]          [phone] no name stated.  [facility]

## 2017-12-31 ENCOUNTER — NURSE ONLY (OUTPATIENT)
Dept: LAB | Age: 82
End: 2017-12-31
Attending: FAMILY MEDICINE
Payer: MEDICARE

## 2017-12-31 DIAGNOSIS — N18.9 CKD (CHRONIC KIDNEY DISEASE): Primary | ICD-10-CM

## 2017-12-31 LAB
ALBUMIN SERPL-MCNC: 2.7 G/DL (ref 3.5–4.8)
ALP LIVER SERPL-CCNC: 102 U/L (ref 55–142)
ALT SERPL-CCNC: 18 U/L (ref 14–54)
AST SERPL-CCNC: 15 U/L (ref 15–41)
BILIRUB SERPL-MCNC: 0.8 MG/DL (ref 0.1–2)
BUN BLD-MCNC: 44 MG/DL (ref 8–20)
CALCIUM BLD-MCNC: 9.2 MG/DL (ref 8.3–10.3)
CHLORIDE: 117 MMOL/L (ref 101–111)
CO2: 19 MMOL/L (ref 22–32)
CREAT BLD-MCNC: 2.48 MG/DL (ref 0.55–1.02)
GLUCOSE BLD-MCNC: 85 MG/DL (ref 70–99)
M PROTEIN MFR SERPL ELPH: 5.9 G/DL (ref 6.1–8.3)
POTASSIUM SERPL-SCNC: 5.8 MMOL/L (ref 3.6–5.1)
SODIUM SERPL-SCNC: 141 MMOL/L (ref 136–144)

## 2017-12-31 PROCEDURE — G9678 ONCOLOGY CARE MODEL SERVICE: HCPCS | Performed by: INTERNAL MEDICINE

## 2017-12-31 PROCEDURE — 80053 COMPREHEN METABOLIC PANEL: CPT

## 2018-01-02 NOTE — PROGRESS NOTES
Phone rings and disconnects x3  Flagged for f/u  Need to assess mag and place additional orders of needed. Advise on Vitamin D recommendations too.

## 2018-01-04 ENCOUNTER — OFFICE VISIT (OUTPATIENT)
Dept: HEMATOLOGY/ONCOLOGY | Age: 83
End: 2018-01-04
Attending: CLINICAL NURSE SPECIALIST
Payer: MEDICARE

## 2018-01-04 ENCOUNTER — TELEPHONE (OUTPATIENT)
Dept: HEMATOLOGY/ONCOLOGY | Facility: HOSPITAL | Age: 83
End: 2018-01-04

## 2018-01-04 VITALS
RESPIRATION RATE: 18 BRPM | WEIGHT: 119.81 LBS | OXYGEN SATURATION: 98 % | DIASTOLIC BLOOD PRESSURE: 74 MMHG | SYSTOLIC BLOOD PRESSURE: 124 MMHG | TEMPERATURE: 97 F | HEART RATE: 79 BPM | BODY MASS INDEX: 20 KG/M2

## 2018-01-04 DIAGNOSIS — L02.91 ABSCESS: Primary | ICD-10-CM

## 2018-01-04 DIAGNOSIS — C20 RECTAL CANCER (HCC): ICD-10-CM

## 2018-01-04 PROCEDURE — 99213 OFFICE O/P EST LOW 20 MIN: CPT | Performed by: CLINICAL NURSE SPECIALIST

## 2018-01-04 RX ORDER — METRONIDAZOLE 500 MG/1
500 TABLET ORAL 3 TIMES DAILY
Qty: 30 TABLET | Refills: 0 | Status: SHIPPED | OUTPATIENT
Start: 2018-01-04 | End: 2018-01-20

## 2018-01-04 NOTE — PROGRESS NOTES
[phone] (home)   Mobile          [phone]  Left message on voice mails with hours to call back.

## 2018-01-04 NOTE — TELEPHONE ENCOUNTER
Pt calling with c/o rectal abscess, states Dr. Robin Gomes is out of town and can't see her. Requesting to be seen. C/o problem for 4-5 days now, looking for abx. Notes having \"over 30 of these in her lifetime\".  Appt made to see Lea Gutiérrez NP this am.

## 2018-01-05 NOTE — PROGRESS NOTES
Results released by  with comments for patient review. Pt has not reviewed & does not appear to check. Letter drafted & routed to MA pool for printing/mailing. Clinical references included.     If/when pt calls back inquire if OK to disable My Chart o

## 2018-01-11 NOTE — PROGRESS NOTES
ANP Visit Note    Patient Name: Chris Santana   YOB: 1934   Medical Record Number: [id number]   CSN: [id number]   Date of visit: 1/04/2018       Chief Complaint/Reason for Visit:  History of rectal cancer, painful lump in gluteal fold. Blood disorder    • Cancer ([facility]     vulva 14 years ago, tx with surgery alone.    • Crohn disease ([facility]    • Crohn's disease ([facility]    • Esophageal reflux    • Fibromyalgia    • Hearing impairment    • High blood pressure    • History of blood transfusion Comment:chrohs disease  Pcn [Bicillin L-A]      Rash  Sulfa Antibiotics       Rash    Family History:  Family History   Problem Relation Age of Onset   • Cancer Sister      breast cancer   • Stroke Sister    • Heart Attack Sister    • Neurological Disorder for the most part. Medications:    Current Outpatient Prescriptions:   •  metRONIDAZOLE 500 MG Oral Tab, Take 1 tablet (500 mg total) by mouth 3 (three) times daily. , Disp: 30 tablet, Rfl: 0  •  [START ON 4/5/2018] Cholecalciferol (VITAMIN D) 10 acute distress. Vital Signs: /74 (BP Location: Left arm, Patient Position: Sitting, Cuff Size: adult)   Pulse 79   Temp (!) 96.5 °F (35.8 °C) (Tympanic)   Resp 18   Wt 54.3 kg (119 lb 12.8 oz)   SpO2 98%   BMI 19.94 kg/m²   HEENT: EOMs intact.  PERRL Final  Total Protein                                 Date: 12/31/2017  Value: 5.9*        Ref range: 6.1 - 8.3 g/dL     Status: Final  Albumin                                       Date: 12/31/2017  Value: 2.7*        Ref range: 3.5 - 4.8 g/dL     Status:

## 2018-01-12 DIAGNOSIS — L02.91 ABSCESS: ICD-10-CM

## 2018-01-12 RX ORDER — METRONIDAZOLE 500 MG/1
500 TABLET ORAL 3 TIMES DAILY
Qty: 30 TABLET | Refills: 0 | OUTPATIENT
Start: 2018-01-12 | End: 2018-01-22

## 2018-01-20 DIAGNOSIS — L02.91 ABSCESS: ICD-10-CM

## 2018-01-22 ENCOUNTER — TELEPHONE (OUTPATIENT)
Dept: NEPHROLOGY | Facility: CLINIC | Age: 83
End: 2018-01-22

## 2018-01-22 NOTE — TELEPHONE ENCOUNTER
Pt cancelled appt for f-u with dr Anita Carney. She is unable to walk without leaning on someone. She does not have anyone to help her get to the office.   She wcb to sched an appt when she can walk

## 2018-01-23 RX ORDER — METRONIDAZOLE 500 MG/1
500 TABLET ORAL 3 TIMES DAILY
Qty: 30 TABLET | Refills: 0 | Status: SHIPPED | OUTPATIENT
Start: 2018-01-23 | End: 2018-02-02

## 2018-02-01 ENCOUNTER — NURSE ONLY (OUTPATIENT)
Dept: HEMATOLOGY/ONCOLOGY | Age: 83
End: 2018-02-01
Attending: INTERNAL MEDICINE
Payer: MEDICARE

## 2018-02-01 DIAGNOSIS — C20 RECTAL CANCER (HCC): ICD-10-CM

## 2018-02-01 LAB
ALBUMIN SERPL-MCNC: 3.3 G/DL (ref 3.5–4.8)
ALP LIVER SERPL-CCNC: 124 U/L (ref 55–142)
ALT SERPL-CCNC: 22 U/L (ref 14–54)
AST SERPL-CCNC: 17 U/L (ref 15–41)
BASOPHILS # BLD AUTO: 0.04 X10(3) UL (ref 0–0.1)
BASOPHILS NFR BLD AUTO: 0.6 %
BILIRUB SERPL-MCNC: 0.6 MG/DL (ref 0.1–2)
BUN BLD-MCNC: 43 MG/DL (ref 8–20)
CALCIUM BLD-MCNC: 9.8 MG/DL (ref 8.3–10.3)
CEA: 1.9 NG/ML (ref 0.5–5)
CHLORIDE: 108 MMOL/L (ref 101–111)
CO2: 18 MMOL/L (ref 22–32)
CREAT BLD-MCNC: 2.56 MG/DL (ref 0.55–1.02)
EOSINOPHIL # BLD AUTO: 0.08 X10(3) UL (ref 0–0.3)
EOSINOPHIL NFR BLD AUTO: 1.2 %
ERYTHROCYTE [DISTWIDTH] IN BLOOD BY AUTOMATED COUNT: 19 % (ref 11.5–16)
GLUCOSE BLD-MCNC: 134 MG/DL (ref 70–99)
HCT VFR BLD AUTO: 28.4 % (ref 34–50)
HGB BLD-MCNC: 9.3 G/DL (ref 12–16)
IMMATURE GRANULOCYTE COUNT: 0.03 X10(3) UL (ref 0–1)
IMMATURE GRANULOCYTE RATIO %: 0.4 %
LDH: 117 U/L (ref 84–246)
LYMPHOCYTES # BLD AUTO: 0.58 X10(3) UL (ref 0.9–4)
LYMPHOCYTES NFR BLD AUTO: 8.7 %
M PROTEIN MFR SERPL ELPH: 7.2 G/DL (ref 6.1–8.3)
MCH RBC QN AUTO: 22.5 PG (ref 27–33.2)
MCHC RBC AUTO-ENTMCNC: 32.7 G/DL (ref 31–37)
MCV RBC AUTO: 68.8 FL (ref 81–100)
MONOCYTES # BLD AUTO: 0.42 X10(3) UL (ref 0.1–0.6)
MONOCYTES NFR BLD AUTO: 6.3 %
NEUTROPHIL ABS PRELIM: 5.54 X10 (3) UL (ref 1.3–6.7)
NEUTROPHILS # BLD AUTO: 5.54 X10(3) UL (ref 1.3–6.7)
NEUTROPHILS NFR BLD AUTO: 82.8 %
PLATELET # BLD AUTO: 262 10(3)UL (ref 150–450)
POTASSIUM SERPL-SCNC: 5.2 MMOL/L (ref 3.6–5.1)
RBC # BLD AUTO: 4.13 X10(6)UL (ref 3.8–5.1)
RED CELL DISTRIBUTION WIDTH-SD: 44.7 FL (ref 35.1–46.3)
SODIUM SERPL-SCNC: 137 MMOL/L (ref 136–144)
WBC # BLD AUTO: 6.7 X10(3) UL (ref 4–13)

## 2018-02-01 PROCEDURE — 80053 COMPREHEN METABOLIC PANEL: CPT

## 2018-02-01 PROCEDURE — 83615 LACTATE (LD) (LDH) ENZYME: CPT

## 2018-02-01 PROCEDURE — 36415 COLL VENOUS BLD VENIPUNCTURE: CPT

## 2018-02-01 PROCEDURE — 85025 COMPLETE CBC W/AUTO DIFF WBC: CPT

## 2018-02-01 PROCEDURE — 82378 CARCINOEMBRYONIC ANTIGEN: CPT

## 2018-03-29 ENCOUNTER — OFFICE VISIT (OUTPATIENT)
Dept: HEMATOLOGY/ONCOLOGY | Age: 83
End: 2018-03-29
Attending: INTERNAL MEDICINE
Payer: MEDICARE

## 2018-03-29 ENCOUNTER — TELEPHONE (OUTPATIENT)
Dept: HEMATOLOGY/ONCOLOGY | Facility: HOSPITAL | Age: 83
End: 2018-03-29

## 2018-03-29 VITALS
WEIGHT: 120 LBS | HEART RATE: 75 BPM | SYSTOLIC BLOOD PRESSURE: 125 MMHG | TEMPERATURE: 97 F | DIASTOLIC BLOOD PRESSURE: 78 MMHG | BODY MASS INDEX: 21 KG/M2 | RESPIRATION RATE: 18 BRPM

## 2018-03-29 DIAGNOSIS — N18.30 ANEMIA IN STAGE 3 CHRONIC KIDNEY DISEASE (HCC): ICD-10-CM

## 2018-03-29 DIAGNOSIS — D63.1 ANEMIA IN STAGE 3 CHRONIC KIDNEY DISEASE (HCC): ICD-10-CM

## 2018-03-29 DIAGNOSIS — C20 RECTAL CANCER (HCC): Primary | ICD-10-CM

## 2018-03-29 DIAGNOSIS — D56.8 OTHER THALASSEMIA (HCC): ICD-10-CM

## 2018-03-29 DIAGNOSIS — R29.890 HEIGHT LOSS: ICD-10-CM

## 2018-03-29 DIAGNOSIS — M80.00XD AGE-RELATED OSTEOPOROSIS WITH CURRENT PATHOLOGICAL FRACTURE WITH ROUTINE HEALING, SUBSEQUENT ENCOUNTER: ICD-10-CM

## 2018-03-29 PROCEDURE — 99214 OFFICE O/P EST MOD 30 MIN: CPT | Performed by: INTERNAL MEDICINE

## 2018-03-29 NOTE — TELEPHONE ENCOUNTER
Spoke with patient about her Hgb (6.8). She's a little fatigued, but not SOB and no CP. Denies seeing active bleeding - no melena either. Asked her to come to [facility] by 9AM - will need type and cross and repeat CBC - will need at least one unit of blood.

## 2018-03-29 NOTE — PROGRESS NOTES
Pt here for 4 month MD f/u. Energy level has been low, feels her broken hip isn't healing as quickly as she'd like. Appetite has been fair. Pt has pain in hip.      Education Record    Learner:  Patient    Disease / Diagnosis:    Barriers / Limitations:  No

## 2018-03-30 ENCOUNTER — APPOINTMENT (OUTPATIENT)
Dept: CT IMAGING | Age: 83
End: 2018-03-30
Attending: EMERGENCY MEDICINE
Payer: MEDICARE

## 2018-03-30 ENCOUNTER — HOSPITAL ENCOUNTER (EMERGENCY)
Age: 83
Discharge: HOME | End: 2018-03-30
Attending: EMERGENCY MEDICINE
Payer: MEDICARE

## 2018-03-30 ENCOUNTER — APPOINTMENT (OUTPATIENT)
Dept: HEMATOLOGY/ONCOLOGY | Age: 83
End: 2018-03-30
Attending: INTERNAL MEDICINE
Payer: MEDICARE

## 2018-03-30 ENCOUNTER — OFFICE VISIT (OUTPATIENT)
Dept: HEMATOLOGY/ONCOLOGY | Age: 83
End: 2018-03-30
Attending: INTERNAL MEDICINE
Payer: MEDICARE

## 2018-03-30 ENCOUNTER — APPOINTMENT (OUTPATIENT)
Dept: GENERAL RADIOLOGY | Age: 83
End: 2018-03-30
Attending: PHYSICIAN ASSISTANT
Payer: MEDICARE

## 2018-03-30 VITALS
WEIGHT: 120 LBS | BODY MASS INDEX: 20.24 KG/M2 | TEMPERATURE: 97 F | SYSTOLIC BLOOD PRESSURE: 122 MMHG | DIASTOLIC BLOOD PRESSURE: 68 MMHG | RESPIRATION RATE: 16 BRPM | HEIGHT: 64.61 IN | HEART RATE: 77 BPM | OXYGEN SATURATION: 98 %

## 2018-03-30 VITALS
WEIGHT: 121 LBS | BODY MASS INDEX: 20.16 KG/M2 | SYSTOLIC BLOOD PRESSURE: 159 MMHG | OXYGEN SATURATION: 99 % | TEMPERATURE: 98 F | RESPIRATION RATE: 18 BRPM | HEIGHT: 65 IN | DIASTOLIC BLOOD PRESSURE: 55 MMHG | HEART RATE: 75 BPM

## 2018-03-30 DIAGNOSIS — E86.0 DEHYDRATION: ICD-10-CM

## 2018-03-30 DIAGNOSIS — S32.591A CLOSED FRACTURE OF RAMUS OF RIGHT PUBIS, INITIAL ENCOUNTER (HCC): Primary | ICD-10-CM

## 2018-03-30 DIAGNOSIS — D50.0 IRON DEFICIENCY ANEMIA DUE TO CHRONIC BLOOD LOSS: ICD-10-CM

## 2018-03-30 DIAGNOSIS — D63.1 ANEMIA IN STAGE 4 CHRONIC KIDNEY DISEASE (HCC): Primary | ICD-10-CM

## 2018-03-30 DIAGNOSIS — N18.4 ANEMIA IN STAGE 4 CHRONIC KIDNEY DISEASE (HCC): Primary | ICD-10-CM

## 2018-03-30 LAB
ANTIBODY SCREEN: NEGATIVE
BASOPHILS # BLD AUTO: 0.03 X10(3) UL (ref 0–0.1)
BASOPHILS NFR BLD AUTO: 0.5 %
EOSINOPHIL # BLD AUTO: 0.11 X10(3) UL (ref 0–0.3)
EOSINOPHIL NFR BLD AUTO: 1.9 %
ERYTHROCYTE [DISTWIDTH] IN BLOOD BY AUTOMATED COUNT: 18.7 % (ref 11.5–16)
HCT VFR BLD AUTO: 21.6 % (ref 34–50)
HGB BLD-MCNC: 6.8 G/DL (ref 12–16)
IMMATURE GRANULOCYTE COUNT: 0.05 X10(3) UL (ref 0–1)
IMMATURE GRANULOCYTE RATIO %: 0.9 %
LYMPHOCYTES # BLD AUTO: 0.64 X10(3) UL (ref 0.9–4)
LYMPHOCYTES NFR BLD AUTO: 11.1 %
MCH RBC QN AUTO: 21.3 PG (ref 27–33.2)
MCHC RBC AUTO-ENTMCNC: 31.5 G/DL (ref 31–37)
MCV RBC AUTO: 67.7 FL (ref 81–100)
MONOCYTES # BLD AUTO: 0.47 X10(3) UL (ref 0.1–1)
MONOCYTES NFR BLD AUTO: 8.1 %
NEUTROPHIL ABS PRELIM: 4.48 X10 (3) UL (ref 1.3–6.7)
NEUTROPHILS # BLD AUTO: 4.48 X10(3) UL (ref 1.3–6.7)
NEUTROPHILS NFR BLD AUTO: 77.5 %
PLATELET # BLD AUTO: 241 10(3)UL (ref 150–450)
RBC # BLD AUTO: 3.19 X10(6)UL (ref 3.8–5.1)
RED CELL DISTRIBUTION WIDTH-SD: 43.4 FL (ref 35.1–46.3)
RH BLOOD TYPE: POSITIVE
WBC # BLD AUTO: 5.8 X10(3) UL (ref 4–13)

## 2018-03-30 PROCEDURE — 86850 RBC ANTIBODY SCREEN: CPT

## 2018-03-30 PROCEDURE — 73523 X-RAY EXAM HIPS BI 5/> VIEWS: CPT | Performed by: PHYSICIAN ASSISTANT

## 2018-03-30 PROCEDURE — 99284 EMERGENCY DEPT VISIT MOD MDM: CPT

## 2018-03-30 PROCEDURE — 96372 THER/PROPH/DIAG INJ SC/IM: CPT

## 2018-03-30 PROCEDURE — 86920 COMPATIBILITY TEST SPIN: CPT

## 2018-03-30 PROCEDURE — 86901 BLOOD TYPING SEROLOGIC RH(D): CPT

## 2018-03-30 PROCEDURE — 36430 TRANSFUSION BLD/BLD COMPNT: CPT

## 2018-03-30 PROCEDURE — 85025 COMPLETE CBC W/AUTO DIFF WBC: CPT

## 2018-03-30 PROCEDURE — 36415 COLL VENOUS BLD VENIPUNCTURE: CPT

## 2018-03-30 PROCEDURE — 72192 CT PELVIS W/O DYE: CPT | Performed by: EMERGENCY MEDICINE

## 2018-03-30 PROCEDURE — 86900 BLOOD TYPING SEROLOGIC ABO: CPT

## 2018-03-30 RX ORDER — ACETAMINOPHEN 325 MG/1
650 TABLET ORAL ONCE
Status: CANCELLED | OUTPATIENT
Start: 2018-03-30

## 2018-03-30 RX ORDER — ACETAMINOPHEN 500 MG
500 TABLET ORAL ONCE
Status: COMPLETED | OUTPATIENT
Start: 2018-03-30 | End: 2018-03-30

## 2018-03-30 RX ORDER — ACETAMINOPHEN 325 MG/1
650 TABLET ORAL ONCE
Status: COMPLETED | OUTPATIENT
Start: 2018-03-30 | End: 2018-03-30

## 2018-03-30 RX ORDER — DIPHENHYDRAMINE HCL 25 MG
25 CAPSULE ORAL ONCE
Status: CANCELLED | OUTPATIENT
Start: 2018-03-30

## 2018-03-30 RX ORDER — TRAMADOL HYDROCHLORIDE 50 MG/1
TABLET ORAL EVERY 4 HOURS PRN
Qty: 20 TABLET | Refills: 0 | Status: SHIPPED | OUTPATIENT
Start: 2018-03-30 | End: 2018-04-06

## 2018-03-30 RX ORDER — DIPHENHYDRAMINE HCL 25 MG
25 CAPSULE ORAL ONCE
Status: COMPLETED | OUTPATIENT
Start: 2018-03-30 | End: 2018-03-30

## 2018-03-30 RX ORDER — ACETAMINOPHEN 500 MG
1000 TABLET ORAL ONCE
Status: DISCONTINUED | OUTPATIENT
Start: 2018-03-30 | End: 2018-03-30

## 2018-03-30 RX ADMIN — ACETAMINOPHEN 325 MG: 325 TABLET ORAL at 10:46:00

## 2018-03-30 RX ADMIN — DIPHENHYDRAMINE HCL 25 MG: 25 MG CAPSULE ORAL at 10:46:00

## 2018-03-30 NOTE — ED NOTES
Pt having difficulty with ambulation. States she feels \"a little better\", but still increased pain present. Discharge on hold at this time.  CT scan ordered

## 2018-03-30 NOTE — PROGRESS NOTES
Patient here for 1 unit PRBC and to start procrit. When reviewed procrit with her, she states she also receives this with Dr Tommie Calix, but it has been a few months. Reviewed chart.   She last received aranesp Feb. 2017 and at that time Dr Kaylyn Pantoja note Luz Ford

## 2018-03-30 NOTE — ED PROVIDER NOTES
I reviewed that chart and discussed the case.   I have examined the patient and noted neurovascular intact in the right lower extremity full range of motion no vertebral tenderness to palpation I reviewed the CT scan findings with the patient she was given

## 2018-03-30 NOTE — PATIENT INSTRUCTIONS
Post Transfusion Instructions for Out-Patients    Most recipients of blood transfusions do not experience any adverse effects. You may resume your normal activities 4 to 6 hours after your blood transfusion.   Occasionally, reactions of blood transfusions It is important that you put your used needles and syringes in a special sharps container. Do not put them in a trash can. If you do not have a sharps container, call your pharmacist or healthcare provider to get one.   Dilia Bhatti will be given to abraham Store in a refrigerator between 2 and 8 degrees C (36 and 46 degrees F). Do not freeze or shake. Throw away any unused portion if using a single-dose vial. Multi-dose vials can be kept in the refrigerator for up to 21 days after the initial dose.  Throw terrance

## 2018-03-30 NOTE — ED NOTES
Pt getting upset because she is hungry and hasnt eaten all day. Beverly Weaver ordered for this patient.

## 2018-03-30 NOTE — ED PROVIDER NOTES
Patient Seen in: THE [facility] [place]    History   Patient presents with:  Hip Pain    Stated Complaint: new onset hip pain    HPI  80-year-old  female with a history of arthritis, fibromyalgia, anxiety, no cancer currently Past Surgical History:  No date: APPENDECTOMY  No date: APPENDECTOMY  No date: COLECTOMY  No date: COLONOSCOPY      Comment: 6/09 no dysplasia.   Repeat 2014 5/13/2014: COLONOSCOPY      Comment: Procedure: COLONOSCOPY;  Surgeon: Christ Desir Labs Reviewed - No data to display    ED Course as of Mar 30 1824  ------------------------------------------------------------  Time: 03/30 9580  Value: XR HIP W OR WO PELVIS(MIN 5 VIEWS),BILAT(CPT=73523) Once  Comment: (Reviewed)  ----------------------- Clara Loya, [address] [phone]    In 2 days          Medications Prescribed:  Current Discharge Medication List

## 2018-03-31 LAB — BLOOD TYPE BARCODE: 5100

## 2018-04-10 ENCOUNTER — TELEPHONE (OUTPATIENT)
Dept: HEMATOLOGY/ONCOLOGY | Facility: HOSPITAL | Age: 83
End: 2018-04-10

## 2018-04-10 NOTE — TELEPHONE ENCOUNTER
----- Message from Leatha Galicia MD sent at 4/10/2018  6:55 AM CDT -----  Regarding: Follow-up  She needs to come in every two weeks for CBC and possible procrit - goal should be Hgb of 9 given her thalassemia. Please call and arrange.   She had a pelv

## 2018-04-10 NOTE — PROGRESS NOTES
[facility]    PATIENT'S NAME: Juve Dear, Scott BULLOCK   ATTENDING PHYSICIAN: Len Karimi M.D.    PATIENT ACCOUNT #: [de-identified] LOCATION: [address] [place] [address] RECORD #: [id number] YOB: 1934   DATE OF SERVICE: 03/29/2018       CANCER sclerae. Pharynx without lesions. LYMPHATICS:  She has no cervical, supraclavicular, or axillary adenopathy. LUNGS:  Resonant to percussion and clear to auscultation, with no wheezing, rales, or rhonchi.    HEART:  Normal.   ABDOMEN:  No hepatosplenom

## 2018-04-12 PROBLEM — R79.89 AZOTEMIA: Status: RESOLVED | Noted: 2017-05-16 | Resolved: 2018-04-12

## 2018-04-12 PROBLEM — R07.9 CHEST PAIN: Status: RESOLVED | Noted: 2017-05-11 | Resolved: 2018-04-12

## 2018-04-12 PROBLEM — N17.9 ACUTE RENAL FAILURE, UNSPECIFIED ACUTE RENAL FAILURE TYPE (HCC): Status: RESOLVED | Noted: 2017-05-16 | Resolved: 2018-04-12

## 2018-04-13 ENCOUNTER — OFFICE VISIT (OUTPATIENT)
Dept: HEMATOLOGY/ONCOLOGY | Age: 83
End: 2018-04-13
Attending: INTERNAL MEDICINE
Payer: MEDICARE

## 2018-04-13 VITALS
OXYGEN SATURATION: 98 % | RESPIRATION RATE: 16 BRPM | DIASTOLIC BLOOD PRESSURE: 74 MMHG | SYSTOLIC BLOOD PRESSURE: 120 MMHG | TEMPERATURE: 98 F | HEART RATE: 76 BPM

## 2018-04-13 DIAGNOSIS — E86.0 DEHYDRATION: ICD-10-CM

## 2018-04-13 DIAGNOSIS — D50.0 IRON DEFICIENCY ANEMIA DUE TO CHRONIC BLOOD LOSS: Primary | ICD-10-CM

## 2018-04-13 PROCEDURE — 85025 COMPLETE CBC W/AUTO DIFF WBC: CPT

## 2018-04-13 PROCEDURE — 36415 COLL VENOUS BLD VENIPUNCTURE: CPT

## 2018-04-13 PROCEDURE — 96372 THER/PROPH/DIAG INJ SC/IM: CPT

## 2018-04-14 ENCOUNTER — HOSPITAL ENCOUNTER (OUTPATIENT)
Dept: CT IMAGING | Age: 83
Discharge: HOME | End: 2018-04-14
Attending: INTERNAL MEDICINE
Payer: MEDICARE

## 2018-04-14 DIAGNOSIS — C20 RECTAL CANCER (HCC): ICD-10-CM

## 2018-04-14 PROCEDURE — 74176 CT ABD & PELVIS W/O CONTRAST: CPT | Performed by: INTERNAL MEDICINE

## 2018-04-14 PROCEDURE — 71250 CT THORAX DX C-: CPT | Performed by: INTERNAL MEDICINE

## 2018-04-27 ENCOUNTER — OFFICE VISIT (OUTPATIENT)
Dept: HEMATOLOGY/ONCOLOGY | Age: 83
End: 2018-04-27
Attending: INTERNAL MEDICINE
Payer: MEDICARE

## 2018-04-27 DIAGNOSIS — D50.0 IRON DEFICIENCY ANEMIA DUE TO CHRONIC BLOOD LOSS: Primary | ICD-10-CM

## 2018-04-27 DIAGNOSIS — E86.0 DEHYDRATION: ICD-10-CM

## 2018-04-27 PROCEDURE — 85025 COMPLETE CBC W/AUTO DIFF WBC: CPT

## 2018-04-27 PROCEDURE — 36415 COLL VENOUS BLD VENIPUNCTURE: CPT

## 2018-04-27 PROCEDURE — 96372 THER/PROPH/DIAG INJ SC/IM: CPT

## 2018-05-11 ENCOUNTER — OFFICE VISIT (OUTPATIENT)
Dept: HEMATOLOGY/ONCOLOGY | Age: 83
End: 2018-05-11
Attending: INTERNAL MEDICINE
Payer: MEDICARE

## 2018-05-11 DIAGNOSIS — D50.0 IRON DEFICIENCY ANEMIA DUE TO CHRONIC BLOOD LOSS: Primary | ICD-10-CM

## 2018-05-11 DIAGNOSIS — E86.0 DEHYDRATION: ICD-10-CM

## 2018-05-11 PROCEDURE — 36415 COLL VENOUS BLD VENIPUNCTURE: CPT

## 2018-05-11 PROCEDURE — 85025 COMPLETE CBC W/AUTO DIFF WBC: CPT

## 2018-05-23 PROBLEM — M25.551 HIP PAIN, ACUTE, RIGHT: Status: ACTIVE | Noted: 2017-12-13

## 2018-05-25 ENCOUNTER — OFFICE VISIT (OUTPATIENT)
Dept: HEMATOLOGY/ONCOLOGY | Age: 83
End: 2018-05-25
Attending: INTERNAL MEDICINE
Payer: MEDICARE

## 2018-05-25 DIAGNOSIS — D50.0 IRON DEFICIENCY ANEMIA DUE TO CHRONIC BLOOD LOSS: ICD-10-CM

## 2018-05-25 DIAGNOSIS — E86.0 DEHYDRATION: Primary | ICD-10-CM

## 2018-05-31 ENCOUNTER — OFFICE VISIT (OUTPATIENT)
Dept: HEMATOLOGY/ONCOLOGY | Age: 83
End: 2018-05-31
Attending: INTERNAL MEDICINE
Payer: MEDICARE

## 2018-05-31 DIAGNOSIS — D50.0 IRON DEFICIENCY ANEMIA DUE TO CHRONIC BLOOD LOSS: ICD-10-CM

## 2018-05-31 DIAGNOSIS — E86.0 DEHYDRATION: Primary | ICD-10-CM

## 2018-05-31 PROCEDURE — 36415 COLL VENOUS BLD VENIPUNCTURE: CPT

## 2018-05-31 PROCEDURE — 85025 COMPLETE CBC W/AUTO DIFF WBC: CPT

## 2018-05-31 PROCEDURE — 96372 THER/PROPH/DIAG INJ SC/IM: CPT

## 2018-06-08 ENCOUNTER — OFFICE VISIT (OUTPATIENT)
Dept: HEMATOLOGY/ONCOLOGY | Age: 83
End: 2018-06-08
Attending: INTERNAL MEDICINE
Payer: MEDICARE

## 2018-06-08 DIAGNOSIS — D50.0 IRON DEFICIENCY ANEMIA DUE TO CHRONIC BLOOD LOSS: Primary | ICD-10-CM

## 2018-06-08 DIAGNOSIS — E86.0 DEHYDRATION: ICD-10-CM

## 2018-06-08 PROCEDURE — 85025 COMPLETE CBC W/AUTO DIFF WBC: CPT

## 2018-06-08 PROCEDURE — 96372 THER/PROPH/DIAG INJ SC/IM: CPT

## 2018-06-08 PROCEDURE — 36415 COLL VENOUS BLD VENIPUNCTURE: CPT

## 2018-06-21 ENCOUNTER — TELEPHONE (OUTPATIENT)
Dept: HEMATOLOGY/ONCOLOGY | Facility: HOSPITAL | Age: 83
End: 2018-06-21

## 2018-06-21 NOTE — TELEPHONE ENCOUNTER
Pt calling with c/o feeling like she has to have a normal BM almost constantly, has ostomy bag. DW Dr. Liliana Rosales, pt to get MRI pelvis, will call as soon as we see results.

## 2018-06-22 ENCOUNTER — OFFICE VISIT (OUTPATIENT)
Dept: HEMATOLOGY/ONCOLOGY | Age: 83
End: 2018-06-22
Attending: INTERNAL MEDICINE
Payer: MEDICARE

## 2018-06-22 DIAGNOSIS — D50.0 IRON DEFICIENCY ANEMIA DUE TO CHRONIC BLOOD LOSS: ICD-10-CM

## 2018-06-22 DIAGNOSIS — E86.0 DEHYDRATION: Primary | ICD-10-CM

## 2018-06-22 PROCEDURE — 85025 COMPLETE CBC W/AUTO DIFF WBC: CPT

## 2018-06-22 PROCEDURE — 36415 COLL VENOUS BLD VENIPUNCTURE: CPT

## 2018-06-28 ENCOUNTER — TELEPHONE (OUTPATIENT)
Dept: HEMATOLOGY/ONCOLOGY | Facility: HOSPITAL | Age: 83
End: 2018-06-28

## 2018-06-28 NOTE — TELEPHONE ENCOUNTER
Pt calling stating she was unable to take full MRI yesterday d/t not being able to lay flat for an extended period. Requesting medication to help with pain/laying flat. KERRIE Neil> Ada, rx written for Norco, placed at  on 2nd floor [facility].  Left me

## 2018-07-05 ENCOUNTER — HOSPITAL ENCOUNTER (OUTPATIENT)
Dept: MRI IMAGING | Facility: HOSPITAL | Age: 83
Discharge: HOME | End: 2018-07-05
Attending: INTERNAL MEDICINE
Payer: MEDICARE

## 2018-07-05 PROCEDURE — 72195 MRI PELVIS W/O DYE: CPT | Performed by: INTERNAL MEDICINE

## 2018-07-12 ENCOUNTER — OFFICE VISIT (OUTPATIENT)
Dept: HEMATOLOGY/ONCOLOGY | Age: 83
End: 2018-07-12
Attending: INTERNAL MEDICINE
Payer: MEDICARE

## 2018-07-12 VITALS
BODY MASS INDEX: 18 KG/M2 | RESPIRATION RATE: 16 BRPM | HEART RATE: 60 BPM | TEMPERATURE: 97 F | SYSTOLIC BLOOD PRESSURE: 100 MMHG | WEIGHT: 109 LBS | OXYGEN SATURATION: 98 % | DIASTOLIC BLOOD PRESSURE: 61 MMHG

## 2018-07-12 DIAGNOSIS — M80.00XD AGE-RELATED OSTEOPOROSIS WITH CURRENT PATHOLOGICAL FRACTURE WITH ROUTINE HEALING, SUBSEQUENT ENCOUNTER: ICD-10-CM

## 2018-07-12 DIAGNOSIS — R29.890 HEIGHT LOSS: ICD-10-CM

## 2018-07-12 DIAGNOSIS — N18.4 ANEMIA IN STAGE 4 CHRONIC KIDNEY DISEASE (HCC): Primary | ICD-10-CM

## 2018-07-12 DIAGNOSIS — D50.0 IRON DEFICIENCY ANEMIA DUE TO CHRONIC BLOOD LOSS: Primary | ICD-10-CM

## 2018-07-12 DIAGNOSIS — D63.1 ANEMIA IN STAGE 4 CHRONIC KIDNEY DISEASE (HCC): Primary | ICD-10-CM

## 2018-07-12 DIAGNOSIS — C20 RECTAL CANCER (HCC): ICD-10-CM

## 2018-07-12 DIAGNOSIS — N18.4 CKD (CHRONIC KIDNEY DISEASE) STAGE 4, GFR 15-29 ML/MIN (HCC): ICD-10-CM

## 2018-07-12 DIAGNOSIS — E86.0 DEHYDRATION: ICD-10-CM

## 2018-07-12 LAB
ALBUMIN SERPL-MCNC: 3.7 G/DL (ref 3.5–4.8)
ALP LIVER SERPL-CCNC: 96 U/L (ref 55–142)
ALT SERPL-CCNC: 21 U/L (ref 14–54)
AST SERPL-CCNC: 11 U/L (ref 15–41)
BASOPHILS # BLD AUTO: 0.04 X10(3) UL (ref 0–0.1)
BASOPHILS NFR BLD AUTO: 0.5 %
BILIRUB SERPL-MCNC: 0.9 MG/DL (ref 0.1–2)
BUN BLD-MCNC: 81 MG/DL (ref 8–20)
CALCIUM BLD-MCNC: 7.1 MG/DL (ref 8.3–10.3)
CEA: 2.7 NG/ML (ref 0.5–5)
CHLORIDE: 115 MMOL/L (ref 101–111)
CO2: 11 MMOL/L (ref 22–32)
CREAT BLD-MCNC: 4.32 MG/DL (ref 0.55–1.02)
EOSINOPHIL # BLD AUTO: 0.15 X10(3) UL (ref 0–0.3)
EOSINOPHIL NFR BLD AUTO: 2 %
ERYTHROCYTE [DISTWIDTH] IN BLOOD BY AUTOMATED COUNT: 18.6 % (ref 11.5–16)
GLUCOSE BLD-MCNC: 104 MG/DL (ref 70–99)
HCT VFR BLD AUTO: 30.2 % (ref 34–50)
HGB BLD-MCNC: 10 G/DL (ref 12–16)
IMMATURE GRANULOCYTE COUNT: 0.04 X10(3) UL (ref 0–1)
IMMATURE GRANULOCYTE RATIO %: 0.5 %
LYMPHOCYTES # BLD AUTO: 0.68 X10(3) UL (ref 0.9–4)
LYMPHOCYTES NFR BLD AUTO: 9.2 %
M PROTEIN MFR SERPL ELPH: 7.2 G/DL (ref 6.1–8.3)
MCH RBC QN AUTO: 21.9 PG (ref 27–33.2)
MCHC RBC AUTO-ENTMCNC: 33.1 G/DL (ref 31–37)
MCV RBC AUTO: 66.2 FL (ref 81–100)
MONOCYTES # BLD AUTO: 0.59 X10(3) UL (ref 0.1–1)
MONOCYTES NFR BLD AUTO: 8 %
NEUTROPHIL ABS PRELIM: 5.87 X10 (3) UL (ref 1.3–6.7)
NEUTROPHILS # BLD AUTO: 5.87 X10(3) UL (ref 1.3–6.7)
NEUTROPHILS NFR BLD AUTO: 79.8 %
PLATELET # BLD AUTO: 192 10(3)UL (ref 150–450)
PLATELET MORPHOLOGY: NORMAL
POTASSIUM SERPL-SCNC: 4.8 MMOL/L (ref 3.6–5.1)
RBC # BLD AUTO: 4.56 X10(6)UL (ref 3.8–5.1)
RED CELL DISTRIBUTION WIDTH-SD: 42.7 FL (ref 35.1–46.3)
SODIUM SERPL-SCNC: 138 MMOL/L (ref 136–144)
WBC # BLD AUTO: 7.4 X10(3) UL (ref 4–13)

## 2018-07-12 PROCEDURE — 85025 COMPLETE CBC W/AUTO DIFF WBC: CPT

## 2018-07-12 PROCEDURE — 99214 OFFICE O/P EST MOD 30 MIN: CPT | Performed by: INTERNAL MEDICINE

## 2018-07-12 PROCEDURE — 36415 COLL VENOUS BLD VENIPUNCTURE: CPT

## 2018-07-12 RX ORDER — MIRTAZAPINE 7.5 MG/1
7.5 TABLET, FILM COATED ORAL NIGHTLY
Qty: 30 TABLET | Refills: 5 | Status: SHIPPED | OUTPATIENT
Start: 2018-07-12 | End: 2018-08-16

## 2018-07-12 NOTE — PROGRESS NOTES
Pt here for 3 month MD f/u. Energy level has been low, appetite is low, if she forces herself to eat, she feels like she needs to have a bowel movement. Pt using Norco for hip pain to help with PT. Pt had MRI 7/5.  Pt notes vision changes, is planning on se

## 2018-07-16 NOTE — PROGRESS NOTES
[facility]    PATIENT'S NAME: Sierrakobe Reis [place] KOBE   ATTENDING PHYSICIAN: Alessio Diaz M.D.    PATIENT ACCOUNT #: [de-identified] LOCATION: [address] [place] [address] RECORD #: [id number] YOB: 1934   DATE OF SERVICE: 07/12/2018       CANCER showed that her hemoglobin had bumped up over 10. She is still living alone and managing to get by but struggling a bit.   Current medications include Tylenol p.r.n., vitamin D 1000 units daily, Lomotil p.r.n., folic acid 1 mg daily, hydrocodone 10/325 one of her pain. Some of her pelvic ring fractures may be related to the prior radiation. Having said this, she certainly is not likely to be cured by that treatment and is likely to eventually have disease progression in this site.   She is not anxious to th

## 2018-07-21 ENCOUNTER — TELEPHONE (OUTPATIENT)
Dept: NEPHROLOGY | Facility: CLINIC | Age: 83
End: 2018-07-21

## 2018-07-21 DIAGNOSIS — N17.9 AKI (ACUTE KIDNEY INJURY) (HCC): ICD-10-CM

## 2018-07-21 DIAGNOSIS — N18.4 CKD (CHRONIC KIDNEY DISEASE) STAGE 4, GFR 15-29 ML/MIN (HCC): Primary | ICD-10-CM

## 2018-07-26 ENCOUNTER — HOSPITAL ENCOUNTER (INPATIENT)
Facility: HOSPITAL | Age: 83
LOS: 5 days | Discharge: NURSING, REHAB OR LONG-TERM CARE | DRG: 682 | End: 2018-07-31
Attending: INTERNAL MEDICINE | Admitting: INTERNAL MEDICINE
Payer: MEDICARE

## 2018-07-26 ENCOUNTER — OFFICE VISIT (OUTPATIENT)
Dept: HEMATOLOGY/ONCOLOGY | Age: 83
End: 2018-07-26
Attending: INTERNAL MEDICINE
Payer: MEDICARE

## 2018-07-26 DIAGNOSIS — N18.4 CKD (CHRONIC KIDNEY DISEASE) STAGE 4, GFR 15-29 ML/MIN (HCC): ICD-10-CM

## 2018-07-26 DIAGNOSIS — D63.1 ANEMIA IN STAGE 4 CHRONIC KIDNEY DISEASE (HCC): Primary | ICD-10-CM

## 2018-07-26 DIAGNOSIS — R29.890 HEIGHT LOSS: ICD-10-CM

## 2018-07-26 DIAGNOSIS — N17.9 AKI (ACUTE KIDNEY INJURY) (HCC): ICD-10-CM

## 2018-07-26 DIAGNOSIS — N18.4 ANEMIA IN STAGE 4 CHRONIC KIDNEY DISEASE (HCC): Primary | ICD-10-CM

## 2018-07-26 DIAGNOSIS — C20 RECTAL CANCER (HCC): Primary | ICD-10-CM

## 2018-07-26 DIAGNOSIS — E86.0 DEHYDRATION: ICD-10-CM

## 2018-07-26 DIAGNOSIS — D50.0 IRON DEFICIENCY ANEMIA DUE TO CHRONIC BLOOD LOSS: ICD-10-CM

## 2018-07-26 LAB
ANION GAP SERPL CALC-SCNC: 13 MMOL/L (ref 0–18)
ANION GAP SERPL CALC-SCNC: 15 MMOL/L (ref 0–18)
BASOPHILS # BLD AUTO: 0.04 X10(3) UL (ref 0–0.1)
BASOPHILS NFR BLD AUTO: 0.6 %
BUN BLD-MCNC: 71 MG/DL (ref 8–20)
BUN BLD-MCNC: 72 MG/DL (ref 8–20)
BUN/CREAT SERPL: 15.2 (ref 10–20)
BUN/CREAT SERPL: 16.7 (ref 10–20)
CALCIUM BLD-MCNC: 6.8 MG/DL (ref 8.3–10.3)
CALCIUM BLD-MCNC: 6.8 MG/DL (ref 8.3–10.3)
CHLORIDE SERPL-SCNC: 114 MMOL/L (ref 101–111)
CHLORIDE SERPL-SCNC: 115 MMOL/L (ref 101–111)
CO2 SERPL-SCNC: 7 MMOL/L (ref 22–32)
CO2 SERPL-SCNC: 9 MMOL/L (ref 22–32)
CREAT BLD-MCNC: 4.32 MG/DL (ref 0.55–1.02)
CREAT BLD-MCNC: 4.66 MG/DL (ref 0.55–1.02)
EOSINOPHIL # BLD AUTO: 0.16 X10(3) UL (ref 0–0.3)
EOSINOPHIL NFR BLD AUTO: 2.3 %
ERYTHROCYTE [DISTWIDTH] IN BLOOD BY AUTOMATED COUNT: 18.6 % (ref 11.5–16)
GLUCOSE BLD-MCNC: 86 MG/DL (ref 70–99)
GLUCOSE BLD-MCNC: 98 MG/DL (ref 70–99)
HCT VFR BLD AUTO: 25.6 % (ref 34–50)
HGB BLD-MCNC: 8.5 G/DL (ref 12–16)
IMMATURE GRANULOCYTE COUNT: 0.03 X10(3) UL (ref 0–1)
IMMATURE GRANULOCYTE RATIO %: 0.4 %
LYMPHOCYTES # BLD AUTO: 0.61 X10(3) UL (ref 0.9–4)
LYMPHOCYTES NFR BLD AUTO: 8.8 %
MCH RBC QN AUTO: 21.7 PG (ref 27–33.2)
MCHC RBC AUTO-ENTMCNC: 33.2 G/DL (ref 31–37)
MCV RBC AUTO: 65.3 FL (ref 81–100)
MONOCYTES # BLD AUTO: 0.58 X10(3) UL (ref 0.1–1)
MONOCYTES NFR BLD AUTO: 8.4 %
NEUTROPHIL ABS PRELIM: 5.49 X10 (3) UL (ref 1.3–6.7)
NEUTROPHILS # BLD AUTO: 5.49 X10(3) UL (ref 1.3–6.7)
NEUTROPHILS NFR BLD AUTO: 79.5 %
OSMOLALITY SERPL CALC.SUM OF ELEC: 303 MOSM/KG (ref 275–295)
OSMOLALITY SERPL CALC.SUM OF ELEC: 304 MOSM/KG (ref 275–295)
PLATELET # BLD AUTO: 212 10(3)UL (ref 150–450)
POTASSIUM SERPL-SCNC: 5.2 MMOL/L (ref 3.6–5.1)
POTASSIUM SERPL-SCNC: 5.3 MMOL/L (ref 3.6–5.1)
RBC # BLD AUTO: 3.92 X10(6)UL (ref 3.8–5.1)
RED CELL DISTRIBUTION WIDTH-SD: 42.7 FL (ref 35.1–46.3)
SODIUM SERPL-SCNC: 136 MMOL/L (ref 136–144)
SODIUM SERPL-SCNC: 137 MMOL/L (ref 136–144)
WBC # BLD AUTO: 6.9 X10(3) UL (ref 4–13)

## 2018-07-26 PROCEDURE — 85025 COMPLETE CBC W/AUTO DIFF WBC: CPT

## 2018-07-26 PROCEDURE — 36415 COLL VENOUS BLD VENIPUNCTURE: CPT

## 2018-07-26 PROCEDURE — 80048 BASIC METABOLIC PNL TOTAL CA: CPT

## 2018-07-26 PROCEDURE — 96372 THER/PROPH/DIAG INJ SC/IM: CPT

## 2018-07-26 RX ORDER — MAGNESIUM HYDROXIDE/ALUMINUM HYDROXICE/SIMETHICONE 120; 1200; 1200 MG/30ML; MG/30ML; MG/30ML
30 SUSPENSION ORAL 4 TIMES DAILY PRN
Status: DISCONTINUED | OUTPATIENT
Start: 2018-07-26 | End: 2018-07-26

## 2018-07-27 ENCOUNTER — HOSPITAL ENCOUNTER (EMERGENCY)
Facility: HOSPITAL | Age: 83
Discharge: STILL ADMITTED OR ADMINISTRATIVE | End: 2018-07-30
Payer: MEDICARE

## 2018-07-27 LAB
ANION GAP SERPL CALC-SCNC: 13 MMOL/L (ref 0–18)
ANTIBODY SCREEN: NEGATIVE
BASOPHILS # BLD AUTO: 0.02 X10(3) UL (ref 0–0.1)
BASOPHILS NFR BLD AUTO: 0.5 %
BUN BLD-MCNC: 67 MG/DL (ref 8–20)
BUN/CREAT SERPL: 15.6 (ref 10–20)
CALCIUM BLD-MCNC: 6.2 MG/DL (ref 8.3–10.3)
CHLORIDE SERPL-SCNC: 109 MMOL/L (ref 101–111)
CO2 SERPL-SCNC: 16 MMOL/L (ref 22–32)
CREAT BLD-MCNC: 4.3 MG/DL (ref 0.55–1.02)
DEPRECATED HBV CORE AB SER IA-ACNC: 721.2 NG/ML (ref 18–340)
EOSINOPHIL # BLD AUTO: 0.12 X10(3) UL (ref 0–0.3)
EOSINOPHIL NFR BLD AUTO: 2.8 %
ERYTHROCYTE [DISTWIDTH] IN BLOOD BY AUTOMATED COUNT: 17.9 % (ref 11.5–16)
GLUCOSE BLD-MCNC: 110 MG/DL (ref 70–99)
HCT VFR BLD AUTO: 19.7 % (ref 34–50)
HGB BLD-MCNC: 6.7 G/DL (ref 12–16)
IMMATURE GRANULOCYTE COUNT: 0.04 X10(3) UL (ref 0–1)
IMMATURE GRANULOCYTE RATIO %: 0.9 %
IRON SATURATION: 36 % (ref 15–50)
IRON: 78 UG/DL (ref 28–170)
LDH: 116 U/L (ref 84–246)
LYMPHOCYTES # BLD AUTO: 0.55 X10(3) UL (ref 0.9–4)
LYMPHOCYTES NFR BLD AUTO: 12.8 %
MCH RBC QN AUTO: 22.2 PG (ref 27–33.2)
MCHC RBC AUTO-ENTMCNC: 34 G/DL (ref 31–37)
MCV RBC AUTO: 65.2 FL (ref 81–100)
MONOCYTES # BLD AUTO: 0.58 X10(3) UL (ref 0.1–1)
MONOCYTES NFR BLD AUTO: 13.5 %
NEUTROPHIL ABS PRELIM: 3 X10 (3) UL (ref 1.3–6.7)
NEUTROPHILS # BLD AUTO: 3 X10(3) UL (ref 1.3–6.7)
NEUTROPHILS NFR BLD AUTO: 69.5 %
OSMOLALITY SERPL CALC.SUM OF ELEC: 306 MOSM/KG (ref 275–295)
PLATELET # BLD AUTO: 141 10(3)UL (ref 150–450)
POTASSIUM SERPL-SCNC: 3.7 MMOL/L (ref 3.6–5.1)
RBC # BLD AUTO: 3.02 X10(6)UL (ref 3.8–5.1)
RED CELL DISTRIBUTION WIDTH-SD: 41.5 FL (ref 35.1–46.3)
RETIC ABS CALC AUTO: 38.1 X10(3) UL (ref 22.5–147.5)
RETIC IRF CALC: 0.14 RATIO (ref 0.09–0.3)
RETIC%: 1.2 % (ref 0.5–2.5)
RETICULOCYTE HEMOGLOBIN EQUIVALENT: 21 PG (ref 28.2–36.3)
RH BLOOD TYPE: POSITIVE
SODIUM SERPL-SCNC: 138 MMOL/L (ref 136–144)
TOTAL IRON BINDING CAPACITY: 218 UG/DL (ref 240–450)
TRANSFERRIN SERPL-MCNC: 146 MG/DL (ref 200–360)
WBC # BLD AUTO: 4.3 X10(3) UL (ref 4–13)

## 2018-07-27 PROCEDURE — 30233N1 TRANSFUSION OF NONAUTOLOGOUS RED BLOOD CELLS INTO PERIPHERAL VEIN, PERCUTANEOUS APPROACH: ICD-10-PCS | Performed by: INTERNAL MEDICINE

## 2018-07-27 PROCEDURE — 99223 1ST HOSP IP/OBS HIGH 75: CPT | Performed by: INTERNAL MEDICINE

## 2018-07-27 RX ORDER — DIPHENOXYLATE HYDROCHLORIDE AND ATROPINE SULFATE 2.5; .025 MG/1; MG/1
1 TABLET ORAL 4 TIMES DAILY PRN
Status: DISCONTINUED | OUTPATIENT
Start: 2018-07-27 | End: 2018-07-31

## 2018-07-27 RX ORDER — FOLIC ACID 1 MG/1
1 TABLET ORAL DAILY
Status: DISCONTINUED | OUTPATIENT
Start: 2018-07-27 | End: 2018-07-31

## 2018-07-27 RX ORDER — ACETAMINOPHEN 500 MG
1000 TABLET ORAL EVERY 6 HOURS PRN
Status: DISCONTINUED | OUTPATIENT
Start: 2018-07-27 | End: 2018-07-31

## 2018-07-27 RX ORDER — MIRTAZAPINE 15 MG/1
7.5 TABLET, FILM COATED ORAL NIGHTLY
Status: DISCONTINUED | OUTPATIENT
Start: 2018-07-27 | End: 2018-07-27

## 2018-07-27 RX ORDER — HYDROCODONE BITARTRATE AND ACETAMINOPHEN 10; 325 MG/1; MG/1
1-2 TABLET ORAL EVERY 4 HOURS PRN
Status: DISCONTINUED | OUTPATIENT
Start: 2018-07-27 | End: 2018-07-31

## 2018-07-27 RX ORDER — SODIUM CHLORIDE 9 MG/ML
INJECTION, SOLUTION INTRAVENOUS ONCE
Status: COMPLETED | OUTPATIENT
Start: 2018-07-27 | End: 2018-07-27

## 2018-07-27 RX ORDER — ONDANSETRON 2 MG/ML
4 INJECTION INTRAMUSCULAR; INTRAVENOUS EVERY 6 HOURS PRN
Status: DISCONTINUED | OUTPATIENT
Start: 2018-07-27 | End: 2018-07-31

## 2018-07-27 RX ORDER — DOXEPIN HYDROCHLORIDE 50 MG/1
1 CAPSULE ORAL DAILY
Status: DISCONTINUED | OUTPATIENT
Start: 2018-07-27 | End: 2018-07-31

## 2018-07-27 RX ORDER — VITS A,C,E/LUTEIN/MINERALS 300MCG-200
1 TABLET ORAL DAILY
Status: DISCONTINUED | OUTPATIENT
Start: 2018-07-27 | End: 2018-07-31

## 2018-07-27 NOTE — CONSULTS
Hem/Onc Report of Consultation    Patient Name: Gonzalez Rhoades   YOB: 1934   Medical Record Number: [id number]   CSN: [id number]   Consulting Physician: Dr Elidia Sparks   Referring Physician(s): Dr. Akhil Cyr   Date of Consultation: 7/27/2018     Re pressure    • History of blood transfusion    • IBS (irritable bowel syndrome)    • Insomnia    • KIDNEY STONE    • Macular degeneration    • Migraines     as a child   • Osteoarthrosis, unspecified whether generalized or localized, unspecified site    • O Gyne History:  Obstetric History     T0    L0    SAB0  TAB0  Ectopic0  Multiple0  Live Births0       Psychosocial History:    Social History  Social History   Marital status:   Spouse name: N/A    Years of education: N/A  Number o Intravenous Once   • cholecalciferol  1,000 Units Oral Daily   • folic acid  1 mg Oral Daily   • mirtazapine  7.5 mg Oral Nightly   • multivitamin  1 tablet Oral Daily   • OCUVITE-LUTEIN  1 tablet Oral Daily       Home Medications:    Current Facility-Admi Examination:  General: Patient is alert and oriented x 3, not in acute distress. HEENT: EOMs intact. PERRL. Oropharynx is clear. Neck: No JVD. No palpable lymphadenopathy. Neck is supple. Chest: Clear to auscultation. Heart: Regular rate and rhythm. (L) 81.0 - 100.0 fL   MCH 22.2 (L) 27.0 - 33.2 pg   MCHC 34.0 31.0 - 37.0 g/dL   RDW 17.9 (H) 11.5 - 16.0 %   RDW-SD 41.5 35.1 - 46.3 fL   Neutrophil Absolute Prelim 3.00 1.30 - 6.70 x10 (3) uL   Neutrophil Absolute 3.00 1.30 - 6.70 x10(3) uL   Lymphocyte completed 6/21/17, recent MRI with residual/recurrent disease \  2. TANIA: per Dr Claudio Magana  3. Anemia: Epo 40,000units, transfuse 1 unit PRBC's  4. Metabolic Acidosis: per Dr Claudio Magana   5. Bilateral sacral / pubic fractures noted on recent MRI: ortho to see  6.  Decr

## 2018-07-27 NOTE — CONSULTS
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 Thank you for allowing me to participate in the care of your patient.     Amari Corey  7/27/2018  9:13 AM

## 2018-07-27 NOTE — PLAN OF CARE
Hematologic    • Interventions for Selected Goals Progressing        HGB = 6.7, DR MARLIN clinton in , DR Win Mensah in .  Will recv blood products today, NA [id number] mcg  IV infusing at prescribd rate, medicatd for c/o discomfort , followed by nausea / emesis , appeti

## 2018-07-27 NOTE — PLAN OF CARE
NURSING ADMISSION NOTE      Patient admitted via wheelchair. Oriented to room. Safety precautions initiated. Bed in low position. Call light in reach. Patient a direct admit, here due to Renal Failure, patient of Dr. Casandra Barker.  Received admitting order

## 2018-07-27 NOTE — DIETARY MALNUTRITION NOTE
[facility]    NUTRITION INITIAL ASSESSMENT    Pt meets severe malnutrition criteria.     CRITERIA FOR MALNUTRITION DIAGNOSIS:  Criteria for severe malnutrition diagnosis: chronic illness related to wt loss greater than 7.5% in 3 months, energy intake l oz). This is 90% of IBW  BMI: Body mass index is 18.22 kg/m².   IBW: 54.5 kg    WEIGHT HISTORY:   Wt Readings from Last 12 Encounters:  07/27/18 : 48.9 kg (107 lb 12.9 oz)  07/12/18 : 49.4 kg (109 lb)  05/10/18 : 52.3 kg (115 lb 3.2 oz)  04/12/18 : 55.5 kg

## 2018-07-27 NOTE — H&P
[facility] Hospitalist H&P       CC: nausea     PCP: Reva Vaca MD    History of Present Illness: Patient is a 80year old female with PMH sig for Crohn's disease, fibromyalgia, HTN, HL, rectal carcinoma, sacral and pelvic fractures and TANIA with chronic meta • Unspecified essential hypertension    • Unspecified sleep apnea psg 8/5/13 [place] 8-27-13    AHI 52/ supine 101/ sao2 88%  CPAP 11 [employer]   • Visual impairment    • Vulvar cancer (HCC)         PSH  Past Surgical History:  No date: APPENDECTOMY  No date: A Disp:  Rfl:    Multiple Vitamins-Minerals (MULTIVITAMIN ADULTS OR) Take by mouth daily.  Disp:  Rfl:          Soc Hx     Smoking status: Former Smoker  1.00 Packs/day  For 25.00 Years     Types: Cigarettes    Quit date: 1/19/1975    Smokeless tobacco: Never texture, turgor normal. No rashes or lesions.     Neurologic: Normal strength, no focal deficit appreciated     Diagnostic Data:    CBC/Chem  Recent Labs   Lab  07/26/18   1105  07/27/18   0555   WBC  6.9  4.3   HGB  8.5*  6.7*   MCV  65.3*  65.2*   PLT  21 increase in size along with mild internal calcifications. There appears to be fistulous communication with the anus which was previously described as well. There is mild thickening of the anal rectal wall without evidence of acute inflammatory change.   A related to above  -seems improved already    Sacral and pelvic fractures  -ortho has been consulted by Dr. Tommie Calix due to increased pain and worsening ability to ambulate  -may need repeat imaging - will defer to ortho  -PT/OT eval  -cont with norco for pain

## 2018-07-28 ENCOUNTER — MED REC SCAN ONLY (OUTPATIENT)
Dept: HEMATOLOGY/ONCOLOGY | Facility: HOSPITAL | Age: 83
End: 2018-07-28

## 2018-07-28 PROBLEM — R52 INTRACTABLE PAIN: Status: ACTIVE | Noted: 2018-07-28

## 2018-07-28 PROBLEM — R10.2 PELVIC PAIN: Status: ACTIVE | Noted: 2018-07-28

## 2018-07-28 PROBLEM — R11.0 NAUSEA: Status: ACTIVE | Noted: 2018-07-28

## 2018-07-28 PROBLEM — D64.9 ANEMIA, UNSPECIFIED: Status: ACTIVE | Noted: 2018-07-28

## 2018-07-28 LAB
ANION GAP SERPL CALC-SCNC: 11 MMOL/L (ref 0–18)
BASOPHILS # BLD AUTO: 0 X10(3) UL (ref 0–0.1)
BASOPHILS NFR BLD AUTO: 0 %
BLOOD TYPE BARCODE: 9500
BUN BLD-MCNC: 50 MG/DL (ref 8–20)
BUN/CREAT SERPL: 13.9 (ref 10–20)
CALCIUM BLD-MCNC: 5.9 MG/DL (ref 8.3–10.3)
CHLORIDE SERPL-SCNC: 101 MMOL/L (ref 101–111)
CO2 SERPL-SCNC: 27 MMOL/L (ref 22–32)
CREAT BLD-MCNC: 3.61 MG/DL (ref 0.55–1.02)
EOSINOPHIL # BLD AUTO: 0.05 X10(3) UL (ref 0–0.3)
EOSINOPHIL NFR BLD AUTO: 1 %
ERYTHROCYTE [DISTWIDTH] IN BLOOD BY AUTOMATED COUNT: 20.4 % (ref 11.5–16)
GLUCOSE BLD-MCNC: 124 MG/DL (ref 70–99)
HAV IGM SER QL: 0.9 MG/DL (ref 1.8–2.5)
HCT VFR BLD AUTO: 25.7 % (ref 34–50)
HGB BLD-MCNC: 9 G/DL (ref 12–16)
IMMATURE GRANULOCYTE COUNT: 0.03 X10(3) UL (ref 0–1)
IMMATURE GRANULOCYTE RATIO %: 0.6 %
LYMPHOCYTES # BLD AUTO: 0.4 X10(3) UL (ref 0.9–4)
LYMPHOCYTES NFR BLD AUTO: 8.2 %
MCH RBC QN AUTO: 23.6 PG (ref 27–33.2)
MCHC RBC AUTO-ENTMCNC: 35 G/DL (ref 31–37)
MCV RBC AUTO: 67.5 FL (ref 81–100)
MONOCYTES # BLD AUTO: 0.49 X10(3) UL (ref 0.1–1)
MONOCYTES NFR BLD AUTO: 10 %
NEUTROPHIL ABS PRELIM: 3.91 X10 (3) UL (ref 1.3–6.7)
NEUTROPHILS # BLD AUTO: 3.91 X10(3) UL (ref 1.3–6.7)
NEUTROPHILS NFR BLD AUTO: 80.2 %
OSMOLALITY SERPL CALC.SUM OF ELEC: 303 MOSM/KG (ref 275–295)
PHOSPHATE SERPL-MCNC: 3.1 MG/DL (ref 2.5–4.9)
PLATELET # BLD AUTO: 137 10(3)UL (ref 150–450)
POTASSIUM SERPL-SCNC: 3.4 MMOL/L (ref 3.6–5.1)
RBC # BLD AUTO: 3.81 X10(6)UL (ref 3.8–5.1)
RED CELL DISTRIBUTION WIDTH-SD: 47.5 FL (ref 35.1–46.3)
SODIUM SERPL-SCNC: 139 MMOL/L (ref 136–144)
WBC # BLD AUTO: 4.9 X10(3) UL (ref 4–13)

## 2018-07-28 PROCEDURE — 99232 SBSQ HOSP IP/OBS MODERATE 35: CPT | Performed by: INTERNAL MEDICINE

## 2018-07-28 PROCEDURE — 99233 SBSQ HOSP IP/OBS HIGH 50: CPT | Performed by: INTERNAL MEDICINE

## 2018-07-28 RX ORDER — PANTOPRAZOLE SODIUM 40 MG/1
40 TABLET, DELAYED RELEASE ORAL
Status: DISCONTINUED | OUTPATIENT
Start: 2018-07-28 | End: 2018-07-31

## 2018-07-28 NOTE — CONSULTS
[facility]    Report of Consultation    Bossman Vasquez Patient Status:  Inpatient    1934 MRN [id number]   [facility] 4NW-A Attending Fabiano Zuluaga MD   [address] Day # 2 PCP Yun Jarrell MD     Date of Admission:  2018 Unspecified sleep apnea psg 8/5/13 [place] 8-27-13    AHI 52/ supine 101/ sao2 88%  CPAP 11 [employer]   • Visual impairment    • Vulvar cancer [facility])        Past Surgical History  Past Surgical History:  No date: APPENDECTOMY  No date: APPENDECTOMY  No date: COLEC mL IV push 40 mg Intravenous QAM AC   Or      Pantoprazole Sodium (PROTONIX) EC tab 40 mg 40 mg Oral QAM AC   magnesium sulfate IVPB premix 4 g 4 g Intravenous Once   sodium bicarbonate 150 mEq in dextrose 5 % 1,000 mL infusion 150 mEq Intravenous Continuo Comment:Itching  Hydrocodone-Acetami*    DIZZINESS  Msg [Monosodium Glu*        Comment:headache  Nsaids                      Comment:chrohs disease  Pcn [Bicillin L-A]      RASH  Sulfa Antibiotics       RASH    Review of Systems:    EILEEN - miguel comprehens management  - no orthopaedic intervention indicated for known pelvic insufficiency fractures. Can get repeat pelvis XR to see if there is interval change from prior films, but it will not change any current management. These fractures are non-operative.

## 2018-07-28 NOTE — PROGRESS NOTES
[facility] Hospitalist Progress Note                                                                   [address]  7/28/1934    SUBJECTIVE:  Bicarb, renal function improved today.   Feels s 116       No results for input(s): PGLU in the last 168 hours.     Meds:   Scheduled:   • potassium chloride 40mEq IVPB (peripheral line)  40 mEq Intravenous Once   • pantoprazole (PROTONIX) IV push  40 mg Intravenous QAM AC    Or   • Pantoprazole Sodium  4

## 2018-07-28 NOTE — PROGRESS NOTES
Alert,oriented. Patient complained of pain once this shift. One half of   a10 mg Norco given at patient request.IV sodium Bicarb drip at125 ml per hour. Resting comfortably at this time.

## 2018-07-28 NOTE — PROGRESS NOTES
[facility]  Progress Note    Bossman Vasquez Patient Status:  Inpatient    1934 MRN [id number]   [facility] 4NW-A Attending Fabiano Zuluaga MD   [facility] Day # 2 PCP Yun Jarrell MD       SUBJECTIVE:        OBJECTIVE:      acute distress. Chest: Clear to auscultation. Heart: Regular rate and rhythm. Abdomen: Soft, non tender with good bowel sounds. Extremities: No edema. Neurological: Grossly intact.      RADIOLOGY:     ASSESSMENT/PLAN:    # Pelvis fractures: Due to ins

## 2018-07-28 NOTE — PROGRESS NOTES
Dry heaving this am;compazine given;no further complaints of nausea and dryheaving since given compazine at 1130  Dr. Roberts Gaucher of critical lab results;new orders written and administered;   Tolerating;spouse at bedside;aware of new meds ordered

## 2018-07-28 NOTE — PROGRESS NOTES
[facility]  Progress Note    Urvashi  Patient Status:  Inpatient    1934 MRN [id number]   [facility] 4NW-A Attending Kimberyln Ervin MD   Hosp Day # 2 PCP Kamaljit Rojas MD       + nausea  + hip pain w/ movement  Denies Soft, non-tender. + bowel sounds, no palpable organomegaly  Extremities: Without clubbing, cyanosis; no edema  Neurologic: Cranial nerves grossly intact, moving all extremities  Skin: Warm and dry, no rashes      Recent Labs   07/26/18  1105 07/27/18  0549

## 2018-07-29 LAB
ANION GAP SERPL CALC-SCNC: 6 MMOL/L (ref 0–18)
BUN BLD-MCNC: 37 MG/DL (ref 8–20)
BUN/CREAT SERPL: 11.9 (ref 10–20)
CALCIUM BLD-MCNC: 6.1 MG/DL (ref 8.3–10.3)
CHLORIDE SERPL-SCNC: 99 MMOL/L (ref 101–111)
CO2 SERPL-SCNC: 36 MMOL/L (ref 22–32)
CREAT BLD-MCNC: 3.11 MG/DL (ref 0.55–1.02)
GLUCOSE BLD-MCNC: 127 MG/DL (ref 70–99)
HAV IGM SER QL: 2.1 MG/DL (ref 1.8–2.5)
OSMOLALITY SERPL CALC.SUM OF ELEC: 302 MOSM/KG (ref 275–295)
PHOSPHATE SERPL-MCNC: 2.6 MG/DL (ref 2.5–4.9)
POTASSIUM SERPL-SCNC: 3.1 MMOL/L (ref 3.6–5.1)
PTH-INTACT SERPL-MCNC: 1476.6 PG/ML (ref 11.1–79.5)
SODIUM SERPL-SCNC: 141 MMOL/L (ref 136–144)
VIT D+METAB SERPL-MCNC: 14.8 NG/ML (ref 30–100)

## 2018-07-29 PROCEDURE — 99232 SBSQ HOSP IP/OBS MODERATE 35: CPT | Performed by: INTERNAL MEDICINE

## 2018-07-29 PROCEDURE — 99233 SBSQ HOSP IP/OBS HIGH 50: CPT | Performed by: INTERNAL MEDICINE

## 2018-07-29 RX ORDER — CALCITRIOL 0.25 UG/1
0.25 CAPSULE, LIQUID FILLED ORAL DAILY
Status: DISCONTINUED | OUTPATIENT
Start: 2018-07-29 | End: 2018-07-31

## 2018-07-29 RX ORDER — POTASSIUM CHLORIDE 14.9 MG/ML
20 INJECTION INTRAVENOUS ONCE
Status: COMPLETED | OUTPATIENT
Start: 2018-07-29 | End: 2018-07-29

## 2018-07-29 NOTE — PROGRESS NOTES
Feeling a little better,smiling and told MD would like to eat;PCT ordering for her now  Still refusing all am meds; informed Dr. Joseph House on rounds   rounding with new orders  Denies pain et time  VivianPT  worked with pt.today;intially Kori Osman

## 2018-07-29 NOTE — PROGRESS NOTES
[facility] Hospitalist Progress Note                                                                   [address]  7/28/1934    S: Pt still feels nauseous, she is trying to eat a saltene ondansetron HCl    Assessment/Plan:  Patient is a 80year old female with PMH sig for Crohn's disease, fibromyalgia, HTN, HL, rectal carcinoma, sacral and pelvic fractures and TANIA with chronic metabolic acidosis is now admitted per Dr. Zbigniew Barron since her outpt

## 2018-07-29 NOTE — PROGRESS NOTES
[facility]  Progress Note    Chirag Rico Patient Status:  Inpatient    1934 MRN [id number]   [facility] 4NW-A Attending Manohar Lassiter MD   Hosp Day # 3 PCP Elisabeth Esteban MD       SUBJECTIVE:    Pain is but better today. EXAM:    General: Patient is alert and oriented x 3, not in acute distress. Chest: Clear to auscultation. Heart: Regular rate and rhythm. Abdomen: Soft, non tender with good bowel sounds. Extremities: No edema. Neurological: Grossly intact.      RADIO

## 2018-07-29 NOTE — PHYSICAL THERAPY NOTE
PHYSICAL THERAPY EVALUATION - INPATIENT     Room Number: 416/416-A  Evaluation Date: 7/29/2018  Type of Evaluation: Initial  Physician Order: PT Eval and Treat    Presenting Problem: increasing pelvic pain causing increasing difficulty to walk, and wor disease [facility])      Past Medical History  Past Medical History:   Diagnosis Date   • ALLERGIC RHINITIS    • ANXIETY    • Anxiety state    • Autoimmune disease ([facility]    • Blood disorder    • Cancer ([facility]     vulva 14 years ago, tx with surgery alone.    •  on main level  Stairs to Enter : 2     Stairs to [employer]: 0       Lives With: Alone (however,  () has been helping. )     Patient Owned Equipment: Rolling walker;Cane  Patient Regularly Uses: None    Prior Level of Reubens: Pt reports rate in sitting. Biggest problem is nausea. Management Techniques: Repositioning; Body mechanics; Other (Comment) (balanced exercises with maximize of function)    COGNITION  · Overall Cognitive Status:  WFL - within functional limits although she is over Mobility  Rolling to the Right = mod indep  Rolling to the Left = mod indep  Supine to Sit = min assist  Sit to supine = min assist    Transfers  Sit to stand = NT  Stand to sit = NT    Scooting along EOB = mod indep.         Skilled Therapy Provided: Above care, explaining that I wasn't recommending hospice care. Pt and  seem open to this.   So I requested from nursing, Edmund Sánchez, that the hospitalist be contacted regarding the referral. The discussion may be helpful in safely determining an appropriate exertion and exercise due to pelvic insufficiency disease. PLAN  PT Treatment Plan: Bed mobility; Body mechanics; Patient education; Family education;Gait training;Strengthening;Transfer training;Balance training (pain management )  Rehab Potential : Stanley Kolb

## 2018-07-29 NOTE — PROGRESS NOTES
[facility]  Progress Note    Medardo Wilhelm Patient Status:  Inpatient    1934 MRN [id number]   [facility] 4NW-A Attending Christal Mc MD   Hosp Day # 3 PCP Clarence Bianchi MD       + intermittent nausea; no cp/sob  + weakn 115 lb 3.2 oz    General: awake alert  HEENT: No scleral icterus, dry mucous membranes  Neck: Supple, no KIERA or thyromegaly  Cardiac: Regular rate and rhythm, S1, S2 normal, no murmur, rub, or gallop  Lungs: Decreased breath sounds at the bases bilaterally

## 2018-07-29 NOTE — PROGRESS NOTES
Alert and oriented, poor appetite. Reinforced teaching regarding intake and output. C/o generalized weakness,  Per report, patient refused on saving urine and stools for  Measurement earlier, explained to patient its significant for her plan of care.   ON

## 2018-07-29 NOTE — PLAN OF CARE
Patient alert and oriented. Denies any pain. Rested well through night. Refused to take oral Protonix this am. Stated she hasn't been taking any oral medication. IV dose given instead. IVF infusing per MAR.

## 2018-07-30 LAB
ALBUMIN SERPL-MCNC: 2.5 G/DL (ref 3.5–4.8)
ALBUMIN/GLOB SERPL: 0.9 {RATIO} (ref 1–2)
ALP LIVER SERPL-CCNC: 70 U/L (ref 55–142)
ALT SERPL-CCNC: 10 U/L (ref 14–54)
ANION GAP SERPL CALC-SCNC: 5 MMOL/L (ref 0–18)
AST SERPL-CCNC: 12 U/L (ref 15–41)
BASOPHILS # BLD AUTO: 0.02 X10(3) UL (ref 0–0.1)
BASOPHILS NFR BLD AUTO: 0.4 %
BILIRUB SERPL-MCNC: 1.1 MG/DL (ref 0.1–2)
BUN BLD-MCNC: 26 MG/DL (ref 8–20)
BUN/CREAT SERPL: 9.4 (ref 10–20)
CALCIUM BLD-MCNC: 6.5 MG/DL (ref 8.3–10.3)
CHLORIDE SERPL-SCNC: 102 MMOL/L (ref 101–111)
CO2 SERPL-SCNC: 36 MMOL/L (ref 22–32)
CREAT BLD-MCNC: 2.78 MG/DL (ref 0.55–1.02)
EOSINOPHIL # BLD AUTO: 0.11 X10(3) UL (ref 0–0.3)
EOSINOPHIL NFR BLD AUTO: 1.9 %
ERYTHROCYTE [DISTWIDTH] IN BLOOD BY AUTOMATED COUNT: 20.7 % (ref 11.5–16)
GLOBULIN PLAS-MCNC: 2.7 G/DL (ref 2.5–3.7)
GLUCOSE BLD-MCNC: 121 MG/DL (ref 70–99)
HCT VFR BLD AUTO: 27 % (ref 34–50)
HGB BLD-MCNC: 8.8 G/DL (ref 12–16)
IMMATURE GRANULOCYTE COUNT: 0.03 X10(3) UL (ref 0–1)
IMMATURE GRANULOCYTE RATIO %: 0.5 %
LYMPHOCYTES # BLD AUTO: 0.46 X10(3) UL (ref 0.9–4)
LYMPHOCYTES NFR BLD AUTO: 8.1 %
M PROTEIN MFR SERPL ELPH: 5.2 G/DL (ref 6.1–8.3)
MCH RBC QN AUTO: 23 PG (ref 27–33.2)
MCHC RBC AUTO-ENTMCNC: 32.6 G/DL (ref 31–37)
MCV RBC AUTO: 70.7 FL (ref 81–100)
MONOCYTES # BLD AUTO: 0.6 X10(3) UL (ref 0.1–1)
MONOCYTES NFR BLD AUTO: 10.6 %
NEUTROPHIL ABS PRELIM: 4.43 X10 (3) UL (ref 1.3–6.7)
NEUTROPHILS # BLD AUTO: 4.43 X10(3) UL (ref 1.3–6.7)
NEUTROPHILS NFR BLD AUTO: 78.5 %
OSMOLALITY SERPL CALC.SUM OF ELEC: 302 MOSM/KG (ref 275–295)
PLATELET # BLD AUTO: 149 10(3)UL (ref 150–450)
POTASSIUM SERPL-SCNC: 3.3 MMOL/L (ref 3.6–5.1)
RBC # BLD AUTO: 3.82 X10(6)UL (ref 3.8–5.1)
RED CELL DISTRIBUTION WIDTH-SD: 50 FL (ref 35.1–46.3)
SODIUM SERPL-SCNC: 143 MMOL/L (ref 136–144)
WBC # BLD AUTO: 5.7 X10(3) UL (ref 4–13)

## 2018-07-30 PROCEDURE — 99232 SBSQ HOSP IP/OBS MODERATE 35: CPT | Performed by: INTERNAL MEDICINE

## 2018-07-30 PROCEDURE — 99233 SBSQ HOSP IP/OBS HIGH 50: CPT | Performed by: INTERNAL MEDICINE

## 2018-07-30 RX ORDER — POTASSIUM CHLORIDE 29.8 MG/ML
40 INJECTION INTRAVENOUS ONCE
Status: DISCONTINUED | OUTPATIENT
Start: 2018-07-30 | End: 2018-07-30

## 2018-07-30 RX ORDER — SODIUM CHLORIDE 450 MG/100ML
INJECTION, SOLUTION INTRAVENOUS CONTINUOUS
Status: DISCONTINUED | OUTPATIENT
Start: 2018-07-30 | End: 2018-07-31

## 2018-07-30 NOTE — PROGRESS NOTES
[facility] Hospitalist Progress Note                                                                   [facility] KOBE [facility]  7/28/1934    S: Nausea improved, eating a bit more but states food qualit HCl    Assessment/Plan:  Patient is a 80year old female with PMH sig for Crohn's disease, fibromyalgia, HTN, HL, rectal carcinoma, sacral and pelvic fractures and TANIA with chronic metabolic acidosis is now admitted per Dr. Cristal Cyr since her outpt labs were w

## 2018-07-30 NOTE — PROGRESS NOTES
[facility]  Nephrology Progress Note    Chris Santana Attending:  Chuy Galindo,        Assessment and Plan:    1) TANIA- due to volume depletion with chronic modest PO intake + high output ostomy- much improved with IVF; nearing baseline    Value Date   WBC 5.7 07/30/2018   HGB 8.8 07/30/2018   HCT 27.0 07/30/2018   .0 07/30/2018   CREATSERUM 2.78 07/30/2018   BUN 26 07/30/2018    07/30/2018   K 3.3 07/30/2018    07/30/2018   CO2 36.0 07/30/2018    07/30/2018   CA 6.

## 2018-07-30 NOTE — CM/SW NOTE
07/30/18 1100   CM/SW Screening   Referral Source    Information Source Chart review;Nursing rounds   Patient's Mental Status Alert;Oriented   Patient's [address]   Patient lives with Alone   Patient Status Prior to Admission

## 2018-07-30 NOTE — PROGRESS NOTES
Heme/Onc Progress Note    Patient Name: Chirag Rico   YOB: 1934   Medical Record Number: [id number]   CSN: [id number]   Attending Physician: Charleen Romo M.D. Subjective:  Sleepy now. Refused several meds overnight.  Nausea re Examination:  General: Patient is alert and oriented x 3, not in acute distress.   Vital Signs: /58 (BP Location: Right arm)   Pulse 89   Temp 98.5 °F (36.9 °C) (Oral)   Resp 18   Wt 51.4 kg (113 lb 4.5 oz)   SpO2 97%   BMI 19.14 kg/m²   HEENT: EOMs i x10(3) uL   RBC 3.82 3.80 - 5.10 x10(6)uL   HGB 8.8 (L) 12.0 - 16.0 g/dL   HCT 27.0 (L) 34.0 - 50.0 %   .0 (L) 150.0 - 450.0 10(3)uL   MCV 70.7 (L) 81.0 - 100.0 fL   MCH 23.0 (L) 27.0 - 33.2 pg   MCHC 32.6 31.0 - 37.0 g/dL   RDW 20.7 (H) 11.5 - 16. 0 transfusion. Continue to follow. No evidence of active blood loss. Prior pelvic RT probably also a contributor. # Disposition - almost certainly won't be able to live on her own unless there is major improvement. AI to start.     Riky Harrison MD

## 2018-07-30 NOTE — PLAN OF CARE
Hematologic    • Interventions for Selected Goals Not Progressing          Impaired Functional Mobility    • Achieve highest/safest level of mobility/gait Progressing        Only took some bites of her sandwich for dinner, stated that nausea and dry heavin

## 2018-07-30 NOTE — CM/SW NOTE
Followed up with patient as PT angella CLOUD. Patient states that she has been to [facility] in the past and wanted to a referral sent. Referral sent via [facility]. DON requested. / to remain available for support and/or discharge planning.

## 2018-07-30 NOTE — DIETARY MALNUTRITION NOTE
[facility]    NUTRITION FOLLOW UP ASSESSMENT    Pt meets severe malnutrition criteria.     CRITERIA FOR MALNUTRITION DIAGNOSIS:  Criteria for severe malnutrition diagnosis: chronic illness related to wt loss greater than 7.5% in 3 months, energy intake failure. 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 PMH includes CA, HTN, TIA, IBS, GERD. Pt reports typically taking appetite stimulants but stopped taking due to side effects of bad dreams.  When taking appetite stimula prescription  3. No signs of skin breakdown  4.  Maintain lean body mass    MEDICATIONS:  Noted    LABS:  Noted    Pt is at high nutrition risk    FOLLOW-UP DATE: 8/3/18    Maximo Christie MA, RD, LDN  Pager [phone]

## 2018-07-30 NOTE — PHYSICAL THERAPY NOTE
Plan to see pt later in the day. Noting that Dr Rhea Santoro note is suggesting to try AI. Followed up with nursing, Magda, regarding my discussion with nursing, Miladys Sibley, yesterday regarding a goals of care discussion with palliative care.   Not seeing any fur

## 2018-07-30 NOTE — PHYSICAL THERAPY NOTE
PHYSICAL THERAPY TREATMENT NOTE - INPATIENT    Room Number: 451/667-H     Session: 1     Number of Visits to Meet Established Goals: 5    Presenting Problem: increasing pelvic pain causing increasing difficulty to walk, and worsening kidney labs;  Rectal c ALLERGIC RHINITIS    • ANXIETY    • Anxiety state    • Autoimmune disease ([facility]    • Blood disorder    • [facility]     vulva 14 years ago, tx with surgery alone.    • Crohn disease ([facility]    • Crohn's disease ([facility]    • Esophageal reflux    • Fibromyalgia depending on pain. Pt continues to talk to me about her worries around being unable to care for herself or her home going forward. C/o nausea with sitting up and moving, but not as bad today. Patient’s self-stated goal is get better.      OBJECTIVE  Pr Shuffle;R Decreased stance time (kyphotic, )  Stoop/Curb Assistance: Not tested  Comment : na      Bed Mobility  Rolling to the Right = NT  Rolling to the Left = NT  Supine to Sit = HOB elevated  Sit to supine = min assist    Transfers  Sit to stand = min questions and made suggestions for pt regarding organizing her home with assist of her brother, or with a caregiver at home, but definitely someone she trusts.   Particularly since it is unlikely pt will be able to continue to live alone even after 7-10 day recommended for 7-10 days. After this period of rehabilitation patient should achieve mod indep level in transfers and ambulation of 50 ft. Goal of amb may be increased to 100 ft only if pain is controlled to <5/10 (mild to moderate).      PLAN  PT Treatm

## 2018-07-31 VITALS
BODY MASS INDEX: 20 KG/M2 | RESPIRATION RATE: 18 BRPM | OXYGEN SATURATION: 92 % | WEIGHT: 119 LBS | SYSTOLIC BLOOD PRESSURE: 122 MMHG | HEART RATE: 78 BPM | DIASTOLIC BLOOD PRESSURE: 49 MMHG | TEMPERATURE: 98 F

## 2018-07-31 LAB
ANION GAP SERPL CALC-SCNC: 4 MMOL/L (ref 0–18)
BASOPHILS # BLD AUTO: 0.03 X10(3) UL (ref 0–0.1)
BASOPHILS NFR BLD AUTO: 0.5 %
BUN BLD-MCNC: 20 MG/DL (ref 8–20)
BUN/CREAT SERPL: 7.5 (ref 10–20)
CALCIUM BLD-MCNC: 6.9 MG/DL (ref 8.3–10.3)
CHLORIDE SERPL-SCNC: 106 MMOL/L (ref 101–111)
CO2 SERPL-SCNC: 32 MMOL/L (ref 22–32)
CREAT BLD-MCNC: 2.66 MG/DL (ref 0.55–1.02)
EOSINOPHIL # BLD AUTO: 0.2 X10(3) UL (ref 0–0.3)
EOSINOPHIL NFR BLD AUTO: 3.6 %
ERYTHROCYTE [DISTWIDTH] IN BLOOD BY AUTOMATED COUNT: 20.6 % (ref 11.5–16)
GLUCOSE BLD-MCNC: 95 MG/DL (ref 70–99)
HAV IGM SER QL: 1.5 MG/DL (ref 1.8–2.5)
HCT VFR BLD AUTO: 26.6 % (ref 34–50)
HGB BLD-MCNC: 8.5 G/DL (ref 12–16)
IMMATURE GRANULOCYTE COUNT: 0.04 X10(3) UL (ref 0–1)
IMMATURE GRANULOCYTE RATIO %: 0.7 %
LYMPHOCYTES # BLD AUTO: 0.51 X10(3) UL (ref 0.9–4)
LYMPHOCYTES NFR BLD AUTO: 9.2 %
MCH RBC QN AUTO: 23.3 PG (ref 27–33.2)
MCHC RBC AUTO-ENTMCNC: 32 G/DL (ref 31–37)
MCV RBC AUTO: 72.9 FL (ref 81–100)
MONOCYTES # BLD AUTO: 0.59 X10(3) UL (ref 0.1–1)
MONOCYTES NFR BLD AUTO: 10.6 %
NEUTROPHIL ABS PRELIM: 4.17 X10 (3) UL (ref 1.3–6.7)
NEUTROPHILS # BLD AUTO: 4.17 X10(3) UL (ref 1.3–6.7)
NEUTROPHILS NFR BLD AUTO: 75.4 %
OSMOLALITY SERPL CALC.SUM OF ELEC: 296 MOSM/KG (ref 275–295)
PHOSPHATE SERPL-MCNC: 1.5 MG/DL (ref 2.5–4.9)
PLATELET # BLD AUTO: 156 10(3)UL (ref 150–450)
POTASSIUM SERPL-SCNC: 4.3 MMOL/L (ref 3.6–5.1)
RBC # BLD AUTO: 3.65 X10(6)UL (ref 3.8–5.1)
RED CELL DISTRIBUTION WIDTH-SD: 51.9 FL (ref 35.1–46.3)
SODIUM SERPL-SCNC: 142 MMOL/L (ref 136–144)
WBC # BLD AUTO: 5.5 X10(3) UL (ref 4–13)

## 2018-07-31 PROCEDURE — 99232 SBSQ HOSP IP/OBS MODERATE 35: CPT | Performed by: INTERNAL MEDICINE

## 2018-07-31 RX ORDER — MAGNESIUM SULFATE HEPTAHYDRATE 40 MG/ML
2 INJECTION, SOLUTION INTRAVENOUS ONCE
Status: COMPLETED | OUTPATIENT
Start: 2018-07-31 | End: 2018-07-31

## 2018-07-31 RX ORDER — CYANOCOBALAMIN 1000 UG/ML
1000 INJECTION INTRAMUSCULAR; SUBCUTANEOUS ONCE
Status: COMPLETED | OUTPATIENT
Start: 2018-07-31 | End: 2018-07-31

## 2018-07-31 RX ORDER — HYDROCODONE BITARTRATE AND ACETAMINOPHEN 10; 325 MG/1; MG/1
1-2 TABLET ORAL EVERY 4 HOURS PRN
Qty: 40 TABLET | Refills: 0 | Status: SHIPPED | OUTPATIENT
Start: 2018-07-31 | End: 2018-08-01

## 2018-07-31 NOTE — PROGRESS NOTES
[facility] Hospitalist Progress Note                                                                   [address]  7/28/1934    S: pt In good spirits. Denies n/v. Tolerating diet.  No new c HYDROcodone-acetaminophen, ondansetron HCl    Assessment/Plan:  Patient is a 80year old female with PMH sig for Crohn's disease, fibromyalgia, HTN, HL, rectal carcinoma, sacral and pelvic fractures and TANIA with chronic metabolic acidosis is now admitted p

## 2018-07-31 NOTE — PROGRESS NOTES
Infusion of iv electrolyte replacement , completed , discharge documentation completed , report called to facility , discharge summary completed , compiled , a copy generated for pt, discharged to spouse to transport to [facility], per private car

## 2018-07-31 NOTE — PLAN OF CARE
Impaired Functional Mobility    • Achieve highest/safest level of mobility/gait Progressing        PAIN - ADULT    • Verbalizes/displays adequate comfort level or patient's stated pain goal Progressing          Pt A/O x 4. Rm air.  Reports pain to right hip

## 2018-07-31 NOTE — CM/SW NOTE
07/31/18 1400   Discharge disposition   Expected discharge disposition Skilled Nurs   Name of [facility]   Discharge transportation Private car     CM informed that patient is ready for discharge today.  The [facility] are awar

## 2018-07-31 NOTE — PHYSICAL THERAPY NOTE
PHYSICAL THERAPY TREATMENT NOTE - INPATIENT    Room Number: 757/432-E     Session: 1   Number of Visits to Meet Established Goals: 5    Presenting Problem: increasing pelvic pain causing increasing difficulty to walk, and worsening kidney labs;  Rectal can • ALLERGIC RHINITIS    • ANXIETY    • Anxiety state    • Autoimmune disease ([facility]    • Blood disorder    • [facility]     vulva 14 years ago, tx with surgery alone.    • Crohn disease ([facility]    • Crohn's disease ([facility]    • Esophageal reflux    • Fibromya Colostomy    WEIGHT BEARING RESTRICTION  Weight Bearing Restriction: R lower extremity;L lower extremity        R Lower Extremity: Weight Bearing as Tolerated  L Lower Extremity: Weight Bearing as Tolerated    PAIN ASSESSMENT   Ratin  Location: at rest sit in BS chair c CGA. Pt educated on importance of mobility to prevent deconditioning. Pt verbalizes understanding. CM in to discuss transport at d/c. Pt becomes tearful end of session , emotional support provided .  Pt sharing with this writer \" I just demonstrate supine - sit EOB @ level: modified independent from flat bed.       Goal #2 Patient is able to demonstrate transfers Sit to/from Stand at assistance level: modified independent      Goal #3 Patient is able to ambulate 50 feet with assist device:

## 2018-07-31 NOTE — PROGRESS NOTES
[facility]  Nephrology Progress Note    Urvashi Hammond Attending:  Kathleen Sanches, DO       Assessment and Plan:    1) TANIA- due to volume depletion with chronic modest PO intake + high output ostomy- much improved with IVF; nearing baseline.  Co moving all extremities  Skin: Warm and dry, no rashes       Labs:     Lab Results  Component Value Date   WBC 5.5 07/31/2018   HGB 8.5 07/31/2018   HCT 26.6 07/31/2018   .0 07/31/2018   CREATSERUM 2.66 07/31/2018   BUN 20 07/31/2018    07/31/2

## 2018-07-31 NOTE — PLAN OF CARE
Hematologic    • Interventions for Selected Goals Progressing        Impaired Functional Mobility    • Achieve highest/safest level of mobility/gait Progressing        PAIN - ADULT    • Verbalizes/displays adequate comfort level or patient's stated pain go

## 2018-07-31 NOTE — PROGRESS NOTES
Heme/Onc Progress Note    Patient Name: Kathleen Pastor   YOB: 1934   Medical Record Number: [id number]   CSN: [id number]   Attending Physician: Yudy Ulrich M.D. Subjective:  Feels overall a little better. Still getting IVF.   Of Encounters:   •  Darbepoetin [place] (ARANESP) 300 MCG/0.6ML injection 300 mcg, 300 mcg, Subcutaneous, Q30 Days    Physical Examination:  General: Patient is alert and oriented x 3, not in acute distress.   Vital Signs: /49 (BP Location: Right arm)   Pul MCH 23.3 (L) 27.0 - 33.2 pg   MCHC 32.0 31.0 - 37.0 g/dL   RDW 20.6 (H) 11.5 - 16.0 %   RDW-SD 51.9 (H) 35.1 - 46.3 fL   Neutrophil Absolute Prelim 4.17 1.30 - 6.70 x10 (3) uL   Neutrophil Absolute 4.17 1.30 - 6.70 x10(3) uL   Lymphocyte Absolute 0.51 (L certainly won't be able to live on her own unless there is major improvement. AI to start and perhaps caregiver at home. She has the resources.     Eliza Thomas MD  [facility]  [address]

## 2018-07-31 NOTE — CM/SW NOTE
Shun Galvin from [facility] met with patient at bedside. They will continue to follow patient and assist with caregiver needs.     Hannah Woodard MSN RN, CTL/  P: [phone]

## 2018-08-01 ENCOUNTER — SNF VISIT (OUTPATIENT)
Dept: INTERNAL MEDICINE CLINIC | Age: 83
End: 2018-08-01

## 2018-08-01 VITALS
RESPIRATION RATE: 17 BRPM | SYSTOLIC BLOOD PRESSURE: 122 MMHG | OXYGEN SATURATION: 97 % | TEMPERATURE: 98 F | DIASTOLIC BLOOD PRESSURE: 56 MMHG | HEART RATE: 66 BPM

## 2018-08-01 DIAGNOSIS — R53.1 WEAKNESS GENERALIZED: ICD-10-CM

## 2018-08-01 DIAGNOSIS — F41.8 SITUATIONAL ANXIETY: ICD-10-CM

## 2018-08-01 DIAGNOSIS — N17.9 AKI (ACUTE KIDNEY INJURY) (HCC): ICD-10-CM

## 2018-08-01 DIAGNOSIS — N18.4 CKD (CHRONIC KIDNEY DISEASE) STAGE 4, GFR 15-29 ML/MIN (HCC): ICD-10-CM

## 2018-08-01 DIAGNOSIS — R26.2 INABILITY TO WALK: Primary | ICD-10-CM

## 2018-08-01 DIAGNOSIS — D63.8 ANEMIA IN OTHER CHRONIC DISEASES CLASSIFIED ELSEWHERE: ICD-10-CM

## 2018-08-01 DIAGNOSIS — E87.2 METABOLIC ACIDOSIS: ICD-10-CM

## 2018-08-01 DIAGNOSIS — M85.80 OSTEOPENIA, UNSPECIFIED LOCATION: ICD-10-CM

## 2018-08-01 DIAGNOSIS — F51.02 ADJUSTMENT INSOMNIA: ICD-10-CM

## 2018-08-01 DIAGNOSIS — E55.9 VITAMIN D DEFICIENCY: ICD-10-CM

## 2018-08-01 DIAGNOSIS — Z93.2 ILEOSTOMY PRESENT (HCC): ICD-10-CM

## 2018-08-01 DIAGNOSIS — K50.819 CROHN'S DISEASE OF SMALL AND LARGE INTESTINES WITH COMPLICATION (HCC): ICD-10-CM

## 2018-08-01 DIAGNOSIS — M80.00XD AGE-RELATED OSTEOPOROSIS WITH CURRENT PATHOLOGICAL FRACTURE WITH ROUTINE HEALING: ICD-10-CM

## 2018-08-01 DIAGNOSIS — M80.059A: ICD-10-CM

## 2018-08-01 PROCEDURE — 99309 SBSQ NF CARE MODERATE MDM 30: CPT | Performed by: NURSE PRACTITIONER

## 2018-08-01 NOTE — DISCHARGE SUMMARY
General Medicine Discharge Summary     Patient ID:  Ana Cristina Paiz  80year old  7/28/1934    Admit date: 7/26/2018    Discharge date and time: 7/31/2018  7:29 PM     Attending Physician: Danyel Oliva presently  -f/u with oncology    DC to [facility]     Consults: IP CONSULT TO ONCOLOGY  IP CONSULT TO ORTHOPEDIC SURGERY  IP CONSULT TO HOSPITALIST  IP CONSULT TO PHYSICAL THERAPY  IP CONSULT TO FOOD AND NUTRITION SERVICES  IP CONSULT TO SOCIAL WORK  IP CONSULT General: no acute distress, alert and oriented x 3  Heart: RRR  Lungs: clear bilaterally, no active wheezing  Abdomen: nontender, nondistended, intact BS  Extremities: no pedal edema   Neuro: CN inact, no focal deficits    Total time coordinating car

## 2018-08-01 NOTE — PROGRESS NOTES
Ana Cristina Izabel  : 1934  Age 80year old  female patient is admitted to Facility: The [address] [place] [address] for Rehabilitation and Medical Management.     [address] [place] [address] Drive date:  18  Discharge date to [facility]:  18  ELOS:  7 da with swallowing. Past Medical History:   Diagnosis Date   • ALLERGIC RHINITIS    • ANXIETY    • Anxiety state    • Autoimmune disease ([facility]    • Blood disorder    • Cancer ([facility]     vulva 14 years ago, tx with surgery alone.    • Crohn disease ([facility]    • Sister    • Neurological Disorder Sister      dementia/organic brain syndrome   • Breast Cancer Sister 79   • Cancer Brother      basal cell cancer on scalp and ear   • angina[other] [OTHER] Mother         • Heart Disorder Mother    • Diabetes Dahlia Hinton Take 1 mg by mouth daily.    Disp:  Rfl:        VITALS:  /56   Pulse 66   Temp 98.2 °F (36.8 °C)   Resp 17   SpO2 97%      REVIEW OF SYSTEMS:  GENERAL HEALTH:feels well otherwise; lost a lot of weight, loss of appetite  SKIN: denies any unusual skin l active BS+, soft, nondistended; no organomegaly, no masses; no bruits; nontender, no guarding, no rebound tenderness.   :Deferred  LYMPHATIC:no lymphedema  MUSCULOSKELETAL: ---+sacral and frequent pelvic fractures  EXTREMITIES/VASCULAR:no cyanosis, clubbi (H)   Prelim Neutrophil Abs Latest Ref Range: 1.30 - 6.70 x10 (3) uL 4.17   Neutrophils Absolute Latest Ref Range: 1.30 - 6.70 x10(3) uL 4.17   Lymphocytes Absolute Latest Ref Range: 0.90 - 4.00 x10(3) uL 0.51 (L)   Monocytes Absolute Latest Ref Range: 0.1

## 2018-08-02 ENCOUNTER — NURSE ONLY (OUTPATIENT)
Dept: LAB | Age: 83
End: 2018-08-02
Attending: FAMILY MEDICINE
Payer: MEDICARE

## 2018-08-02 DIAGNOSIS — N18.9 CHRONIC KIDNEY DISEASE, UNSPECIFIED: Primary | ICD-10-CM

## 2018-08-02 DIAGNOSIS — M62.81 MUSCLE WEAKNESS (GENERALIZED): ICD-10-CM

## 2018-08-02 LAB
ALBUMIN SERPL-MCNC: 2.6 G/DL (ref 3.5–4.8)
ALBUMIN/GLOB SERPL: 0.9 {RATIO} (ref 1–2)
ALP LIVER SERPL-CCNC: 73 U/L (ref 55–142)
ALT SERPL-CCNC: 10 U/L (ref 14–54)
ANION GAP SERPL CALC-SCNC: 7 MMOL/L (ref 0–18)
AST SERPL-CCNC: 10 U/L (ref 15–41)
BILIRUB SERPL-MCNC: 0.9 MG/DL (ref 0.1–2)
BUN BLD-MCNC: 22 MG/DL (ref 8–20)
BUN/CREAT SERPL: 8.5 (ref 10–20)
CALCIUM BLD-MCNC: 6.8 MG/DL (ref 8.3–10.3)
CHLORIDE SERPL-SCNC: 110 MMOL/L (ref 101–111)
CO2 SERPL-SCNC: 25 MMOL/L (ref 22–32)
CREAT BLD-MCNC: 2.59 MG/DL (ref 0.55–1.02)
ERYTHROCYTE [DISTWIDTH] IN BLOOD BY AUTOMATED COUNT: 21.5 % (ref 11.5–16)
GLOBULIN PLAS-MCNC: 2.8 G/DL (ref 2.5–3.7)
GLUCOSE BLD-MCNC: 86 MG/DL (ref 70–99)
HAV AB SERPL IA-ACNC: >2000 PG/ML (ref 193–986)
HAV IGM SER QL: 1.8 MG/DL (ref 1.8–2.5)
HCT VFR BLD AUTO: 28.5 % (ref 34–50)
HGB BLD-MCNC: 8.8 G/DL (ref 12–16)
M PROTEIN MFR SERPL ELPH: 5.4 G/DL (ref 6.1–8.3)
MCH RBC QN AUTO: 23.3 PG (ref 27–33.2)
MCHC RBC AUTO-ENTMCNC: 30.9 G/DL (ref 31–37)
MCV RBC AUTO: 75.4 FL (ref 81–100)
OSMOLALITY SERPL CALC.SUM OF ELEC: 297 MOSM/KG (ref 275–295)
PLATELET # BLD AUTO: 158 10(3)UL (ref 150–450)
POTASSIUM SERPL-SCNC: 5.1 MMOL/L (ref 3.6–5.1)
RBC # BLD AUTO: 3.78 X10(6)UL (ref 3.8–5.1)
RED CELL DISTRIBUTION WIDTH-SD: 55.8 FL (ref 35.1–46.3)
SODIUM SERPL-SCNC: 142 MMOL/L (ref 136–144)
VIT D+METAB SERPL-MCNC: 17.2 NG/ML (ref 30–100)
WBC # BLD AUTO: 5.6 X10(3) UL (ref 4–13)

## 2018-08-02 PROCEDURE — 80053 COMPREHEN METABOLIC PANEL: CPT

## 2018-08-02 PROCEDURE — 82607 VITAMIN B-12: CPT

## 2018-08-02 PROCEDURE — 85027 COMPLETE CBC AUTOMATED: CPT

## 2018-08-02 PROCEDURE — 82306 VITAMIN D 25 HYDROXY: CPT

## 2018-08-02 PROCEDURE — 83735 ASSAY OF MAGNESIUM: CPT

## 2018-08-03 ENCOUNTER — SNF ADMIT/H&P (OUTPATIENT)
Dept: FAMILY MEDICINE CLINIC | Facility: CLINIC | Age: 83
End: 2018-08-03

## 2018-08-03 DIAGNOSIS — N18.4 ANEMIA IN STAGE 4 CHRONIC KIDNEY DISEASE (HCC): ICD-10-CM

## 2018-08-03 DIAGNOSIS — R53.81 PHYSICAL DECONDITIONING: ICD-10-CM

## 2018-08-03 DIAGNOSIS — C20 RECTAL CANCER (HCC): Primary | ICD-10-CM

## 2018-08-03 DIAGNOSIS — N18.4 CKD (CHRONIC KIDNEY DISEASE) STAGE 4, GFR 15-29 ML/MIN (HCC): ICD-10-CM

## 2018-08-03 DIAGNOSIS — D63.1 ANEMIA IN STAGE 4 CHRONIC KIDNEY DISEASE (HCC): ICD-10-CM

## 2018-08-03 DIAGNOSIS — S32.9XXD CLOSED NONDISPLACED FRACTURE OF PELVIS WITH ROUTINE HEALING, UNSPECIFIED PART OF PELVIS, SUBSEQUENT ENCOUNTER: ICD-10-CM

## 2018-08-03 PROCEDURE — 99306 1ST NF CARE HIGH MDM 50: CPT | Performed by: FAMILY MEDICINE

## 2018-08-04 VITALS
TEMPERATURE: 98 F | DIASTOLIC BLOOD PRESSURE: 69 MMHG | SYSTOLIC BLOOD PRESSURE: 115 MMHG | RESPIRATION RATE: 16 BRPM | OXYGEN SATURATION: 95 % | HEART RATE: 77 BPM

## 2018-08-04 NOTE — H&P
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   • Renal disorder     ckd   • S/P LASIK (laser assisted in situ keratomileusis) 7/2/2014   • Sjogren's syndrome BLEEDING  Ciprofloxacin               Comment:Itching  Flagyl [Metronidazo*        Comment:Itching  Hydrocodone-Acetami*    DIZZINESS  Msg [Monosodium Glu*        Comment:headache  Nsaids                      Comment:chrohs disease  Pcn [Bicillin L-A] 08/02/2018   K 5.1 08/02/2018    08/02/2018   CO2 25.0 08/02/2018   GLU 86 08/02/2018   CA 6.8 (L) 08/02/2018   ALB 2.6 (L) 08/02/2018   ALKPHO 73 08/02/2018   BILT 0.9 08/02/2018   TP 5.4 (L) 08/02/2018   AST 10 (L) 08/02/2018   ALT 10 (L) 08/02/201 Rfl:   •  acetaminophen 500 MG Oral Tab, Take 1,000 mg by mouth every 6 (six) hours as needed for Pain., Disp: , Rfl:   •  Multiple Vitamins-Minerals (MULTIVITAMIN ADULTS OR), Take 1 tablet by mouth daily.   , Disp: , Rfl:   •  folic acid 1 MG Oral Tab, [facility]

## 2018-08-09 ENCOUNTER — NURSE ONLY (OUTPATIENT)
Dept: LAB | Age: 83
End: 2018-08-09
Attending: NURSE PRACTITIONER
Payer: MEDICARE

## 2018-08-09 ENCOUNTER — APPOINTMENT (OUTPATIENT)
Dept: HEMATOLOGY/ONCOLOGY | Age: 83
End: 2018-08-09
Attending: INTERNAL MEDICINE
Payer: MEDICARE

## 2018-08-09 DIAGNOSIS — D64.9 ANEMIA, UNSPECIFIED: ICD-10-CM

## 2018-08-09 DIAGNOSIS — N18.9 CHRONIC KIDNEY DISEASE, UNSPECIFIED: Primary | ICD-10-CM

## 2018-08-09 LAB
ALBUMIN SERPL-MCNC: 3 G/DL (ref 3.5–4.8)
ALBUMIN/GLOB SERPL: 0.9 {RATIO} (ref 1–2)
ALP LIVER SERPL-CCNC: 70 U/L (ref 55–142)
ALT SERPL-CCNC: 18 U/L (ref 14–54)
ANION GAP SERPL CALC-SCNC: 7 MMOL/L (ref 0–18)
AST SERPL-CCNC: 10 U/L (ref 15–41)
BILIRUB SERPL-MCNC: 0.6 MG/DL (ref 0.1–2)
BUN BLD-MCNC: 53 MG/DL (ref 8–20)
BUN/CREAT SERPL: 17.3 (ref 10–20)
CALCIUM BLD-MCNC: 8.3 MG/DL (ref 8.3–10.3)
CHLORIDE SERPL-SCNC: 114 MMOL/L (ref 101–111)
CO2 SERPL-SCNC: 19 MMOL/L (ref 22–32)
CREAT BLD-MCNC: 3.06 MG/DL (ref 0.55–1.02)
ERYTHROCYTE [DISTWIDTH] IN BLOOD BY AUTOMATED COUNT: 22.8 % (ref 11.5–16)
GLOBULIN PLAS-MCNC: 3.2 G/DL (ref 2.5–3.7)
GLUCOSE BLD-MCNC: 84 MG/DL (ref 70–99)
HCT VFR BLD AUTO: 29.7 % (ref 34–50)
HGB BLD-MCNC: 9.1 G/DL (ref 12–16)
M PROTEIN MFR SERPL ELPH: 6.2 G/DL (ref 6.1–8.3)
MCH RBC QN AUTO: 23.4 PG (ref 27–33.2)
MCHC RBC AUTO-ENTMCNC: 30.6 G/DL (ref 31–37)
MCV RBC AUTO: 76.3 FL (ref 81–100)
OSMOLALITY SERPL CALC.SUM OF ELEC: 304 MOSM/KG (ref 275–295)
PLATELET # BLD AUTO: 175 10(3)UL (ref 150–450)
POTASSIUM SERPL-SCNC: 6.3 MMOL/L (ref 3.6–5.1)
RBC # BLD AUTO: 3.89 X10(6)UL (ref 3.8–5.1)
RED CELL DISTRIBUTION WIDTH-SD: 60.3 FL (ref 35.1–46.3)
SODIUM SERPL-SCNC: 140 MMOL/L (ref 136–144)
WBC # BLD AUTO: 5 X10(3) UL (ref 4–13)

## 2018-08-09 PROCEDURE — 85027 COMPLETE CBC AUTOMATED: CPT

## 2018-08-09 PROCEDURE — 80053 COMPREHEN METABOLIC PANEL: CPT

## 2018-08-10 ENCOUNTER — NURSE ONLY (OUTPATIENT)
Dept: LAB | Age: 83
End: 2018-08-10
Attending: FAMILY MEDICINE
Payer: MEDICARE

## 2018-08-10 ENCOUNTER — SNF VISIT (OUTPATIENT)
Dept: FAMILY MEDICINE CLINIC | Facility: CLINIC | Age: 83
End: 2018-08-10

## 2018-08-10 VITALS
HEART RATE: 64 BPM | DIASTOLIC BLOOD PRESSURE: 47 MMHG | OXYGEN SATURATION: 99 % | RESPIRATION RATE: 18 BRPM | TEMPERATURE: 98 F | SYSTOLIC BLOOD PRESSURE: 95 MMHG

## 2018-08-10 DIAGNOSIS — R53.81 PHYSICAL DECONDITIONING: ICD-10-CM

## 2018-08-10 DIAGNOSIS — C20 RECTAL CANCER (HCC): ICD-10-CM

## 2018-08-10 DIAGNOSIS — E87.5 ACUTE HYPERKALEMIA: Primary | ICD-10-CM

## 2018-08-10 DIAGNOSIS — D63.1 ANEMIA IN STAGE 4 CHRONIC KIDNEY DISEASE (HCC): ICD-10-CM

## 2018-08-10 DIAGNOSIS — E87.5 HYPERPOTASSEMIA: Primary | ICD-10-CM

## 2018-08-10 DIAGNOSIS — N18.4 ANEMIA IN STAGE 4 CHRONIC KIDNEY DISEASE (HCC): ICD-10-CM

## 2018-08-10 DIAGNOSIS — N18.4 CKD (CHRONIC KIDNEY DISEASE) STAGE 4, GFR 15-29 ML/MIN (HCC): ICD-10-CM

## 2018-08-10 LAB
ALBUMIN SERPL-MCNC: 3.1 G/DL (ref 3.5–4.8)
ALBUMIN/GLOB SERPL: 1 {RATIO} (ref 1–2)
ALP LIVER SERPL-CCNC: 71 U/L (ref 55–142)
ALT SERPL-CCNC: 20 U/L (ref 14–54)
ANION GAP SERPL CALC-SCNC: 7 MMOL/L (ref 0–18)
AST SERPL-CCNC: 11 U/L (ref 15–41)
BILIRUB SERPL-MCNC: 0.6 MG/DL (ref 0.1–2)
BUN BLD-MCNC: 51 MG/DL (ref 8–20)
BUN/CREAT SERPL: 17.7 (ref 10–20)
CALCIUM BLD-MCNC: 8.2 MG/DL (ref 8.3–10.3)
CHLORIDE SERPL-SCNC: 115 MMOL/L (ref 101–111)
CO2 SERPL-SCNC: 21 MMOL/L (ref 22–32)
CREAT BLD-MCNC: 2.88 MG/DL (ref 0.55–1.02)
GLOBULIN PLAS-MCNC: 3.2 G/DL (ref 2.5–3.7)
GLUCOSE BLD-MCNC: 80 MG/DL (ref 70–99)
M PROTEIN MFR SERPL ELPH: 6.3 G/DL (ref 6.1–8.3)
OSMOLALITY SERPL CALC.SUM OF ELEC: 309 MOSM/KG (ref 275–295)
POTASSIUM SERPL-SCNC: 5.4 MMOL/L (ref 3.6–5.1)
SODIUM SERPL-SCNC: 143 MMOL/L (ref 136–144)

## 2018-08-10 PROCEDURE — 80053 COMPREHEN METABOLIC PANEL: CPT

## 2018-08-10 PROCEDURE — 99309 SBSQ NF CARE MODERATE MDM 30: CPT | Performed by: FAMILY MEDICINE

## 2018-08-11 ENCOUNTER — NURSE ONLY (OUTPATIENT)
Dept: LAB | Age: 83
End: 2018-08-11
Attending: FAMILY MEDICINE
Payer: MEDICARE

## 2018-08-11 DIAGNOSIS — R53.1 WEAKNESS: Primary | ICD-10-CM

## 2018-08-11 LAB
ALBUMIN SERPL-MCNC: 3 G/DL (ref 3.5–4.8)
ALBUMIN/GLOB SERPL: 0.9 {RATIO} (ref 1–2)
ALP LIVER SERPL-CCNC: 70 U/L (ref 55–142)
ALT SERPL-CCNC: 17 U/L (ref 14–54)
ANION GAP SERPL CALC-SCNC: 10 MMOL/L (ref 0–18)
AST SERPL-CCNC: 11 U/L (ref 15–41)
BASOPHILS # BLD AUTO: 0.04 X10(3) UL (ref 0–0.1)
BASOPHILS NFR BLD AUTO: 0.7 %
BILIRUB SERPL-MCNC: 0.7 MG/DL (ref 0.1–2)
BUN BLD-MCNC: 47 MG/DL (ref 8–20)
BUN/CREAT SERPL: 17.6 (ref 10–20)
CALCIUM BLD-MCNC: 8.2 MG/DL (ref 8.3–10.3)
CHLORIDE SERPL-SCNC: 113 MMOL/L (ref 101–111)
CO2 SERPL-SCNC: 20 MMOL/L (ref 22–32)
CREAT BLD-MCNC: 2.67 MG/DL (ref 0.55–1.02)
EOSINOPHIL # BLD AUTO: 0.11 X10(3) UL (ref 0–0.3)
EOSINOPHIL NFR BLD AUTO: 1.9 %
ERYTHROCYTE [DISTWIDTH] IN BLOOD BY AUTOMATED COUNT: 22.8 % (ref 11.5–16)
GLOBULIN PLAS-MCNC: 3.3 G/DL (ref 2.5–3.7)
GLUCOSE BLD-MCNC: 86 MG/DL (ref 70–99)
HCT VFR BLD AUTO: 29.9 % (ref 34–50)
HGB BLD-MCNC: 9.2 G/DL (ref 12–16)
IMMATURE GRANULOCYTE COUNT: 0.06 X10(3) UL (ref 0–1)
IMMATURE GRANULOCYTE RATIO %: 1 %
LYMPHOCYTES # BLD AUTO: 0.29 X10(3) UL (ref 0.9–4)
LYMPHOCYTES NFR BLD AUTO: 4.9 %
M PROTEIN MFR SERPL ELPH: 6.3 G/DL (ref 6.1–8.3)
MCH RBC QN AUTO: 23.1 PG (ref 27–33.2)
MCHC RBC AUTO-ENTMCNC: 30.8 G/DL (ref 31–37)
MCV RBC AUTO: 75.1 FL (ref 81–100)
MONOCYTES # BLD AUTO: 0.39 X10(3) UL (ref 0.1–1)
MONOCYTES NFR BLD AUTO: 6.6 %
NEUTROPHIL ABS PRELIM: 5.03 X10 (3) UL (ref 1.3–6.7)
NEUTROPHILS # BLD AUTO: 5.03 X10(3) UL (ref 1.3–6.7)
NEUTROPHILS NFR BLD AUTO: 84.9 %
OSMOLALITY SERPL CALC.SUM OF ELEC: 308 MOSM/KG (ref 275–295)
PLATELET # BLD AUTO: 178 10(3)UL (ref 150–450)
PLATELET MORPHOLOGY: NORMAL
POTASSIUM SERPL-SCNC: 5.2 MMOL/L (ref 3.6–5.1)
RBC # BLD AUTO: 3.98 X10(6)UL (ref 3.8–5.1)
RED CELL DISTRIBUTION WIDTH-SD: 59.5 FL (ref 35.1–46.3)
SODIUM SERPL-SCNC: 143 MMOL/L (ref 136–144)
WBC # BLD AUTO: 5.9 X10(3) UL (ref 4–13)

## 2018-08-11 PROCEDURE — 85025 COMPLETE CBC W/AUTO DIFF WBC: CPT

## 2018-08-11 PROCEDURE — 80053 COMPREHEN METABOLIC PANEL: CPT

## 2018-08-13 ENCOUNTER — NURSE ONLY (OUTPATIENT)
Dept: LAB | Age: 83
End: 2018-08-13
Attending: NURSE PRACTITIONER
Payer: MEDICARE

## 2018-08-13 DIAGNOSIS — R73.9 HYPERGLYCEMIA: Primary | ICD-10-CM

## 2018-08-13 LAB
ALBUMIN SERPL-MCNC: 3.1 G/DL (ref 3.5–4.8)
ALBUMIN/GLOB SERPL: 0.9 {RATIO} (ref 1–2)
ALP LIVER SERPL-CCNC: 74 U/L (ref 55–142)
ALT SERPL-CCNC: 18 U/L (ref 14–54)
ANION GAP SERPL CALC-SCNC: 9 MMOL/L (ref 0–18)
AST SERPL-CCNC: 12 U/L (ref 15–41)
BILIRUB SERPL-MCNC: 1 MG/DL (ref 0.1–2)
BUN BLD-MCNC: 52 MG/DL (ref 8–20)
BUN/CREAT SERPL: 19.7 (ref 10–20)
CALCIUM BLD-MCNC: 8.4 MG/DL (ref 8.3–10.3)
CHLORIDE SERPL-SCNC: 115 MMOL/L (ref 101–111)
CO2 SERPL-SCNC: 18 MMOL/L (ref 22–32)
CREAT BLD-MCNC: 2.64 MG/DL (ref 0.55–1.02)
GLOBULIN PLAS-MCNC: 3.5 G/DL (ref 2.5–3.7)
GLUCOSE BLD-MCNC: 91 MG/DL (ref 70–99)
M PROTEIN MFR SERPL ELPH: 6.6 G/DL (ref 6.1–8.3)
OSMOLALITY SERPL CALC.SUM OF ELEC: 308 MOSM/KG (ref 275–295)
POTASSIUM SERPL-SCNC: 5.3 MMOL/L (ref 3.6–5.1)
SODIUM SERPL-SCNC: 142 MMOL/L (ref 136–144)

## 2018-08-13 PROCEDURE — 80053 COMPREHEN METABOLIC PANEL: CPT

## 2018-08-14 ENCOUNTER — SNF DISCHARGE (OUTPATIENT)
Dept: INTERNAL MEDICINE CLINIC | Age: 83
End: 2018-08-14

## 2018-08-14 VITALS
HEART RATE: 87 BPM | TEMPERATURE: 98 F | OXYGEN SATURATION: 97 % | SYSTOLIC BLOOD PRESSURE: 113 MMHG | DIASTOLIC BLOOD PRESSURE: 51 MMHG | RESPIRATION RATE: 19 BRPM

## 2018-08-14 DIAGNOSIS — R53.1 WEAKNESS GENERALIZED: Primary | ICD-10-CM

## 2018-08-14 DIAGNOSIS — E86.0 DEHYDRATION: ICD-10-CM

## 2018-08-14 DIAGNOSIS — Z93.2 ILEOSTOMY PRESENT (HCC): ICD-10-CM

## 2018-08-14 DIAGNOSIS — N18.4 CKD (CHRONIC KIDNEY DISEASE) STAGE 4, GFR 15-29 ML/MIN (HCC): ICD-10-CM

## 2018-08-14 DIAGNOSIS — N17.9 AKI (ACUTE KIDNEY INJURY) (HCC): ICD-10-CM

## 2018-08-14 DIAGNOSIS — E87.5 HYPERKALEMIA: ICD-10-CM

## 2018-08-14 DIAGNOSIS — C80.1 CANCER (HCC): ICD-10-CM

## 2018-08-14 PROCEDURE — 99316 NF DSCHRG MGMT 30 MIN+: CPT | Performed by: NURSE PRACTITIONER

## 2018-08-15 NOTE — PROGRESS NOTES
Yossi Gaxiola, 7/28/1934, 80year old, female is being discharged from [address] [place] [address] at [address] [facility]    Date of Admission: 7/31/18    Date of Anticipated Discharge: 8/14/18                            [address] nystagmus, no drainage from eyes  HENT: normocephalic; normal nose, no nasal drainage, mucous membranes pink, moist, pharynx no exudate, no visible cerumen;+cataracts in both eyes  NECK: supple; FROM; no JVD, no TMG, no carotid bruits  BREAST: ---deferred discharging home the next day. Dr. Daphne Lopes made aware. · Will discharge with home health RN, PT/OT and bath aide. Most recent lab results:  Results for Michaela Corey (MRN [id number]) as of 8/14/2018 21:44   Ref.  Range 8/13/2018 08:17   Glucose qd     Labs  - CBC, CMP q weekly, per home health     Follow up appointments  -Dr. Pranav Molina, PCP in 7 to 10 days after discharge from [place] [address].  -Dr. Kenia Leavitt prn  -Dr. Hussein Sloan, Oncology on 8/16/18  -Home health RN, PT/OT and bath aide to foll

## 2018-08-15 NOTE — PROGRESS NOTES
HPI:    Patient ID: Carrie Higuera is a 80year old female. HPI    Review of Systems         Current Outpatient Prescriptions:  Melatonin 3 MG Oral Cap Take 3 mg by mouth every evening.  Disp:  Rfl:    mirtazapine 7.5 MG Oral Tab Take 1 tablet (7.5

## 2018-08-16 ENCOUNTER — OFFICE VISIT (OUTPATIENT)
Dept: HEMATOLOGY/ONCOLOGY | Age: 83
End: 2018-08-16
Attending: INTERNAL MEDICINE
Payer: MEDICARE

## 2018-08-16 VITALS
RESPIRATION RATE: 16 BRPM | OXYGEN SATURATION: 98 % | SYSTOLIC BLOOD PRESSURE: 146 MMHG | WEIGHT: 117.19 LBS | DIASTOLIC BLOOD PRESSURE: 67 MMHG | HEART RATE: 61 BPM | BODY MASS INDEX: 20 KG/M2 | TEMPERATURE: 97 F

## 2018-08-16 DIAGNOSIS — D50.0 IRON DEFICIENCY ANEMIA DUE TO CHRONIC BLOOD LOSS: ICD-10-CM

## 2018-08-16 DIAGNOSIS — C20 RECTAL CANCER (HCC): Primary | ICD-10-CM

## 2018-08-16 DIAGNOSIS — E86.0 DEHYDRATION: Primary | ICD-10-CM

## 2018-08-16 DIAGNOSIS — N18.4 CKD (CHRONIC KIDNEY DISEASE) STAGE 4, GFR 15-29 ML/MIN (HCC): ICD-10-CM

## 2018-08-16 DIAGNOSIS — E86.0 DEHYDRATION: ICD-10-CM

## 2018-08-16 LAB
ALBUMIN SERPL-MCNC: 3.3 G/DL (ref 3.5–4.8)
ALBUMIN/GLOB SERPL: 0.9 {RATIO} (ref 1–2)
ALP LIVER SERPL-CCNC: 82 U/L (ref 55–142)
ALT SERPL-CCNC: 21 U/L (ref 14–54)
ANION GAP SERPL CALC-SCNC: 12 MMOL/L (ref 0–18)
AST SERPL-CCNC: 13 U/L (ref 15–41)
BASOPHILS # BLD AUTO: 0.07 X10(3) UL (ref 0–0.1)
BASOPHILS NFR BLD AUTO: 0.9 %
BILIRUB SERPL-MCNC: 1 MG/DL (ref 0.1–2)
BUN BLD-MCNC: 46 MG/DL (ref 8–20)
BUN/CREAT SERPL: 17.8 (ref 10–20)
CALCIUM BLD-MCNC: 7.8 MG/DL (ref 8.3–10.3)
CHLORIDE SERPL-SCNC: 110 MMOL/L (ref 101–111)
CO2 SERPL-SCNC: 18 MMOL/L (ref 22–32)
CREAT BLD-MCNC: 2.59 MG/DL (ref 0.55–1.02)
EOSINOPHIL # BLD AUTO: 0.13 X10(3) UL (ref 0–0.3)
EOSINOPHIL NFR BLD AUTO: 1.7 %
ERYTHROCYTE [DISTWIDTH] IN BLOOD BY AUTOMATED COUNT: 22.8 % (ref 11.5–16)
GLOBULIN PLAS-MCNC: 3.8 G/DL (ref 2.5–4)
GLUCOSE BLD-MCNC: 95 MG/DL (ref 70–99)
HCT VFR BLD AUTO: 28.2 % (ref 34–50)
HGB BLD-MCNC: 9 G/DL (ref 12–16)
IMMATURE GRANULOCYTE COUNT: 0.05 X10(3) UL (ref 0–1)
IMMATURE GRANULOCYTE RATIO %: 0.7 %
LYMPHOCYTES # BLD AUTO: 0.51 X10(3) UL (ref 0.9–4)
LYMPHOCYTES NFR BLD AUTO: 6.7 %
M PROTEIN MFR SERPL ELPH: 7.1 G/DL (ref 6.1–8.3)
MCH RBC QN AUTO: 23.3 PG (ref 27–33.2)
MCHC RBC AUTO-ENTMCNC: 31.9 G/DL (ref 31–37)
MCV RBC AUTO: 72.9 FL (ref 81–100)
MONOCYTES # BLD AUTO: 0.52 X10(3) UL (ref 0.1–1)
MONOCYTES NFR BLD AUTO: 6.8 %
NEUTROPHIL ABS PRELIM: 6.37 X10 (3) UL (ref 1.3–6.7)
NEUTROPHILS # BLD AUTO: 6.37 X10(3) UL (ref 1.3–6.7)
NEUTROPHILS NFR BLD AUTO: 83.2 %
OSMOLALITY SERPL CALC.SUM OF ELEC: 302 MOSM/KG (ref 275–295)
PLATELET # BLD AUTO: 267 10(3)UL (ref 150–450)
PLATELET MORPHOLOGY: NORMAL
POTASSIUM SERPL-SCNC: 4.7 MMOL/L (ref 3.6–5.1)
RBC # BLD AUTO: 3.87 X10(6)UL (ref 3.8–5.1)
RED CELL DISTRIBUTION WIDTH-SD: 57.7 FL (ref 35.1–46.3)
SODIUM SERPL-SCNC: 140 MMOL/L (ref 136–144)
WBC # BLD AUTO: 7.7 X10(3) UL (ref 4–13)

## 2018-08-16 PROCEDURE — 99214 OFFICE O/P EST MOD 30 MIN: CPT | Performed by: INTERNAL MEDICINE

## 2018-08-16 RX ORDER — DIPHENOXYLATE HYDROCHLORIDE AND ATROPINE SULFATE 2.5; .025 MG/1; MG/1
1-2 TABLET ORAL 4 TIMES DAILY PRN
Qty: 90 TABLET | Refills: 3 | Status: SHIPPED | OUTPATIENT
Start: 2018-08-16 | End: 2019-01-01

## 2018-08-16 NOTE — PROGRESS NOTES
Pt here for 1 month MD sena. Pt was hospitalized 7/26-7/31 then sent to rehab, got home yesterday. Energy level is \"terrible\", appetite is fair, states she overate in the rehab and since getting home has stopped \"pill for appetite\".  Pt notes almost cons

## 2018-08-18 ENCOUNTER — APPOINTMENT (OUTPATIENT)
Dept: CT IMAGING | Facility: HOSPITAL | Age: 83
DRG: 375 | End: 2018-08-18
Attending: EMERGENCY MEDICINE
Payer: MEDICARE

## 2018-08-18 ENCOUNTER — APPOINTMENT (OUTPATIENT)
Dept: GENERAL RADIOLOGY | Facility: HOSPITAL | Age: 83
DRG: 375 | End: 2018-08-18
Attending: INTERNAL MEDICINE
Payer: MEDICARE

## 2018-08-18 ENCOUNTER — HOSPITAL ENCOUNTER (INPATIENT)
Facility: HOSPITAL | Age: 83
LOS: 14 days | Discharge: NURSING, REHAB OR LONG-TERM CARE | DRG: 375 | End: 2018-09-01
Attending: EMERGENCY MEDICINE | Admitting: HOSPITALIST
Payer: MEDICARE

## 2018-08-18 DIAGNOSIS — D63.1 ANEMIA IN STAGE 4 CHRONIC KIDNEY DISEASE (HCC): ICD-10-CM

## 2018-08-18 DIAGNOSIS — R11.2 INTRACTABLE VOMITING WITH NAUSEA, UNSPECIFIED VOMITING TYPE: Primary | ICD-10-CM

## 2018-08-18 DIAGNOSIS — N18.4 ANEMIA IN STAGE 4 CHRONIC KIDNEY DISEASE (HCC): ICD-10-CM

## 2018-08-18 DIAGNOSIS — G89.3 CANCER ASSOCIATED PAIN: ICD-10-CM

## 2018-08-18 DIAGNOSIS — C20 RECTAL CANCER (HCC): ICD-10-CM

## 2018-08-18 DIAGNOSIS — N13.30 HYDRONEPHROSIS, UNSPECIFIED HYDRONEPHROSIS TYPE: ICD-10-CM

## 2018-08-18 DIAGNOSIS — R33.9 URINARY RETENTION: ICD-10-CM

## 2018-08-18 PROBLEM — D64.9 ANEMIA: Status: ACTIVE | Noted: 2018-08-18

## 2018-08-18 PROBLEM — E87.1 HYPONATREMIA: Status: ACTIVE | Noted: 2018-08-18

## 2018-08-18 LAB
ALBUMIN SERPL-MCNC: 3.5 G/DL (ref 3.5–4.8)
ALBUMIN/GLOB SERPL: 1 {RATIO} (ref 1–2)
ALP LIVER SERPL-CCNC: 83 U/L (ref 55–142)
ALT SERPL-CCNC: 18 U/L (ref 14–54)
ANION GAP SERPL CALC-SCNC: 11 MMOL/L (ref 0–18)
AST SERPL-CCNC: 16 U/L (ref 15–41)
BASOPHILS # BLD AUTO: 0.02 X10(3) UL (ref 0–0.1)
BASOPHILS NFR BLD AUTO: 0.2 %
BILIRUB SERPL-MCNC: 1.1 MG/DL (ref 0.1–2)
BILIRUB UR QL STRIP.AUTO: NEGATIVE
BUN BLD-MCNC: 38 MG/DL (ref 8–20)
BUN/CREAT SERPL: 15.4 (ref 10–20)
CALCIUM BLD-MCNC: 7.5 MG/DL (ref 8.3–10.3)
CHLORIDE SERPL-SCNC: 103 MMOL/L (ref 101–111)
CO2 SERPL-SCNC: 18 MMOL/L (ref 22–32)
COLOR UR AUTO: YELLOW
CREAT BLD-MCNC: 2.47 MG/DL (ref 0.55–1.02)
EOSINOPHIL # BLD AUTO: 0.04 X10(3) UL (ref 0–0.3)
EOSINOPHIL NFR BLD AUTO: 0.4 %
ERYTHROCYTE [DISTWIDTH] IN BLOOD BY AUTOMATED COUNT: 21.9 % (ref 11.5–16)
GLOBULIN PLAS-MCNC: 3.6 G/DL (ref 2.5–4)
GLUCOSE BLD-MCNC: 99 MG/DL (ref 70–99)
GLUCOSE UR STRIP.AUTO-MCNC: NEGATIVE MG/DL
HCT VFR BLD AUTO: 26.4 % (ref 34–50)
HGB BLD-MCNC: 8.5 G/DL (ref 12–16)
IMMATURE GRANULOCYTE COUNT: 0.05 X10(3) UL (ref 0–1)
IMMATURE GRANULOCYTE RATIO %: 0.5 %
KETONES UR STRIP.AUTO-MCNC: NEGATIVE MG/DL
LEUKOCYTE ESTERASE UR QL STRIP.AUTO: NEGATIVE
LIPASE: 420 U/L (ref 73–393)
LYMPHOCYTES # BLD AUTO: 0.38 X10(3) UL (ref 0.9–4)
LYMPHOCYTES NFR BLD AUTO: 3.8 %
M PROTEIN MFR SERPL ELPH: 7.1 G/DL (ref 6.1–8.3)
MCH RBC QN AUTO: 23.4 PG (ref 27–33.2)
MCHC RBC AUTO-ENTMCNC: 32.2 G/DL (ref 31–37)
MCV RBC AUTO: 72.5 FL (ref 81–100)
MONOCYTES # BLD AUTO: 0.53 X10(3) UL (ref 0.1–1)
MONOCYTES NFR BLD AUTO: 5.4 %
NEUTROPHIL ABS PRELIM: 8.87 X10 (3) UL (ref 1.3–6.7)
NEUTROPHILS # BLD AUTO: 8.87 X10(3) UL (ref 1.3–6.7)
NEUTROPHILS NFR BLD AUTO: 89.7 %
NITRITE UR QL STRIP.AUTO: NEGATIVE
OSMOLALITY SERPL CALC.SUM OF ELEC: 283 MOSM/KG (ref 275–295)
PH UR STRIP.AUTO: 5 [PH] (ref 4.5–8)
PLATELET # BLD AUTO: 228 10(3)UL (ref 150–450)
POTASSIUM SERPL-SCNC: 5.1 MMOL/L (ref 3.6–5.1)
PROT UR STRIP.AUTO-MCNC: NEGATIVE MG/DL
RBC # BLD AUTO: 3.64 X10(6)UL (ref 3.8–5.1)
RBC UR QL AUTO: NEGATIVE
RED CELL DISTRIBUTION WIDTH-SD: 54.7 FL (ref 35.1–46.3)
SODIUM SERPL-SCNC: 132 MMOL/L (ref 136–144)
SP GR UR STRIP.AUTO: 1.01 (ref 1–1.03)
UROBILINOGEN UR STRIP.AUTO-MCNC: <2 MG/DL
WBC # BLD AUTO: 9.9 X10(3) UL (ref 4–13)

## 2018-08-18 PROCEDURE — 73560 X-RAY EXAM OF KNEE 1 OR 2: CPT | Performed by: INTERNAL MEDICINE

## 2018-08-18 PROCEDURE — 74176 CT ABD & PELVIS W/O CONTRAST: CPT | Performed by: EMERGENCY MEDICINE

## 2018-08-18 RX ORDER — DIPHENHYDRAMINE HYDROCHLORIDE 50 MG/ML
25 INJECTION INTRAMUSCULAR; INTRAVENOUS ONCE
Status: COMPLETED | OUTPATIENT
Start: 2018-08-18 | End: 2018-08-18

## 2018-08-18 RX ORDER — SODIUM CHLORIDE 9 MG/ML
INJECTION, SOLUTION INTRAVENOUS CONTINUOUS
Status: ACTIVE | OUTPATIENT
Start: 2018-08-18 | End: 2018-08-18

## 2018-08-18 RX ORDER — SODIUM CHLORIDE 9 MG/ML
INJECTION, SOLUTION INTRAVENOUS CONTINUOUS
Status: DISCONTINUED | OUTPATIENT
Start: 2018-08-18 | End: 2018-08-20

## 2018-08-18 RX ORDER — MORPHINE SULFATE 4 MG/ML
2 INJECTION, SOLUTION INTRAMUSCULAR; INTRAVENOUS EVERY 2 HOUR PRN
Status: DISCONTINUED | OUTPATIENT
Start: 2018-08-18 | End: 2018-08-22

## 2018-08-18 RX ORDER — LORAZEPAM 2 MG/ML
0.5 INJECTION INTRAMUSCULAR ONCE
Status: DISCONTINUED | OUTPATIENT
Start: 2018-08-18 | End: 2018-09-01

## 2018-08-18 RX ORDER — ONDANSETRON 2 MG/ML
4 INJECTION INTRAMUSCULAR; INTRAVENOUS EVERY 4 HOURS PRN
Status: DISCONTINUED | OUTPATIENT
Start: 2018-08-18 | End: 2018-09-01

## 2018-08-18 RX ORDER — ONDANSETRON 2 MG/ML
4 INJECTION INTRAMUSCULAR; INTRAVENOUS ONCE
Status: COMPLETED | OUTPATIENT
Start: 2018-08-18 | End: 2018-08-18

## 2018-08-18 RX ORDER — ONDANSETRON 2 MG/ML
4 INJECTION INTRAMUSCULAR; INTRAVENOUS EVERY 6 HOURS PRN
Status: DISCONTINUED | OUTPATIENT
Start: 2018-08-18 | End: 2018-09-01

## 2018-08-18 RX ORDER — MORPHINE SULFATE 4 MG/ML
1 INJECTION, SOLUTION INTRAMUSCULAR; INTRAVENOUS EVERY 2 HOUR PRN
Status: DISCONTINUED | OUTPATIENT
Start: 2018-08-18 | End: 2018-08-22

## 2018-08-18 RX ORDER — LORAZEPAM 2 MG/ML
1 INJECTION INTRAMUSCULAR ONCE
Status: COMPLETED | OUTPATIENT
Start: 2018-08-18 | End: 2018-08-18

## 2018-08-18 NOTE — ED INITIAL ASSESSMENT (HPI)
Patient arrives with c/o vomiting that began yesterday. Patient states she is feeling weak and unable to keep food down. History of colon cancer.

## 2018-08-18 NOTE — ED PROVIDER NOTES
Patient Seen in: [facility] Emergency Department    History   Patient presents with:  Nausea/Vomiting/Diarrhea (gastrointestinal)    Stated Complaint: vomiting;     HPI     Patient presents with vomiting.   The patient states that she was discharged ckd   • S/P LASIK (laser assisted in situ keratomileusis) 7/2/2014   • S[facility]ogren's syndrome ([email or web address]    • Stroke ([facility]     tia   • Thalassemia minor    • THALLASEMIA    • Unspecified essential hypertension    • Unspecified sleep apnea psg 8/5/13 [place] 8-27-13    A to light, oropharynx clear, mucous membranes dry. Neck: Supple. Cardiovascular: Regular rate and rhythm, no murmurs. Respiratory: Lungs clear to auscultation.   Abdomen: Soft, ileostomy in the right midabdomen with liquid stool with an orange tinge, mild Old fracture of the inferior right pubic ramus. This has been previously reported. Whether this is related to the mass in the ischiorectal fossa and pelvic floor is unknown.   Dictated by: Herb Zaragoza MD on 8/18/2018 at 17:00     Approved by: Sherlyn Mendoza ischiorectal fossa, the were right side of the left Vater muscle and is probably extending through the pelvic floor to invade a portion of the obturator externus muscle.   The uterus is deviated to the right by the mass a be distended rectum and rectosigmoi possibility of anorectal obstruction cannot be excluded and correlation with physical examination to exclude involvement of the anorectal junction by the mass in the ischiorectal fossa is recommended as a potential cause of this massively dilated rectum. type  (primary encounter diagnosis)  Urinary retention  Hydronephrosis, unspecified hydronephrosis type    Disposition:  Admit  8/18/2018  5:18 pm    Follow-up:  No follow-up provider specified.       Medications Prescribed:  Current Discharge Medication Jeff Hinojosa

## 2018-08-19 LAB
ANION GAP SERPL CALC-SCNC: 11 MMOL/L (ref 0–18)
BASOPHILS # BLD AUTO: 0.03 X10(3) UL (ref 0–0.1)
BASOPHILS NFR BLD AUTO: 0.4 %
BUN BLD-MCNC: 33 MG/DL (ref 8–20)
BUN/CREAT SERPL: 14 (ref 10–20)
CALCIUM BLD-MCNC: 6.9 MG/DL (ref 8.3–10.3)
CHLORIDE SERPL-SCNC: 111 MMOL/L (ref 101–111)
CO2 SERPL-SCNC: 16 MMOL/L (ref 22–32)
CREAT BLD-MCNC: 2.35 MG/DL (ref 0.55–1.02)
EOSINOPHIL # BLD AUTO: 0.12 X10(3) UL (ref 0–0.3)
EOSINOPHIL NFR BLD AUTO: 1.7 %
ERYTHROCYTE [DISTWIDTH] IN BLOOD BY AUTOMATED COUNT: 21.6 % (ref 11.5–16)
GLUCOSE BLD-MCNC: 80 MG/DL (ref 70–99)
HCT VFR BLD AUTO: 24.1 % (ref 34–50)
HGB BLD-MCNC: 7.7 G/DL (ref 12–16)
IMMATURE GRANULOCYTE COUNT: 0.04 X10(3) UL (ref 0–1)
IMMATURE GRANULOCYTE RATIO %: 0.6 %
LYMPHOCYTES # BLD AUTO: 0.41 X10(3) UL (ref 0.9–4)
LYMPHOCYTES NFR BLD AUTO: 5.7 %
MCH RBC QN AUTO: 23.1 PG (ref 27–33.2)
MCHC RBC AUTO-ENTMCNC: 32 G/DL (ref 31–37)
MCV RBC AUTO: 72.4 FL (ref 81–100)
MONOCYTES # BLD AUTO: 0.6 X10(3) UL (ref 0.1–1)
MONOCYTES NFR BLD AUTO: 8.3 %
NEUTROPHIL ABS PRELIM: 5.99 X10 (3) UL (ref 1.3–6.7)
NEUTROPHILS # BLD AUTO: 5.99 X10(3) UL (ref 1.3–6.7)
NEUTROPHILS NFR BLD AUTO: 83.3 %
OSMOLALITY SERPL CALC.SUM OF ELEC: 292 MOSM/KG (ref 275–295)
PLATELET # BLD AUTO: 179 10(3)UL (ref 150–450)
POTASSIUM SERPL-SCNC: 4.4 MMOL/L (ref 3.6–5.1)
RBC # BLD AUTO: 3.33 X10(6)UL (ref 3.8–5.1)
RED CELL DISTRIBUTION WIDTH-SD: 54.9 FL (ref 35.1–46.3)
SODIUM SERPL-SCNC: 138 MMOL/L (ref 136–144)
WBC # BLD AUTO: 7.2 X10(3) UL (ref 4–13)

## 2018-08-19 PROCEDURE — 99223 1ST HOSP IP/OBS HIGH 75: CPT | Performed by: INTERNAL MEDICINE

## 2018-08-19 RX ORDER — HYDROCODONE BITARTRATE AND ACETAMINOPHEN 5; 325 MG/1; MG/1
1 TABLET ORAL EVERY 4 HOURS PRN
Status: DISCONTINUED | OUTPATIENT
Start: 2018-08-19 | End: 2018-09-01

## 2018-08-19 RX ORDER — SODIUM BICARBONATE 325 MG/1
1300 TABLET ORAL 2 TIMES DAILY
Status: DISCONTINUED | OUTPATIENT
Start: 2018-08-19 | End: 2018-08-20

## 2018-08-19 RX ORDER — FOLIC ACID 1 MG/1
1 TABLET ORAL DAILY
Status: DISCONTINUED | OUTPATIENT
Start: 2018-08-19 | End: 2018-09-01

## 2018-08-19 RX ORDER — ACETAMINOPHEN 500 MG
1000 TABLET ORAL EVERY 6 HOURS PRN
Status: DISCONTINUED | OUTPATIENT
Start: 2018-08-19 | End: 2018-08-20

## 2018-08-19 RX ORDER — HYDROCODONE BITARTRATE AND ACETAMINOPHEN 5; 325 MG/1; MG/1
2 TABLET ORAL EVERY 4 HOURS PRN
Status: DISCONTINUED | OUTPATIENT
Start: 2018-08-19 | End: 2018-09-01

## 2018-08-19 NOTE — CONSULTS
[facility]  Report of Consultation    Norm Brink Patient Status:  Inpatient    1934 MRN [id number]   [facility] 3NW-A Attending Brady Claire,*   Hosp Day # 1 PCP Sima Archuleta MD     Reason for Consultation:  h • Fibromyalgia    • Hearing impairment    • High blood pressure    • History of blood transfusion    • IBS (irritable bowel syndrome)    • Insomnia    • KIDNEY STONE    • Macular degeneration    • Migraines     as a child   • Osteoarthrosis, unspecified Sister       reports that she quit smoking about 43 years ago. Her smoking use included Cigarettes. She has a 25.00 pack-year smoking history. She has never used smokeless tobacco. She reports that she drinks alcohol.  She reports that she does not use drug nontender  Has ileostomy  BLADDER region is nontender  URINE in naqvi is clear    I briefly reviewed that a naqvi catheter could be a permanent option that she could be taught to manage if needed    Laboratory Data:    Lab Results  Component Value Date   W to the level of the anal verge. This has developed since 4/14/2018.   The possibility of anorectal obstruction cannot be excluded and correlation with physical examination to exclude involvement of the anorectal junction by the mass in the ischiorectal fos to chronic blood loss     Pelvic pain     Intractable pain     Anemia, unspecified     Nausea     Stage 4 chronic kidney disease (HCC)     Acidosis     Hyponatremia     Anemia     Intractable vomiting with nausea     Intractable vomiting with nausea, unspe

## 2018-08-19 NOTE — PROGRESS NOTES
Pt resting in bed. Fall precautions taken. Pt denies use of Cpap at home. CPOX/ra. Tele-SR. VSS. Naqvi clear yellow. NPO, pt denies nausea. IVF infusing.  Pt forgetful at times and needs reminder such as that she has a naqvi and ileostomy for urination and

## 2018-08-19 NOTE — PROGRESS NOTES
Writing RN asked pt if it was OK to call a family member or friend to update them on pts condition and plan. Pt stated that Monique Bowens is her X  and she didn't want to him to be notified.  Malgorzata Phipps (son) she stated was adopted and they haven't had a

## 2018-08-19 NOTE — PROGRESS NOTES
NURSING ADMISSION NOTE      Patient admitted via cart from ER  Oriented to room. Safety precautions initiated. Bed in low position. Call light in reach. Pt admitted from ER. Belongings at bedside. Pts alert and oriented x4. Nunes draining to bag.

## 2018-08-19 NOTE — PROGRESS NOTES
[facility] Hospitalist Progress Note                                                                   [address]  7/28/1934    CC: FU weakness    Interval History:  - Looks better today 16.0*           ROS: no change to ROS from my documentation yesterday, except as otherwise noted in the Interval History above.     Assessment/Plan:    81 yo F with advanced rectal CA here with weakness, poor PO, abd bloating and poor UOP     # Rectal CA  -

## 2018-08-19 NOTE — H&P
Know rectal CA. Mass looks largely progressive on todays imaging with hydronephrosis.    She has liquid stool unchanged from ostomy  Hasn't been able to urinate but reports urine leaking- bladder looks OK on imaging- new hydro    No fever, no leukocytosis, (laser assisted in situ keratomileusis) 7/2/2014   • Sjogren's syndrome ([facility]    • Stroke ([facility]     tia   • Thalassemia minor    • THALLASEMIA    • Unspecified essential hypertension    • Unspecified sleep apnea psg 8/5/13 [place] 8-27-13    AHI 52/ supine 101/ Comment:chrohs disease  Pcn [Bicillin L-A]      RASH  Sulfa Antibiotics       RASH     Past Social Hx:  Denies heavy ETOH use  Denies illicts    Fam Hx  Family History   Problem Relation Age of Onset   • Cancer Sister      breast cancer   • Stroke Sister sigmoid and rectosigmoid stump to the level of the anal verge. This has developed since 4/14/2018.   The possibility of anorectal obstruction cannot be excluded and correlation with   physical examination to exclude involvement of the anorectal junction by Hospitalist  Pager: [phone]

## 2018-08-19 NOTE — PROGRESS NOTES
Pt wanted to have ileostomy emptied. Pt was sitting on the side of the bed with writing rn right next to her. RN reached over to grab the canister and the pt attempted to get up. Pt was unable to bear weight and fell on her right knee.  RN was on left side

## 2018-08-19 NOTE — PROGRESS NOTES
Pt requesting something for sleep. Dr Tanisha Mendoza notified. MD updated on knee xray results and IV Benadryl ordered.

## 2018-08-19 NOTE — CONSULTS
Hematology/Oncology Initial Consultation Note    Patient Name: Bossman Vasquez  Medical Record Number: [id number]    YOB: 1934   Date of Consultation: 8/19/2018   Physician requesting consultation: Dr. Danielle Maldonado    Reason for C Deidra Bullard as well. She had an MRI of the pelvis because of persistent pain, this showed she had fractures and pelvic disease.  She was also found to have worsening kidney disease and was referred to Dr Izaiah Burns.      Past Medical History:  Past Medical History: HISTORY      Comment: perirectal jaja  10-07: OTHER SURGICAL HISTORY      Comment: hip fracture    Home Medications:    Current Facility-Administered Medications on File Prior to Encounter:  [COMPLETED] Potassium Phosphate Dibasic 20 mmol in sodium chl magnesium sulfate IVPB premix 4 g 4 g Intravenous Once Kirsty Brown MD Last Rate: 50 mL/hr at 07/28/18 1141 4 g at 07/28/18 1141   [COMPLETED] 0.9%  NaCl infusion  Intravenous Once Prince Cordon MD Last Rate: 10 mL/hr at 07/27/18 1337    [COMPLETED] RASH  Sulfa Antibiotics       RASH    Psychosocial History:    Social History  Social History   Marital status:   Spouse name: N/A    Years of education: N/A  Number of children: N/A     Occupational History  None on file     Social History Main Top bilaterally  GI/Abd: Soft, minimal distension. +ileostomy.  non-tender with normoactive bowel sounds  Neurological: Grossly intact   Lymphatics: No palpable lymphadenopathy  Skin: no rashes or petechiae    Laboratory:  Recent Labs   Lab  08/16/18   1140  0 mild to moderate bilateral hydronephrosis. There is bilateral ureterectasis. An obstructing stone is not identified. Please see above. ADRENALS:  No mass or enlargement. AORTA/VASCULAR:    Unremarkable as seen on non-contrast imaging.    Colbert Libman patient age. BONES:  No bony lesion or fracture. LUNG BASES:  No visible pulmonary or pleural disease.     OTHER:  Negative.     =====  CONCLUSION:  There is a known right sided ischiorectal mass measuring 6.6 x 4.2 x 6.5 cm which is probably extendin have been required).  Status post palliative concurrent chemoradiation completed 6/17. Suspected ongoing local progression. Not a candidate for surgery or systemic chemotherapy.  Had very poor tolerance to the mildest chemotherapy in the past.       # Anemi

## 2018-08-19 NOTE — PROGRESS NOTES
Dr. Rosalba Song on floor to see patient and stated ok to advane diet if ok with urology. Dr. Rosario Hollingsworth paged. 1501: Dr. Rosario Hollingsworth paged and stated ok to advance diet as tolerated, no plans for surgery today. Will be in to see patient.  Patient updated on poc and shows un

## 2018-08-19 NOTE — PROGRESS NOTES
Dr Jacquelin Lacy and Dr Abrahan Jaime notified on consult.  MDs updated on pt and no orders at this time, will see pt in am.

## 2018-08-19 NOTE — PROGRESS NOTES
08/19/18 1254   Clinical Encounter Type   Visited With Patient   Routine Visit Introduction   Continue Visiting No   Orthodoxy Encounters   Spiritual Requests During Visit / Hospitalization [address]

## 2018-08-20 LAB
ANION GAP SERPL CALC-SCNC: 8 MMOL/L (ref 0–18)
ATRIAL RATE: 78 BPM
BASOPHILS # BLD AUTO: 0.03 X10(3) UL (ref 0–0.1)
BASOPHILS NFR BLD AUTO: 0.5 %
BUN BLD-MCNC: 25 MG/DL (ref 8–20)
BUN/CREAT SERPL: 12.6 (ref 10–20)
CALCIUM BLD-MCNC: 6.6 MG/DL (ref 8.3–10.3)
CHLORIDE SERPL-SCNC: 116 MMOL/L (ref 101–111)
CO2 SERPL-SCNC: 19 MMOL/L (ref 22–32)
CREAT BLD-MCNC: 1.99 MG/DL (ref 0.55–1.02)
EOSINOPHIL # BLD AUTO: 0.13 X10(3) UL (ref 0–0.3)
EOSINOPHIL NFR BLD AUTO: 2 %
ERYTHROCYTE [DISTWIDTH] IN BLOOD BY AUTOMATED COUNT: 21.7 % (ref 11.5–16)
GLUCOSE BLD-MCNC: 86 MG/DL (ref 70–99)
HAV IGM SER QL: 1 MG/DL (ref 1.8–2.5)
HCT VFR BLD AUTO: 23.7 % (ref 34–50)
HGB BLD-MCNC: 7.5 G/DL (ref 12–16)
IMMATURE GRANULOCYTE COUNT: 0.04 X10(3) UL (ref 0–1)
IMMATURE GRANULOCYTE RATIO %: 0.6 %
LYMPHOCYTES # BLD AUTO: 0.34 X10(3) UL (ref 0.9–4)
LYMPHOCYTES NFR BLD AUTO: 5.2 %
MCH RBC QN AUTO: 23.4 PG (ref 27–33.2)
MCHC RBC AUTO-ENTMCNC: 31.6 G/DL (ref 31–37)
MCV RBC AUTO: 74.1 FL (ref 81–100)
MONOCYTES # BLD AUTO: 0.62 X10(3) UL (ref 0.1–1)
MONOCYTES NFR BLD AUTO: 9.5 %
NEUTROPHIL ABS PRELIM: 5.37 X10 (3) UL (ref 1.3–6.7)
NEUTROPHILS # BLD AUTO: 5.37 X10(3) UL (ref 1.3–6.7)
NEUTROPHILS NFR BLD AUTO: 82.2 %
OSMOLALITY SERPL CALC.SUM OF ELEC: 300 MOSM/KG (ref 275–295)
P AXIS: 85 DEGREES
P-R INTERVAL: 158 MS
PLATELET # BLD AUTO: 174 10(3)UL (ref 150–450)
POTASSIUM SERPL-SCNC: 4.7 MMOL/L (ref 3.6–5.1)
Q-T INTERVAL: 380 MS
QRS DURATION: 74 MS
QTC CALCULATION (BEZET): 433 MS
R AXIS: 62 DEGREES
RBC # BLD AUTO: 3.2 X10(6)UL (ref 3.8–5.1)
RED CELL DISTRIBUTION WIDTH-SD: 57.1 FL (ref 35.1–46.3)
SODIUM SERPL-SCNC: 143 MMOL/L (ref 136–144)
T AXIS: 66 DEGREES
VENTRICULAR RATE: 78 BPM
WBC # BLD AUTO: 6.5 X10(3) UL (ref 4–13)

## 2018-08-20 PROCEDURE — 99232 SBSQ HOSP IP/OBS MODERATE 35: CPT | Performed by: INTERNAL MEDICINE

## 2018-08-20 PROCEDURE — 99223 1ST HOSP IP/OBS HIGH 75: CPT | Performed by: INTERNAL MEDICINE

## 2018-08-20 RX ORDER — MAGNESIUM OXIDE 400 MG (241.3 MG MAGNESIUM) TABLET
400 TABLET ONCE
Status: COMPLETED | OUTPATIENT
Start: 2018-08-20 | End: 2018-08-20

## 2018-08-20 RX ORDER — ACETAMINOPHEN 500 MG
500 TABLET ORAL EVERY 6 HOURS PRN
Status: DISCONTINUED | OUTPATIENT
Start: 2018-08-20 | End: 2018-08-22

## 2018-08-20 RX ORDER — ALPRAZOLAM 0.25 MG/1
0.25 TABLET ORAL 2 TIMES DAILY PRN
Status: DISCONTINUED | OUTPATIENT
Start: 2018-08-20 | End: 2018-08-22

## 2018-08-20 RX ORDER — SODIUM BICARBONATE 325 MG/1
1300 TABLET ORAL 3 TIMES DAILY
Status: DISCONTINUED | OUTPATIENT
Start: 2018-08-20 | End: 2018-08-28

## 2018-08-20 RX ORDER — ACETAMINOPHEN 500 MG
TABLET ORAL EVERY 6 HOURS PRN
Status: DISCONTINUED | OUTPATIENT
Start: 2018-08-20 | End: 2018-08-20 | Stop reason: SDUPTHER

## 2018-08-20 RX ORDER — ACETAMINOPHEN 500 MG
1000 TABLET ORAL EVERY 6 HOURS PRN
Status: DISCONTINUED | OUTPATIENT
Start: 2018-08-20 | End: 2018-08-22

## 2018-08-20 RX ORDER — MAGNESIUM OXIDE 400 MG (241.3 MG MAGNESIUM) TABLET
2 TABLET NIGHTLY PRN
Status: DISCONTINUED | OUTPATIENT
Start: 2018-08-20 | End: 2018-08-20

## 2018-08-20 RX ORDER — MELATONIN
3 NIGHTLY PRN
Status: DISCONTINUED | OUTPATIENT
Start: 2018-08-20 | End: 2018-09-01

## 2018-08-20 RX ORDER — TRAZODONE HYDROCHLORIDE 50 MG/1
25 TABLET ORAL NIGHTLY PRN
Status: DISCONTINUED | OUTPATIENT
Start: 2018-08-20 | End: 2018-09-01

## 2018-08-20 NOTE — PROGRESS NOTES
1328--HAD A 6 SECONDS RUN OF /MIN. AT THAT TIME PATIENT WAS LYING IN BED AS PCT WAS EMPTYING HER ILEOSTOMY . PATIENT WAS ASYMPTOMATIC. .    1400--TEXT PAGED DR. SALCEDO ABOUT SVT. WAITING FOR RESPONSE  3625-- [address] [place] [address] ([place])  3169--DR. Jonah Raman

## 2018-08-20 NOTE — CONSULTS
[facility] Cardiology  Report of Consultation    Rik Mukherjee Patient Status:  Inpatient    1934 MRN [id number]   [facility] 3NW-A Attending Tiffanie Velásquez MD   Hosp Day # 2 PCP Pranav Molina MD     Reason for Consult tia   • Thalassemia minor    • THALLASEMIA    • Unspecified essential hypertension    • Unspecified sleep apnea psg 8/5/13 TX 8-27-13    AHI 52/ supine 101/ sao2 88%  CPAP 11 [facility]   • Visual impairment    • [facility]        Social History: Ativan [Lorazepam]      CONFUSION    Comment:Increased anxiety and confusion  Aspirin Cr [Aspirin]    BLEEDING  Ciprofloxacin               Comment:Itching  Flagyl [Metronidazo*        Comment:Itching  Hydrocodone-Acetami*    DIZZINESS  Msg [Monosodium 25*   CREATSERUM  2.59*  2.47*  2.35*  1.99*   GFRAA  19*  20*  21*  26*   GFRNAA  16*  17*  18*  23*   CA  7.8*  7.5*  6.9*  6.6*   ALB  3.3*  3.5   --    --    NA  140  132*  138  143   K  4.7  5.1  4.4  4.7   CL  110  103  111  116*   CO2  18.0*  18.0*

## 2018-08-20 NOTE — PROGRESS NOTES
[facility]    Progress Note    Real Frost Patient Status:  Inpatient    1934 MRN [id number]   [facility] 3NW-A Attending Malka Junior MD   Hosp Day # 2 PCP Paulette Parham MD         Subjective:   Real Frost is 08/07/2015   CRP 0.51 08/07/2015   MG 1.8 08/02/2018   PHOS 1.5 (L) 07/31/2018   TROP <0.046 05/11/2017   B12 >2,000 (H) 08/02/2018       Xr Knee (1 Or 2 Views), Right (cpt=73560)    Result Date: 8/18/2018  CONCLUSION:  Negative.     Dictated by: Jacqueline Law 8/18/2018 at 16:09     Approved by: MD Lily Morgan PA-C  Department of Urology  [facility]  8/20/2018

## 2018-08-20 NOTE — PHYSICAL THERAPY NOTE
PHYSICAL THERAPY EVALUATION - INPATIENT     Room Number: 321/321-A  Evaluation Date: 8/20/2018  Type of Evaluation: Initial  Physician Order: PT Eval and Treat    Presenting Problem: Urinary retention, hydronephrosis, and intractable N/V  Reason for List  Principal Problem:    Intractable vomiting with nausea, unspecified vomiting type  Active Problems:    Rectal cancer (HCC)    Hyponatremia    Anemia    Intractable vomiting with nausea    Urinary retention    Hydronephrosis, unspecified hydronephrosi OTHER SURGICAL HISTORY      Comment: colon resection x 2  2003: OTHER SURGICAL HISTORY      Comment: vulvectomy per Dr. José Renner  No date: OTHER SURGICAL HISTORY      Comment: perirectal abcesses  10-07: OTHER SURGICAL HISTORY      Comment: hip fracture    H ankle 4/5  BALANCE  Static Sitting: Fair -  Dynamic Sitting: Poor +  Static Standing: Not tested  Dynamic Standing: Not tested    ADDITIONAL TESTS                                    NEUROLOGICAL FINDINGS  Neurological Findings: Sensation           Sensatio Provided:  Bed mobility  Body mechanics  Functional activity tolerated  Posture  Transfer training    Patient End of Session: In bed;Needs met;Call light within reach;RN aware of session/findings; All patient questions and concerns addressed; Alarm set    AS DISCHARGE RECOMMENDATIONS  PT Discharge Recommendations: Sub-acute rehabilitation; Other (Comment) (c ELOS 12-14 days)    PLAN  PT Treatment Plan: Bed mobility; Body mechanics; Endurance; Patient education;Gait training;Strengthening;Transfer training; Juve Valencia

## 2018-08-20 NOTE — PROGRESS NOTES
Heme/Onc Progress Note    Patient Name: Jordana Huffman   YOB: 1934   Medical Record Number: [id number]   CSN: [id number]   Attending Physician: Marilou Vasquez M.D. Subjective:  No major output through rectum. Nunes in place.   Elba Leos 83   Temp 98 °F (36.7 °C) (Oral)   Resp 18   Ht 1.626 m (5' 4\")   Wt 55.8 kg (123 lb)   SpO2 93%   BMI 21.11 kg/m²   HEENT: EOMs intact. PERRL. Oropharynx is clear. Neck: No JVD. No palpable lymphadenopathy. Neck is supple.   Chest: Clear to auscultation 1.00 x10(3) uL   Neutrophil % 82.2 %   Lymphocyte % 5.2 %   Monocyte % 9.5 %   Eosinophil % 2.0 %   Basophil % 0.5 %   Immature Granulocyte % 0.6 %       Radiology:  CT abdomen and pelvis:  CONCLUSION:  There is a known right sided ischiorectal mass measur with new distension of recto sigmoid stump on CT. Declined surgical resection in the past given it's potential complexity (pevic exenteration would have been required).  Will ask GI to see. Patient is requesting [facility].   May need stent to deal with

## 2018-08-20 NOTE — CONSULTS
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   Currently, resection x 2  2003: OTHER SURGICAL HISTORY      Comment: vulvectomy per Dr. Sheryl Rascon  No date: OTHER SURGICAL HISTORY      Comment: perirectal abcesses  10-07: OTHER SURGICAL HISTORY      Comment: hip fracture  Medications:   ALPRAZolam (XANAX) tab 0.25 mg alcohol on social occasions; The patient has no history of IV drug use or other illicit substances. FamHx: The patient has no family history of colon cancer or other gastrointestinal malignancies;   No family history of ulcer disease, or inflammatory bowel WBC 6.5 08/20/2018   HGB 7.5 08/20/2018   HCT 23.7 08/20/2018   .0 08/20/2018   CREATSERUM 1.99 08/20/2018   BUN 25 08/20/2018    08/20/2018   K 4.7 08/20/2018    08/20/2018   CO2 19.0 08/20/2018   GLU 86 08/20/2018   CA 6.6 08/20/2018 density, or significant focal lesion.    BILIARY:  No visible dilatation or calcification.    PANCREAS:  No lesion, fluid collection, ductal dilatation, or atrophy.    SPLEEN:  No enlargement or focal lesion.    KIDNEYS:  .  There is mild to moderate bilat cephalocaudal.  ABDOMINAL WALL:  No mass or hernia.    Ileostomy.  Right lower quadrant.   URINARY BLADDER:  No visible focal wall thickening, lesion, or calculus.    PELVIC NODES:  No adenopathy.    PELVIC ORGANS:  No visible mass.  Pelvic organs appropria within her pelvis s/p chemo/radiation (completed 1+ years ago) admitted to the hospital with nausea/vomiting, decreased urine output, and abdominal distention after 1 dose of Lomotil used to help with chronic high-volume ileostomy output.  CT A/P without co

## 2018-08-20 NOTE — CONSULTS
[facility]  Report of Consultation    Kip Abo Patient Status:  Inpatient    1934 MRN [id number]   [facility] 3NW-A Attending Anahi Mathur MD   Hosp Day # 2 PCP Meg Greco MD       Assessment / Plan:    1) TANIA- du • Hearing impairment    • High blood pressure    • History of blood transfusion    • IBS (irritable bowel syndrome)    • Insomnia    • KIDNEY STONE    • Macular degeneration    • Migraines     as a child   • Osteoarthrosis, unspecified whether generaliz reports that she quit smoking about 43 years ago. Her smoking use included Cigarettes. She has a 25.00 pack-year smoking history. She has never used smokeless tobacco. She reports that she drinks alcohol. She reports that she does not use drugs.     Hoa Abrams Right arm)   Pulse 105   Temp 98.4 °F (36.9 °C) (Oral)   Resp 18   Ht 64\"   Wt 123 lb   SpO2 94%   BMI 21.11 kg/m²   Temp (24hrs), Av.2 °F (36.8 °C), Min:97.3 °F (36.3 °C), Max:98.9 °F (37.2 °C)       Intake/Output Summary (Last 24 hours) at 18

## 2018-08-20 NOTE — CM/SW NOTE
MSW received fax: Patient is current with [facility] for RN, PT/OT and Aide

## 2018-08-20 NOTE — PLAN OF CARE
Assumed care at 1930  A/ox4, but very forgetful, bed alarm in place. Anxious at times. Medicated routinely with morphine for rectal pain, pt declines po pain medication. vss on room air. nsr on tele.    DISCHARGE PLANNING    • Discharge to home or other fac

## 2018-08-21 ENCOUNTER — APPOINTMENT (OUTPATIENT)
Dept: CV DIAGNOSTICS | Facility: HOSPITAL | Age: 83
DRG: 375 | End: 2018-08-21
Attending: INTERNAL MEDICINE
Payer: MEDICARE

## 2018-08-21 ENCOUNTER — ANESTHESIA (OUTPATIENT)
Dept: ENDOSCOPY | Facility: HOSPITAL | Age: 83
DRG: 375 | End: 2018-08-21
Payer: MEDICARE

## 2018-08-21 ENCOUNTER — ANESTHESIA EVENT (OUTPATIENT)
Dept: ENDOSCOPY | Facility: HOSPITAL | Age: 83
DRG: 375 | End: 2018-08-21
Payer: MEDICARE

## 2018-08-21 LAB
ANION GAP SERPL CALC-SCNC: 8 MMOL/L (ref 0–18)
BASOPHILS # BLD AUTO: 0.03 X10(3) UL (ref 0–0.1)
BASOPHILS NFR BLD AUTO: 0.5 %
BUN BLD-MCNC: 19 MG/DL (ref 8–20)
BUN/CREAT SERPL: 10.1 (ref 10–20)
CALCIUM BLD-MCNC: 6.7 MG/DL (ref 8.3–10.3)
CHLORIDE SERPL-SCNC: 111 MMOL/L (ref 101–111)
CO2 SERPL-SCNC: 22 MMOL/L (ref 22–32)
CREAT BLD-MCNC: 1.88 MG/DL (ref 0.55–1.02)
EOSINOPHIL # BLD AUTO: 0.16 X10(3) UL (ref 0–0.3)
EOSINOPHIL NFR BLD AUTO: 2.4 %
ERYTHROCYTE [DISTWIDTH] IN BLOOD BY AUTOMATED COUNT: 21.7 % (ref 11.5–16)
GLUCOSE BLD-MCNC: 122 MG/DL (ref 70–99)
HAV IGM SER QL: 2.4 MG/DL (ref 1.8–2.5)
HCT VFR BLD AUTO: 22.7 % (ref 34–50)
HGB BLD-MCNC: 7.4 G/DL (ref 12–16)
IMMATURE GRANULOCYTE COUNT: 0.05 X10(3) UL (ref 0–1)
IMMATURE GRANULOCYTE RATIO %: 0.8 %
LYMPHOCYTES # BLD AUTO: 0.32 X10(3) UL (ref 0.9–4)
LYMPHOCYTES NFR BLD AUTO: 4.9 %
MCH RBC QN AUTO: 23.1 PG (ref 27–33.2)
MCHC RBC AUTO-ENTMCNC: 32.6 G/DL (ref 31–37)
MCV RBC AUTO: 70.7 FL (ref 81–100)
MONOCYTES # BLD AUTO: 0.55 X10(3) UL (ref 0.1–1)
MONOCYTES NFR BLD AUTO: 8.3 %
NEUTROPHIL ABS PRELIM: 5.48 X10 (3) UL (ref 1.3–6.7)
NEUTROPHILS # BLD AUTO: 5.48 X10(3) UL (ref 1.3–6.7)
NEUTROPHILS NFR BLD AUTO: 83.1 %
OSMOLALITY SERPL CALC.SUM OF ELEC: 296 MOSM/KG (ref 275–295)
PHOSPHATE SERPL-MCNC: 2 MG/DL (ref 2.5–4.9)
PLATELET # BLD AUTO: 180 10(3)UL (ref 150–450)
POTASSIUM SERPL-SCNC: 3.6 MMOL/L (ref 3.6–5.1)
RBC # BLD AUTO: 3.21 X10(6)UL (ref 3.8–5.1)
RED CELL DISTRIBUTION WIDTH-SD: 53.1 FL (ref 35.1–46.3)
SODIUM SERPL-SCNC: 141 MMOL/L (ref 136–144)
WBC # BLD AUTO: 6.6 X10(3) UL (ref 4–13)

## 2018-08-21 PROCEDURE — 99232 SBSQ HOSP IP/OBS MODERATE 35: CPT | Performed by: INTERNAL MEDICINE

## 2018-08-21 PROCEDURE — 93306 TTE W/DOPPLER COMPLETE: CPT | Performed by: INTERNAL MEDICINE

## 2018-08-21 PROCEDURE — 0DJD8ZZ INSPECTION OF LOWER INTESTINAL TRACT, VIA NATURAL OR ARTIFICIAL OPENING ENDOSCOPIC: ICD-10-PCS | Performed by: STUDENT IN AN ORGANIZED HEALTH CARE EDUCATION/TRAINING PROGRAM

## 2018-08-21 PROCEDURE — 99233 SBSQ HOSP IP/OBS HIGH 50: CPT | Performed by: INTERNAL MEDICINE

## 2018-08-21 RX ORDER — SODIUM CHLORIDE, SODIUM LACTATE, POTASSIUM CHLORIDE, CALCIUM CHLORIDE 600; 310; 30; 20 MG/100ML; MG/100ML; MG/100ML; MG/100ML
INJECTION, SOLUTION INTRAVENOUS CONTINUOUS
Status: DISCONTINUED | OUTPATIENT
Start: 2018-08-21 | End: 2018-08-21

## 2018-08-21 RX ORDER — SODIUM CHLORIDE, SODIUM LACTATE, POTASSIUM CHLORIDE, CALCIUM CHLORIDE 600; 310; 30; 20 MG/100ML; MG/100ML; MG/100ML; MG/100ML
INJECTION, SOLUTION INTRAVENOUS CONTINUOUS
Status: CANCELLED | OUTPATIENT
Start: 2018-08-21

## 2018-08-21 RX ORDER — ONDANSETRON 2 MG/ML
4 INJECTION INTRAMUSCULAR; INTRAVENOUS AS NEEDED
Status: DISCONTINUED | OUTPATIENT
Start: 2018-08-21 | End: 2018-08-21 | Stop reason: HOSPADM

## 2018-08-21 RX ORDER — NALOXONE HYDROCHLORIDE 0.4 MG/ML
80 INJECTION, SOLUTION INTRAMUSCULAR; INTRAVENOUS; SUBCUTANEOUS AS NEEDED
Status: CANCELLED | OUTPATIENT
Start: 2018-08-21 | End: 2018-08-21

## 2018-08-21 RX ORDER — ONDANSETRON 2 MG/ML
4 INJECTION INTRAMUSCULAR; INTRAVENOUS AS NEEDED
Status: CANCELLED | OUTPATIENT
Start: 2018-08-21 | End: 2018-08-21

## 2018-08-21 RX ORDER — NALOXONE HYDROCHLORIDE 0.4 MG/ML
80 INJECTION, SOLUTION INTRAMUSCULAR; INTRAVENOUS; SUBCUTANEOUS AS NEEDED
Status: DISCONTINUED | OUTPATIENT
Start: 2018-08-21 | End: 2018-08-21 | Stop reason: HOSPADM

## 2018-08-21 NOTE — PLAN OF CARE
Pt a&o, forgetful at times, bed alarm on, fall mats in place. Melatonin PRN. Pt noted to have anxiety, not due for PRN xanax. On RA, no sob noted. Tele NSR 80's. Nunes intact, clear yellow output. C/o rectal pain, tylenol administered.  Active bowel sounds,

## 2018-08-21 NOTE — PROGRESS NOTES
[facility]  Nephrology Progress Note    Carrie Higuera Attending:  Jett Anderson MD       Assessment and Plan:    1) TANIA- due to volume depletion with modest PO intake + high output ostomy (which improved at SNF with IVF) exac by urinary retent beth       Labs:     Lab Results  Component Value Date   WBC 6.6 08/21/2018   HGB 7.4 08/21/2018   HCT 22.7 08/21/2018   .0 08/21/2018   CREATSERUM 1.88 08/21/2018   BUN 19 08/21/2018    08/21/2018   K 3.6 08/21/2018    08/21/2018   CO

## 2018-08-21 NOTE — CM/SW NOTE
Met with pt for dc planning assessment. She states she was just at [facility] for M[facility]ni 12 and would choose to return there for rehab. She lives alone and \"has no one\" but a spouse and brother are listed on her demographics.  She wishes not to discuss jennifer

## 2018-08-21 NOTE — ANESTHESIA PREPROCEDURE EVALUATION
PRE-OP EVALUATION    Patient Name: Dea Fabry    Pre-op Diagnosis: ADD ON    Procedure(s): FLEXIBLE SIGMOIDOSCOPY    Surgeon(s) and Role:     * Omayra De La Vega MD - Primary    Pre-op vitals reviewed.   Temp: 97.7 °F (36.5 °C)  Pulse: 77  Resp: DiphenhydrAMINE HCl (BENADRYL) injection 25 mg 25 mg Intravenous Once   Darbepoetin Orlando (ARANESP) 300 MCG/0.6ML injection 300 mcg 300 mcg Subcutaneous Q30 Days       Outpatient Medications:    No outpatient prescriptions have been marked as taking for the The patient is a 59-year-old female with chest pain.      REGADENOSON FINDINGS:  The regadenoson portion of this study was interpreted by Dr. Henrry Davis. The patient underwent intravenous infusion of regadenoson a dose of 0.4 mg.  The electrocardiographic respon Saman Treadwell MD (auto-released) From Order: [id number]  05/12/17 0939 Result Annie VICTORIA In process  05/13/17 8694 Result Trey Olmedo Preliminary  05/22/17 0915 Result Mark Pierre MD Final  Acknowledgement Info     For At Acknowledged By Obdulia Matias pain     Anemia, unspecified     Nausea     Stage 4 chronic kidney disease (HCC)     Acidosis     Hyponatremia     Anemia     Intractable vomiting with nausea     Intractable vomiting with nausea, unspecified vomiting type     Urinary retention     Hydrone Pulmonary      Breath sounds clear to auscultation bilaterally. Other findings            ASA: 3   Plan: MAC  NPO status verified and patient meets guidelines. Post-procedure pain management plan discussed with surgeon and patient.       Paul

## 2018-08-21 NOTE — PROGRESS NOTES
Patient back from GI lab in stable condition. Vss. Pt a&ox3. Pt on ra with 02 sats wnl. Lungs cta. Pt denies difficulty breathing or sob. Pt denies chest pain. Abdomen rounded but soft.  Pt passing small amount of flatus and loose stool through ileostomy ap

## 2018-08-21 NOTE — PROGRESS NOTES
Heme/Onc Progress Note    Patient Name: Naeem Velez   YOB: 1934   Medical Record Number: [id number]   CSN: [id number]   Attending Physician: Cathi Diop M.D. Subjective:  Slightly less anxious.   Trying to deal with her person in Other Encounters:   •  Darbepoetin Orlando (ARANESP) 300 MCG/0.6ML injection 300 mcg, 300 mcg, Subcutaneous, Q30 Days    Physical Examination:  General: Patient is alert and oriented x 3, not in acute distress.   Vital Signs: /63 (BP Location: Right a Range   Phosphorus 2.0 (L) 2.5 - 4.9 mg/dL   -CBC W/ DIFFERENTIAL   Collection Time: 08/21/18  5:22 AM   Result Value Ref Range   WBC 6.6 4.0 - 13.0 x10(3) uL   RBC 3.21 (L) 3.80 - 5.10 x10(6)uL   HGB 7.4 (L) 12.0 - 16.0 g/dL   HCT 22.7 (L) 34.0 - 50.0 %  has received erythropoietin in the past.  Iron stores intact a few weeks ago.  ?when last dose, suspect was given in AI.  No evidence of active blood loss.  CBC tomorrow  Will give epo here.     # Pelvic fractures: Due to insufficiency.  Seen by orthopedi

## 2018-08-21 NOTE — PROGRESS NOTES
Potassium phos tamara not compatible with Sodium bicarb gtt. Dr. Jatin Sauceda paged regarding if ok to stop sodium bicarb gtt while giving potassium phos tamara. 1607: Dr. Jatin Sauceda paged and stated ok to stop bicarb gtt to given potassium phos tamara.  Will continue t

## 2018-08-21 NOTE — ANESTHESIA POSTPROCEDURE EVALUATION
[address] Patient Status:  Inpatient   Age/Gender 80year old female MRN [id number]   Location [address] [place] [address]. Attending Valorie Tay MD   Hosp Day # 3 PCP Kamaljit Rojas MD       Anesthesia Post-op Note    Procedure

## 2018-08-21 NOTE — PROGRESS NOTES
[facility] Hospitalist Progress Note                                                                   [address]  7/28/1934    CC: FU weakness    Interval History:  - had 3 runs of narrow 21.7*   NEPRELIM  8.87*  5.99  5.37   WBC  9.9  7.2  6.5   PLT  228.0  179.0  174.0     Recent Labs   Lab  08/18/18   1426  08/19/18   0549  08/20/18   0543   GLU  99  80  86   BUN  38*  33*  25*   CREATSERUM  2.47*  2.35*  1.99*   GFRAA  20*  21*  26*   G

## 2018-08-21 NOTE — PROGRESS NOTES
[facility]    PATIENT'S NAME: Jarad Medina [place] KOBE   ATTENDING PHYSICIAN: Christian Quinones M.D.    PATIENT ACCOUNT #: [de-identified] LOCATION: [address] [place] [address] RECORD #: [id number] YOB: 1934   DATE OF SERVICE: 08/16/2018       CANCER feeling fullness in the pelvis. She has a relatively untenable social situation in that she lives alone. Her  lives in [place]. They have only been  a few years, and he has his own health issues and he is not much help for her.   She has no

## 2018-08-21 NOTE — CM/SW NOTE
MEt with pt briefly at the bedside prior to her scheduled procedure at 1100. She lives at [facility], alone and states that she has no one. She admits to needing more help. PT recc AI.  She asks that we discuss this at another time as she

## 2018-08-21 NOTE — PROGRESS NOTES
[facility] Cardiology Progress Note        Bossman Vasquez Patient Status:  Inpatient    1934 MRN [id number]   [facility] 3NW-A Attending Fabrizio Henderson MD   Hosp Day # 3 PCP Yun Jarrell MD     Subjective: Cath:      Labs:  HEM:  Recent Labs   Lab  08/16/18   1140  08/18/18   1426  08/19/18   0550  08/20/18   0543  08/21/18   0522   WBC  7.7  9.9  7.2  6.5  6.6   HGB  9.0*  8.5*  7.7*  7.5*  7.4*   PLT  267.0  228.0  179.0  174.0  180.0       Chem:  Recent L

## 2018-08-21 NOTE — PROGRESS NOTES
Pt resting in bed this am. Vss. Pt a&ox3. Pt on ra with 02 sats wnl. HR SR on tele with rate in the 70's this am. Pt denies chest pain. Denies n/v. Remains npo for procedure this am. Denies pain at present. Abdomen soft, bsx4 present on auscultation.  Ileos

## 2018-08-21 NOTE — OPERATIVE REPORT
Flex Sig Operative Report  Patient Name: Ludmila Heredia  Procedure: Sigmoidoscopy (not completed)  Indication: abnormal CT findings  Attending: Ivette Toro M.D. Consent: The risks, benefits, and alternatives were discussed with the patient / POA.

## 2018-08-22 ENCOUNTER — ANESTHESIA EVENT (OUTPATIENT)
Dept: ENDOSCOPY | Facility: HOSPITAL | Age: 83
DRG: 375 | End: 2018-08-22
Payer: MEDICARE

## 2018-08-22 LAB
ANION GAP SERPL CALC-SCNC: 6 MMOL/L (ref 0–18)
BASOPHILS # BLD AUTO: 0.03 X10(3) UL (ref 0–0.1)
BASOPHILS NFR BLD AUTO: 0.4 %
BUN BLD-MCNC: 17 MG/DL (ref 8–20)
BUN/CREAT SERPL: 9.4 (ref 10–20)
CALCIUM BLD-MCNC: 6.7 MG/DL (ref 8.3–10.3)
CHLORIDE SERPL-SCNC: 107 MMOL/L (ref 101–111)
CO2 SERPL-SCNC: 28 MMOL/L (ref 22–32)
CREAT BLD-MCNC: 1.81 MG/DL (ref 0.55–1.02)
EOSINOPHIL # BLD AUTO: 0.22 X10(3) UL (ref 0–0.3)
EOSINOPHIL NFR BLD AUTO: 3 %
ERYTHROCYTE [DISTWIDTH] IN BLOOD BY AUTOMATED COUNT: 21.7 % (ref 11.5–16)
GLUCOSE BLD-MCNC: 108 MG/DL (ref 70–99)
HAV IGM SER QL: 1.8 MG/DL (ref 1.8–2.5)
HCT VFR BLD AUTO: 23.8 % (ref 34–50)
HGB BLD-MCNC: 7.8 G/DL (ref 12–16)
IMMATURE GRANULOCYTE COUNT: 0.06 X10(3) UL (ref 0–1)
IMMATURE GRANULOCYTE RATIO %: 0.8 %
LYMPHOCYTES # BLD AUTO: 0.36 X10(3) UL (ref 0.9–4)
LYMPHOCYTES NFR BLD AUTO: 4.8 %
MCH RBC QN AUTO: 23.1 PG (ref 27–33.2)
MCHC RBC AUTO-ENTMCNC: 32.8 G/DL (ref 31–37)
MCV RBC AUTO: 70.6 FL (ref 81–100)
MONOCYTES # BLD AUTO: 0.59 X10(3) UL (ref 0.1–1)
MONOCYTES NFR BLD AUTO: 7.9 %
NEUTROPHIL ABS PRELIM: 6.19 X10 (3) UL (ref 1.3–6.7)
NEUTROPHILS # BLD AUTO: 6.19 X10(3) UL (ref 1.3–6.7)
NEUTROPHILS NFR BLD AUTO: 83.1 %
OSMOLALITY SERPL CALC.SUM OF ELEC: 294 MOSM/KG (ref 275–295)
PHOSPHATE SERPL-MCNC: 2.8 MG/DL (ref 2.5–4.9)
PLATELET # BLD AUTO: 197 10(3)UL (ref 150–450)
POTASSIUM SERPL-SCNC: 3.9 MMOL/L (ref 3.6–5.1)
RBC # BLD AUTO: 3.37 X10(6)UL (ref 3.8–5.1)
RED CELL DISTRIBUTION WIDTH-SD: 53.1 FL (ref 35.1–46.3)
SODIUM SERPL-SCNC: 141 MMOL/L (ref 136–144)
WBC # BLD AUTO: 7.5 X10(3) UL (ref 4–13)

## 2018-08-22 PROCEDURE — 99232 SBSQ HOSP IP/OBS MODERATE 35: CPT | Performed by: INTERNAL MEDICINE

## 2018-08-22 RX ORDER — ACETAMINOPHEN 325 MG/1
650 TABLET ORAL EVERY 4 HOURS PRN
Status: DISCONTINUED | OUTPATIENT
Start: 2018-08-22 | End: 2018-09-01

## 2018-08-22 RX ORDER — ALPRAZOLAM 0.5 MG/1
0.5 TABLET ORAL 2 TIMES DAILY PRN
Status: DISCONTINUED | OUTPATIENT
Start: 2018-08-22 | End: 2018-08-22

## 2018-08-22 RX ORDER — ACETAMINOPHEN 325 MG/1
325 TABLET ORAL EVERY 4 HOURS PRN
Status: DISCONTINUED | OUTPATIENT
Start: 2018-08-22 | End: 2018-09-01

## 2018-08-22 RX ORDER — MORPHINE SULFATE 4 MG/ML
4 INJECTION, SOLUTION INTRAMUSCULAR; INTRAVENOUS EVERY 2 HOUR PRN
Status: DISCONTINUED | OUTPATIENT
Start: 2018-08-22 | End: 2018-09-01

## 2018-08-22 RX ORDER — DEXTROSE, SODIUM CHLORIDE, AND POTASSIUM CHLORIDE 5; .45; .15 G/100ML; G/100ML; G/100ML
INJECTION INTRAVENOUS CONTINUOUS
Status: DISCONTINUED | OUTPATIENT
Start: 2018-08-22 | End: 2018-08-24

## 2018-08-22 RX ORDER — ALPRAZOLAM 0.5 MG/1
0.5 TABLET ORAL 3 TIMES DAILY PRN
Status: DISCONTINUED | OUTPATIENT
Start: 2018-08-22 | End: 2018-08-23

## 2018-08-22 RX ORDER — MORPHINE SULFATE 4 MG/ML
2 INJECTION, SOLUTION INTRAMUSCULAR; INTRAVENOUS EVERY 2 HOUR PRN
Status: DISCONTINUED | OUTPATIENT
Start: 2018-08-22 | End: 2018-09-01

## 2018-08-22 NOTE — PLAN OF CARE
Pt a&o, agitated and uncooperative with staff, bed alarm on. Xanax administered, melatonin PRN. Pt c/o pain, tylenol administered per request for back pain, stated she was unaware of norco but has been refusing norco throughout day shift.  Morphine administ

## 2018-08-22 NOTE — PHYSICAL THERAPY NOTE
IP PT attempted. Pt politely refusing despite encouragement and education on benefits of OOB mobility to prevent further deconditioning and atrophy. Pt reports feeling \"dizzy\" at rest from morphine and fatigue from having not slept well .  Pt agreeable to

## 2018-08-22 NOTE — PROGRESS NOTES
Heme/Onc Progress Note    Patient Name: Crystal Mora   YOB: 1934   Medical Record Number: [id number]   CSN: [id number]   Attending Physician: Jerod Herrera M.D. Subjective:  Having pain in rectum.   Feels dose of pain meds is martin mg, 4 mg, Intravenous, Q4H PRN  •  LORazepam (ATIVAN) injection 0.5 mg, 0.5 mg, Intravenous, Once  •  ondansetron HCl (ZOFRAN) injection 4 mg, 4 mg, Intravenous, Q6H PRN    Facility-Administered Medications Ordered in Other Encounters:   •  Darbepoetin correction Result Value Ref Range   WBC 7.5 4.0 - 13.0 x10(3) uL   RBC 3.37 (L) 3.80 - 5.10 x10(6)uL   HGB 7.8 (L) 12.0 - 16.0 g/dL   HCT 23.8 (L) 34.0 - 50.0 %   .0 150.0 - 450.0 10(3)uL   MCV 70.6 (L) 81.0 - 100.0 fL   MCH 23.1 (L) 27.0 - 33.2 pg   MCHC 32 thalassemia.  has received erythropoietin in the past.  Iron stores intact a few weeks ago.  ?when last dose, suspect was given in AI. No evidence of active blood loss. [address] being followed. Will give epo here.     # Pelvic fractures: Due to insufficiency.

## 2018-08-22 NOTE — PROGRESS NOTES
[facility] Hospitalist Progress Note                                                                   [address]  7/28/1934    CC: FU weakness    Interval History:    Pt seen in afternoon 5. 37  5.48   WBC  7.2  6.5  6.6   PLT  179.0  174.0  180.0     Recent Labs   Lab  08/19/18   0549  08/20/18   0543  08/21/18   0522   GLU  80  86  122*   BUN  33*  25*  19   CREATSERUM  2.35*  1.99*  1.88*   GFRAA  21*  26*  28*   GFRNAA  18*  23*  24*   C

## 2018-08-22 NOTE — OCCUPATIONAL THERAPY NOTE
OCCUPATIONAL THERAPY EVALUATION - INPATIENT     Room Number: 321/321-A  Evaluation Date: 8/22/2018  Type of Evaluation: Initial  Presenting Problem: n/v, rectal tumor    Physician Order: IP Consult to Occupational Therapy  Reason for Therapy: ADL/IADL Dysf Intractable vomiting with nausea, unspecified vomiting type  Active Problems:    Rectal cancer (HCC)    Hyponatremia    Anemia    Intractable vomiting with nausea    Urinary retention    Hydronephrosis, unspecified hydronephrosis type    Nausea and vomitin ILEOSTOMY/JEJUNOSTOMY,NONTUBE  No date: OTHER SURGICAL HISTORY      Comment: colon resection x 2  2003: OTHER SURGICAL HISTORY      Comment: vulvectomy per Dr. Anne Chavarria  No date: OTHER SURGICAL HISTORY      Comment: perirectal abcesses  10-07: OTHER SURGICAL limits     Upper extremity strength is within functional limits     COORDINATION  Gross Motor    WFL    Fine Motor    WFL      ADDITIONAL TESTS                                    NEUROLOGICAL FINDINGS                   ACTIVITY TOLERANCE   Room air  No andre following performance deficits: endurance, safety, pain, and activity tolerance. These deficits impact the patient’s ability to participate in ADLs, instrumental activities of daily living, rest and sleep, work, leisure and social participation.      The pa

## 2018-08-22 NOTE — ANESTHESIA PREPROCEDURE EVALUATION
PRE-OP EVALUATION    Patient Name: Naeem Velez    Pre-op Diagnosis: ADD ON    Procedure(s): FLEXIBLE SIGMOIDOSCOPY    Surgeon(s) and Role:     * Kyree De La Vega MD - Primary    Pre-op vitals reviewed.   Temp: 98 °F (36.7 °C)  Pulse: 83  Resp: 1 Antibiotics      Anesthesia Evaluation    Patient summary reviewed.     Anesthetic Complications  (-) history of anesthetic complications         GI/Hepatic/Renal      (+) GERD and well controlled      (+) chronic renal disease and CRI          (+) Crohn's mCi technetium-99m sestamibi at rest and 30.3 mCi at stress.      PERFUSION IMAGING:  The perfusion images revealed no reversible defect on myocardial SPECT scan.     GATED SPECT ANALYSIS:  The calculated left ventricular ejection fraction was 88%.  The gat available  MyChart Results Release     MyChart Status: Active Results Release        ECG reviewed.                                                  Endo/Other               (+) anemia    (+) thalassemia               Pulmonary                           Neur date: COLECTOMY  No date: COLONOSCOPY      Comment: 6/09 no dysplasia.   Repeat 2014 5/13/2014: COLONOSCOPY      Comment: Procedure: COLONOSCOPY;  Surgeon: Kaylah Gerardo MD;  Location: [address] [place] [address]  No date: D & C  No date: ILEOSTOMY/JEJU

## 2018-08-22 NOTE — PHYSICAL THERAPY NOTE
PHYSICAL THERAPY TREATMENT NOTE - INPATIENT    Room Number: 321/321-A     Session: 1   Number of Visits to Meet Established Goals: 5    Presenting Problem: Urinary retention, hydronephrosis, and intractable N/V       History related to current admission: Rectal cancer (HCC)    Hyponatremia    Anemia    Intractable vomiting with nausea    Urinary retention    Hydronephrosis, unspecified hydronephrosis type    Nausea and vomiting    Cancer associated pain    Anemia, chronic disease    CKD (chronic kidney di resection x 2  2003: OTHER SURGICAL HISTORY      Comment: vulvectomy per Dr. Ciaran Laguerre  No date: OTHER SURGICAL HISTORY      Comment: perirectal abcesses  10-07: OTHER SURGICAL HISTORY      Comment: hip fracture    SUBJECTIVE  \"It's so cold in here, can we t standing/gait d/t pn and LH    Skilled Therapy Provided: RN approved session. Pt presents in sidelying in bed. Supervision sup>sit c increased time and HOB raised. Pt able to scoot to EOB c supervision.  Pt reports dizziness not resolving after sitting EO rehab is recommended for 12-14 days. After this period of rehabilitation patient should achieve supervision level in bed mobility, t/f and amb using a RW. Following this stay at [place] [address], pt will benefit from transition to LTC or potentially prison.      DISCHARGE

## 2018-08-22 NOTE — CM/SW NOTE
Pt refused therapy today so far. Will need to participate in therapy in order to be considered for AI at the [facility].      Paulie Garner RN, [facility]    [phone] pgr: [id number]

## 2018-08-23 ENCOUNTER — APPOINTMENT (OUTPATIENT)
Dept: GENERAL RADIOLOGY | Facility: HOSPITAL | Age: 83
DRG: 375 | End: 2018-08-23
Attending: INTERNAL MEDICINE
Payer: MEDICARE

## 2018-08-23 ENCOUNTER — ANESTHESIA (OUTPATIENT)
Dept: ENDOSCOPY | Facility: HOSPITAL | Age: 83
DRG: 375 | End: 2018-08-23
Payer: MEDICARE

## 2018-08-23 LAB
ANION GAP SERPL CALC-SCNC: 5 MMOL/L (ref 0–18)
BASOPHILS # BLD AUTO: 0.03 X10(3) UL (ref 0–0.1)
BASOPHILS NFR BLD AUTO: 0.5 %
BUN BLD-MCNC: 13 MG/DL (ref 8–20)
BUN/CREAT SERPL: 7.1 (ref 10–20)
CALCIUM BLD-MCNC: 6.1 MG/DL (ref 8.3–10.3)
CHLORIDE SERPL-SCNC: 107 MMOL/L (ref 101–111)
CO2 SERPL-SCNC: 25 MMOL/L (ref 22–32)
CREAT BLD-MCNC: 1.83 MG/DL (ref 0.55–1.02)
EOSINOPHIL # BLD AUTO: 0.26 X10(3) UL (ref 0–0.3)
EOSINOPHIL NFR BLD AUTO: 4.3 %
ERYTHROCYTE [DISTWIDTH] IN BLOOD BY AUTOMATED COUNT: 21.4 % (ref 11.5–16)
GLUCOSE BLD-MCNC: 105 MG/DL (ref 70–99)
HAV IGM SER QL: 1.6 MG/DL (ref 1.8–2.5)
HCT VFR BLD AUTO: 23.1 % (ref 34–50)
HGB BLD-MCNC: 7.4 G/DL (ref 12–16)
IMMATURE GRANULOCYTE COUNT: 0.08 X10(3) UL (ref 0–1)
IMMATURE GRANULOCYTE RATIO %: 1.3 %
LYMPHOCYTES # BLD AUTO: 0.48 X10(3) UL (ref 0.9–4)
LYMPHOCYTES NFR BLD AUTO: 8 %
MCH RBC QN AUTO: 23.1 PG (ref 27–33.2)
MCHC RBC AUTO-ENTMCNC: 32 G/DL (ref 31–37)
MCV RBC AUTO: 72 FL (ref 81–100)
MONOCYTES # BLD AUTO: 0.63 X10(3) UL (ref 0.1–1)
MONOCYTES NFR BLD AUTO: 10.5 %
NEUTROPHIL ABS PRELIM: 4.54 X10 (3) UL (ref 1.3–6.7)
NEUTROPHILS # BLD AUTO: 4.54 X10(3) UL (ref 1.3–6.7)
NEUTROPHILS NFR BLD AUTO: 75.4 %
OSMOLALITY SERPL CALC.SUM OF ELEC: 284 MOSM/KG (ref 275–295)
PHOSPHATE SERPL-MCNC: 2.3 MG/DL (ref 2.5–4.9)
PLATELET # BLD AUTO: 188 10(3)UL (ref 150–450)
POTASSIUM SERPL-SCNC: 4.1 MMOL/L (ref 3.6–5.1)
RBC # BLD AUTO: 3.21 X10(6)UL (ref 3.8–5.1)
RED CELL DISTRIBUTION WIDTH-SD: 53.9 FL (ref 35.1–46.3)
SODIUM SERPL-SCNC: 137 MMOL/L (ref 136–144)
WBC # BLD AUTO: 6 X10(3) UL (ref 4–13)

## 2018-08-23 PROCEDURE — 99233 SBSQ HOSP IP/OBS HIGH 50: CPT | Performed by: INTERNAL MEDICINE

## 2018-08-23 PROCEDURE — 99232 SBSQ HOSP IP/OBS MODERATE 35: CPT | Performed by: INTERNAL MEDICINE

## 2018-08-23 PROCEDURE — 76000 FLUOROSCOPY <1 HR PHYS/QHP: CPT | Performed by: INTERNAL MEDICINE

## 2018-08-23 PROCEDURE — 0DJD8ZZ INSPECTION OF LOWER INTESTINAL TRACT, VIA NATURAL OR ARTIFICIAL OPENING ENDOSCOPIC: ICD-10-PCS | Performed by: INTERNAL MEDICINE

## 2018-08-23 RX ORDER — MAGNESIUM SULFATE HEPTAHYDRATE 40 MG/ML
2 INJECTION, SOLUTION INTRAVENOUS ONCE
Status: COMPLETED | OUTPATIENT
Start: 2018-08-23 | End: 2018-08-23

## 2018-08-23 RX ORDER — ALPRAZOLAM 1 MG/1
1 TABLET ORAL 4 TIMES DAILY PRN
Status: DISCONTINUED | OUTPATIENT
Start: 2018-08-23 | End: 2018-09-01

## 2018-08-23 RX ORDER — ACETAMINOPHEN 325 MG/1
TABLET ORAL
Status: DISPENSED
Start: 2018-08-23 | End: 2018-08-24

## 2018-08-23 NOTE — PROGRESS NOTES
[facility] Hospitalist Progress Note                                                                   [address]  7/28/1934    CC: FU weakness    Interval History:    No CP/SOB, feels con 21.7*  21.7*  21.4*   NEPRELIM  5.48  6.19  4.54   WBC  6.6  7.5  6.0   PLT  180.0  197.0  188.0     Recent Labs   Lab  08/21/18   0522  08/22/18   0535  08/23/18   0511  08/23/18   0621   GLU  122*  108*  105*   --    BUN  19  17  13   --    CREATSERUM

## 2018-08-23 NOTE — OPERATIVE REPORT
Medardo Wilhelm Patient Status:  Inpatient    1934 MRN [id number]   [facility] ENDOSCOPY Attending Marti Wisdom MD   Hosp Day # 5 PCP Clarence Bianchi MD     PREOPERATIVE DIAGNOSIS/INDICATION:  Ischioanal/rectal mass, severe dila

## 2018-08-23 NOTE — ANESTHESIA POSTPROCEDURE EVALUATION
[address] Patient Status:  Inpatient   Age/Gender 80year old female MRN [id number]   Location [address] [place] [address]. Attending Lennie Parra MD   Hosp Day # 5 PCP Jay Harvey MD       Anesthesia Post-op Note    Procedure

## 2018-08-23 NOTE — PROGRESS NOTES
[facility]  Report of GI Consultation    Dolores Calvillo Patient Status:  Inpatient    1934 MRN [id number]   [facility] 3NW-A Attending Annemarie Sullivan MD   [facility] Day # 4 PCP Trung Almanzar MD     Date of Admission:  2018 confusion  Aspirin Cr [Aspirin]    BLEEDING  Ciprofloxacin               Comment:Itching  Flagyl [Metronidazo*        Comment:Itching  Hydrocodone-Acetami*    DIZZINESS  Msg [Monosodium Glu*        Comment:headache  Nsaids                      Comment: Will plan for wire-guided dilation under fluoro with Dr Jonathan Antonio. Recommendations:   - Flex sig with fluoro tomorrow under MAC, no enemas needed   - Informed consent was obtained from the patient for the above procedure.  She was told indications, nature

## 2018-08-23 NOTE — PROGRESS NOTES
[facility] Hospitalist Progress Note                                                                   [address]  7/28/1934    CC: FU weakness    Interval History:    Pt with anxiety.  No Recent Labs   Lab  08/20/18   0543  08/21/18   0522  08/22/18   0535   GLU  86  122*  108*   BUN  25*  19  17   CREATSERUM  1.99*  1.88*  1.81*   GFRAA  26*  28*  29*   GFRNAA  23*  24*  25*   CA  6.6*  6.7*  6.7*   NA  143  141  141   K  4.7  3.6  3.9

## 2018-08-23 NOTE — PROGRESS NOTES
[facility]  Nephrology Progress Note    Chris Santana Attending:  Mell Fermin MD       Assessment and Plan:    1) TANIA- due to volume depletion with modest PO intake + high output ostomy (which improved at SNF with IVF) exac by urinary retent grossly intact, moving all extremities  Skin: Warm and dry, no rashes       Labs:     Lab Results  Component Value Date   WBC 6.0 08/23/2018   HGB 7.4 08/23/2018   HCT 23.1 08/23/2018   .0 08/23/2018   CREATSERUM 1.83 08/23/2018   BUN 13 08/23/2018

## 2018-08-23 NOTE — PROGRESS NOTES
[facility]  Nephrology Progress Note    Kathleen Pastor Attending:  Shawn Salas MD       Assessment and Plan:    1) TANIA- due to volume depletion with modest PO intake + high output ostomy (which improved at SNF with IVF) exac by urinary retent Warm and dry, no rashes       Labs:     Lab Results  Component Value Date   WBC 7.5 08/22/2018   HGB 7.8 08/22/2018   HCT 23.8 08/22/2018   .0 08/22/2018   CREATSERUM 1.81 08/22/2018   BUN 17 08/22/2018    08/22/2018   K 3.9 08/22/2018   CL 10

## 2018-08-24 PROBLEM — K82.0: Status: ACTIVE | Noted: 2018-08-24

## 2018-08-24 LAB
ANION GAP SERPL CALC-SCNC: 7 MMOL/L (ref 0–18)
BUN BLD-MCNC: 10 MG/DL (ref 8–20)
BUN/CREAT SERPL: 5.8 (ref 10–20)
CALCIUM BLD-MCNC: 6.4 MG/DL (ref 8.3–10.3)
CHLORIDE SERPL-SCNC: 110 MMOL/L (ref 101–111)
CO2 SERPL-SCNC: 24 MMOL/L (ref 22–32)
CREAT BLD-MCNC: 1.73 MG/DL (ref 0.55–1.02)
GLUCOSE BLD-MCNC: 107 MG/DL (ref 70–99)
HAV IGM SER QL: 1.8 MG/DL (ref 1.8–2.5)
OSMOLALITY SERPL CALC.SUM OF ELEC: 292 MOSM/KG (ref 275–295)
PHOSPHATE SERPL-MCNC: 2.3 MG/DL (ref 2.5–4.9)
POTASSIUM SERPL-SCNC: 4.6 MMOL/L (ref 3.6–5.1)
SODIUM SERPL-SCNC: 141 MMOL/L (ref 136–144)

## 2018-08-24 PROCEDURE — 99232 SBSQ HOSP IP/OBS MODERATE 35: CPT | Performed by: INTERNAL MEDICINE

## 2018-08-24 RX ORDER — DOXEPIN HYDROCHLORIDE 50 MG/1
1 CAPSULE ORAL DAILY
Status: DISCONTINUED | OUTPATIENT
Start: 2018-08-24 | End: 2018-09-01

## 2018-08-24 RX ORDER — NALOXONE HYDROCHLORIDE 0.4 MG/ML
80 INJECTION, SOLUTION INTRAMUSCULAR; INTRAVENOUS; SUBCUTANEOUS AS NEEDED
Status: ACTIVE | OUTPATIENT
Start: 2018-08-24 | End: 2018-08-24

## 2018-08-24 RX ORDER — HEPARIN SODIUM 5000 [USP'U]/ML
5000 INJECTION, SOLUTION INTRAVENOUS; SUBCUTANEOUS EVERY 8 HOURS SCHEDULED
Status: DISCONTINUED | OUTPATIENT
Start: 2018-08-24 | End: 2018-09-01

## 2018-08-24 RX ORDER — DIPHENHYDRAMINE HYDROCHLORIDE, ZINC ACETATE 2; .1 G/100G; G/100G
1 CREAM TOPICAL 3 TIMES DAILY PRN
Status: DISCONTINUED | OUTPATIENT
Start: 2018-08-24 | End: 2018-09-01

## 2018-08-24 RX ORDER — SODIUM CHLORIDE, SODIUM LACTATE, POTASSIUM CHLORIDE, CALCIUM CHLORIDE 600; 310; 30; 20 MG/100ML; MG/100ML; MG/100ML; MG/100ML
INJECTION, SOLUTION INTRAVENOUS CONTINUOUS
Status: DISCONTINUED | OUTPATIENT
Start: 2018-08-24 | End: 2018-08-24

## 2018-08-24 RX ORDER — VITS A,C,E/LUTEIN/MINERALS 300MCG-200
1 TABLET ORAL 2 TIMES DAILY
Status: DISCONTINUED | OUTPATIENT
Start: 2018-08-24 | End: 2018-09-01

## 2018-08-24 NOTE — PHYSICAL THERAPY NOTE
PHYSICAL THERAPY TREATMENT NOTE - INPATIENT    Room Number: 321/321-A     Session: 1   Number of Visits to Meet Established Goals: 5    Presenting Problem: Urinary retention, hydronephrosis, and intractable N/V       History related to current admission: Rectal cancer (HCC)    Hyponatremia    Anemia    Intractable vomiting with nausea    Urinary retention    Hydronephrosis, unspecified hydronephrosis type    Nausea and vomiting    Cancer associated pain    Anemia, chronic disease    CKD (chronic kidney di resection x 2  2003: OTHER SURGICAL HISTORY      Comment: vulvectomy per Dr. Craven Mediate  No date: OTHER SURGICAL HISTORY      Comment: perirectal abcesses  10-07: OTHER SURGICAL HISTORY      Comment: hip fracture    SUBJECTIVE  \"I'll be ok.  I was doing fine u deferred standing/gait d/t pn and LH    Skilled Therapy Provided: RN approved session. Pt presents in sidelying in bed. Supervision sup>sit c increased time and HOB raised. Pt able to scoot to EOB c supervision. Pt refusing to t/f to chair.  Pt encourag a FANNY. Following this stay at [place] [address], pt will benefit from transition to LTC or potentially [facility]. DISCHARGE RECOMMENDATIONS  PT Discharge Recommendations: Sub-acute rehabilitation; Other (Comment) (leslee DOWD 12-14 days)     PLAN  PT Treatment Plan: Bed mobility

## 2018-08-24 NOTE — PLAN OF CARE
Pt a&o, irritable, refused evening medications. C/o discomfort, 2/10 pain, refused pain meds. Small amount of serosanguineous drainage from rectum when pt came back to floor. Ileostomy intact, small amount of brown liquid output, +gas.  Pt able to sleep for

## 2018-08-24 NOTE — CONSULTS
[facility] Surgical Oncology    Patient Name:  Mohamud Whitley   YOB: 1934   Gender:  Female   Appt Date:  8/23/2018   Provider:  Rod Rodriguez MD   Insurance:  MEDICARE PART A&B         CHIEF COMPLAINT  Locally advanced rectal cancer     NC Patient is an 54-year-old woman who is currently being consulted for severe dilatation of the rectum sigmoid and rectosigmoid stump. She initially met with me in 2017 for surgical management of rectal carcinoma.   That she would have required a pelvic exen Diagnosis Date   • ALLERGIC RHINITIS    • ANXIETY    • Anxiety state    • Autoimmune disease ([facility] [address]    • Blood disorder    • Cancer ([facility]     vulva 14 years ago, tx with surgery alone.    • Crohn disease ([facility]    • Crohn's disease ([facility]    • Esophageal reflu Smokeless tobacco: Never Used                      Alcohol use: Yes           0.0 oz/week     Comment: 1 glass of wine a month     Reviewed:  Family History   Problem Relation Age of Onset   • Cancer Sister      breast cancer   • Stroke Sister    • Heart A Skin: Inspection and palpation: no jaundice. Document Review:  Images were reviewed and summarized as above. I reviewed the images, discussed them with the patient. Official reports. Assessment / Plan:  Ventura's pouch obstruction.     Findings

## 2018-08-24 NOTE — OCCUPATIONAL THERAPY NOTE
OCCUPATIONAL THERAPY TREATMENT NOTE - INPATIENT     Room Number: 321/321-A  Session: 1   Number of Visits to Meet Established Goals: 3    Presenting Problem: n/v, rectal tumor    History related to current admission: Pt was admitted from [facility] 8/18/2018 Anemia    Intractable vomiting with nausea    Urinary retention    Hydronephrosis, unspecified hydronephrosis type    Nausea and vomiting    Cancer associated pain    Anemia, chronic disease    CKD (chronic kidney disease) stage 3, GFR 30-59 ml/min ([facility]) HISTORY      Comment: vulvectomy per Dr. Virl Dubin  No date: OTHER SURGICAL HISTORY      Comment: perirectal abcesses  10-07: OTHER SURGICAL HISTORY      Comment: hip fracture    SUBJECTIVE  Pt stated, \"I will get back to being normal.\"    Patient self-stat admitted 8/18/2018 for n/v. In OT session 1 of 5 patient is progressing with the following impairments: endurance, safety, pain, and activity tolerance. These deficits continue manifest functionally while performing mobility and self-care.    The patient

## 2018-08-24 NOTE — PROGRESS NOTES
[facility]  Nephrology Progress Note    Mariana Walker Patient Status:  Inpatient    1934 MRN [id number]   [facility] 3NW-A Attending Diana Mendez MD   Hosp Day # 6 PCP Jaja Lam MD       SUBJECTIVE:  Up working with t 137   --   141   K  4.7  3.6  3.9   --   4.1  4.6   CL  116*  111  107  107   --   110   CO2  19.0*  22.0  28.0  25.0   --   24.0   BUN  25*  19  17  13   --   10   CREATSERUM  1.99*  1.88*  1.81*  1.83*   --   1.73*   CA  6.6*  6.7*  6.7*  6.1*   --   6.4 Impression/Plan:    #1. TANIA- multifactorial with prerenal azotemia in setting of poor intake/high ostomy output as well as urinary obstruction. Renal fxn cont to improve steadily with IVF and naqvi. D/C IVF and cont to follow    #2.   Metabolic a

## 2018-08-24 NOTE — PROGRESS NOTES
[facility] Hospitalist Progress Note                                                                   [address]  7/28/1934    CC: FU weakness    Interval History:    Pt wants various vit 22.7*  23.8*  23.1*   MCV  70.7*  70.6*  72.0*   MCH  23.1*  23.1*  23.1*   MCHC  32.6  32.8  32.0   RDW  21.7*  21.7*  21.4*   NEPRELIM  5.48  6.19  4.54   WBC  6.6  7.5  6.0   PLT  180.0  197.0  188.0     Recent Labs   Lab  08/22/18   0535  08/23/18   05 now    #Anxiety  -xanax increased    Not doing well at home.  PT/OT has seen, recommend AI Sexton MD  [facility] Hospitalist  Pager: [phone]

## 2018-08-24 NOTE — OR NURSING
Patient stated pain was a \"6-7\" to her rectal area. Offered patient what was on her MAR. She was stating she wanted what was \"used in the old days\" for pain.  She refused the morphine stated that it gave her a strange feeling up the back of her neck to

## 2018-08-24 NOTE — PROGRESS NOTES
Heme/Onc Progress Note    Patient Name: Bossman Vasquez   YOB: 1934   Medical Record Number: [id number]   Attending Physician: Maricarmen Rizvi. Thu Renee M.D. Subjective:  Patient denies any pain. States breathing is comfortable.  Complains of MG per tab 2 tablet, 2 tablet, Oral, Q4H PRN  •  ondansetron HCl (ZOFRAN) injection 4 mg, 4 mg, Intravenous, Q4H PRN  •  LORazepam (ATIVAN) injection 0.5 mg, 0.5 mg, Intravenous, Once  •  ondansetron HCl (ZOFRAN) injection 4 mg, 4 mg, Intravenous, Q6H PRN (L) 3.80 - 5.10 x10(6)uL   HGB 7.9 (L) 12.0 - 16.0 g/dL   HCT 24.6 (L) 34.0 - 50.0 %   .0 150.0 - 450.0 10(3)uL   MCV 72.6 (L) 81.0 - 100.0 fL   MCH 23.3 (L) 27.0 - 33.2 pg   MCHC 32.1 31.0 - 37.0 g/dL   RDW 21.4 (H) 11.5 - 16.0 %   RDW-SD 53.2 (H)

## 2018-08-24 NOTE — PLAN OF CARE
Problem: PAIN - ADULT  Goal: Verbalizes/displays adequate comfort level or patient's stated pain goal  INTERVENTIONS:  - Encourage pt to monitor pain and request assistance  - Assess pain using appropriate pain scale  - Administer analgesics based on type as appropriate  - Assess patient's ability to be responsible for managing their own health  - Refer to Case Management Department for coordinating discharge planning if the patient needs post-hospital services based on physician/LIP order or complex needs nutritional consult as needed  - Establish a toileting routine/schedule  - Consider collaborating with pharmacy to review patient's medication profile   Outcome: Progressing  ILEOSTOMY DRAINING BROWNISH  BLACK COLOR LIQUID STOOL     Problem: GENITOURINARY REFUSED TO GET UP .  PT/ OT SAW THE PATIENT     Problem: Impaired Activities of Daily Living  Goal: Achieve highest/safest level of independence in self care  Interventions:  - Assess ability and encourage patient to participate in ADLs to maximize function

## 2018-08-24 NOTE — IMAGING NOTE
Pre procedure instruction given to [employer]. Procedure to be done on Monday per KEVIN Edwards from Sunday [place]. Hold blood thinner Monday AM. PT INR on Monday morning.  Tentative time for the procedure is at 1pm

## 2018-08-25 LAB
ALBUMIN SERPL-MCNC: 2.5 G/DL (ref 3.5–4.8)
ALBUMIN/GLOB SERPL: 0.8 {RATIO} (ref 1–2)
ALP LIVER SERPL-CCNC: 106 U/L (ref 55–142)
ALT SERPL-CCNC: 12 U/L (ref 14–54)
ANION GAP SERPL CALC-SCNC: 6 MMOL/L (ref 0–18)
AST SERPL-CCNC: 13 U/L (ref 15–41)
BASOPHILS # BLD AUTO: 0.04 X10(3) UL (ref 0–0.1)
BASOPHILS NFR BLD AUTO: 0.6 %
BILIRUB SERPL-MCNC: 0.6 MG/DL (ref 0.1–2)
BUN BLD-MCNC: 13 MG/DL (ref 8–20)
BUN/CREAT SERPL: 7.3 (ref 10–20)
CALCIUM BLD-MCNC: 6.8 MG/DL (ref 8.3–10.3)
CHLORIDE SERPL-SCNC: 111 MMOL/L (ref 101–111)
CO2 SERPL-SCNC: 22 MMOL/L (ref 22–32)
CREAT BLD-MCNC: 1.77 MG/DL (ref 0.55–1.02)
EOSINOPHIL # BLD AUTO: 0.28 X10(3) UL (ref 0–0.3)
EOSINOPHIL NFR BLD AUTO: 4.1 %
ERYTHROCYTE [DISTWIDTH] IN BLOOD BY AUTOMATED COUNT: 21.4 % (ref 11.5–16)
GLOBULIN PLAS-MCNC: 3.1 G/DL (ref 2.5–4)
GLUCOSE BLD-MCNC: 94 MG/DL (ref 70–99)
HCT VFR BLD AUTO: 24.6 % (ref 34–50)
HGB BLD-MCNC: 7.9 G/DL (ref 12–16)
IMMATURE GRANULOCYTE COUNT: 0.05 X10(3) UL (ref 0–1)
IMMATURE GRANULOCYTE RATIO %: 0.7 %
LYMPHOCYTES # BLD AUTO: 0.47 X10(3) UL (ref 0.9–4)
LYMPHOCYTES NFR BLD AUTO: 6.8 %
M PROTEIN MFR SERPL ELPH: 5.6 G/DL (ref 6.1–8.3)
MCH RBC QN AUTO: 23.3 PG (ref 27–33.2)
MCHC RBC AUTO-ENTMCNC: 32.1 G/DL (ref 31–37)
MCV RBC AUTO: 72.6 FL (ref 81–100)
MONOCYTES # BLD AUTO: 0.78 X10(3) UL (ref 0.1–1)
MONOCYTES NFR BLD AUTO: 11.4 %
NEUTROPHIL ABS PRELIM: 5.25 X10 (3) UL (ref 1.3–6.7)
NEUTROPHILS # BLD AUTO: 5.25 X10(3) UL (ref 1.3–6.7)
NEUTROPHILS NFR BLD AUTO: 76.4 %
OSMOLALITY SERPL CALC.SUM OF ELEC: 288 MOSM/KG (ref 275–295)
PLATELET # BLD AUTO: 208 10(3)UL (ref 150–450)
POTASSIUM SERPL-SCNC: 4.8 MMOL/L (ref 3.6–5.1)
RBC # BLD AUTO: 3.39 X10(6)UL (ref 3.8–5.1)
RED CELL DISTRIBUTION WIDTH-SD: 53.2 FL (ref 35.1–46.3)
SODIUM SERPL-SCNC: 139 MMOL/L (ref 136–144)
WBC # BLD AUTO: 6.9 X10(3) UL (ref 4–13)

## 2018-08-25 PROCEDURE — 99232 SBSQ HOSP IP/OBS MODERATE 35: CPT | Performed by: INTERNAL MEDICINE

## 2018-08-25 PROCEDURE — 99232 SBSQ HOSP IP/OBS MODERATE 35: CPT | Performed by: SPECIALIST

## 2018-08-25 NOTE — PROGRESS NOTES
Surgical Oncology Inpatient Progress Note    S: No major events overnight, has been stable and afebrile.  Subjectively more feels bloated this AM.     O:  /54 (BP Location: Right arm)   Pulse 87   Temp 98.1 °F (36.7 °C) (Oral)   Resp 18   Ht 1.626 m (

## 2018-08-25 NOTE — PROGRESS NOTES
[facility]  Nephrology Progress Note    Ron Ricketts Patient Status:  Inpatient    1934 MRN [id number]   [facility] 3NW-A Attending Sarath Cordon MD   Hosp Day # 7 PCP Fiorella Coels MD       SUBJECTIVE:  C/o abd discomfor 08/23/18   0511  08/23/18   0621  08/24/18   0526  08/25/18   0535   NA  143  141  141  137   --   141  139   K  4.7  3.6  3.9   --   4.1  4.6  4.8   CL  116*  111  107  107   --   110  111   CO2  19.0*  22.0  28.0  25.0   --   24.0  22.0   BUN  25*  19  1 ondansetron HCl (ZOFRAN) injection 4 mg 4 mg Intravenous Q4H PRN   LORazepam (ATIVAN) injection 0.5 mg 0.5 mg Intravenous Once   ondansetron HCl (ZOFRAN) injection 4 mg 4 mg Intravenous Q6H PRN     Facility-Administered Medications Ordered in Other Encou

## 2018-08-26 LAB
ANION GAP SERPL CALC-SCNC: 6 MMOL/L (ref 0–18)
BUN BLD-MCNC: 13 MG/DL (ref 8–20)
BUN/CREAT SERPL: 6.6 (ref 10–20)
CALCIUM BLD-MCNC: 7 MG/DL (ref 8.3–10.3)
CHLORIDE SERPL-SCNC: 111 MMOL/L (ref 101–111)
CO2 SERPL-SCNC: 23 MMOL/L (ref 22–32)
CREAT BLD-MCNC: 1.98 MG/DL (ref 0.55–1.02)
GLUCOSE BLD-MCNC: 91 MG/DL (ref 70–99)
OSMOLALITY SERPL CALC.SUM OF ELEC: 290 MOSM/KG (ref 275–295)
POTASSIUM SERPL-SCNC: 5.1 MMOL/L (ref 3.6–5.1)
SODIUM SERPL-SCNC: 140 MMOL/L (ref 136–144)

## 2018-08-26 PROCEDURE — 99232 SBSQ HOSP IP/OBS MODERATE 35: CPT | Performed by: INTERNAL MEDICINE

## 2018-08-26 PROCEDURE — 99232 SBSQ HOSP IP/OBS MODERATE 35: CPT | Performed by: SPECIALIST

## 2018-08-26 RX ORDER — SODIUM CHLORIDE 9 MG/ML
INJECTION, SOLUTION INTRAVENOUS CONTINUOUS
Status: DISCONTINUED | OUTPATIENT
Start: 2018-08-26 | End: 2018-08-27

## 2018-08-26 NOTE — PROGRESS NOTES
Heme/Onc Progress Note    Patient Name: Dolores Calvillo   YOB: 1934   Medical Record Number: [id number]   Attending Physician: Caesar Haro. Jose Armando Jarvis M.D. Subjective:  Patient denies any pain. States breathing is comfortable.  Continues to PRN  •  LORazepam (ATIVAN) injection 0.5 mg, 0.5 mg, Intravenous, Once  •  ondansetron HCl (ZOFRAN) injection 4 mg, 4 mg, Intravenous, Q6H PRN    Facility-Administered Medications Ordered in Other Encounters:   •  Darbepoetin Orlando (ARANESP) 300 MCG/0.6ML i [facility]  Director, Bone and Soft Tissue Sarcoma Program  Section of Hematology/Oncology, [address] [facility]

## 2018-08-26 NOTE — PROGRESS NOTES
[facility]  Nephrology Progress Note    Chava Ruvalcaba Patient Status:  Inpatient    1934 MRN [id number]   [facility] 3NW-A Attending Agata De La Rosa MD   Hosp Day # 8 PCP Robert Zayas MD       SUBJECTIVE:  Notes ongoing abd 08/22/18   0535  08/23/18   0511  08/23/18   0621  08/24/18   0526  08/25/18   0535  08/26/18   0538   NA  143  141  141  137   --   141  139  140   K  4.7  3.6  3.9   --   4.1  4.6  4.8  5.1   CL  116*  111  107  107   --   110  111  111   CO2  19.0*  22. (NORCO) 5-325 MG per tab 2 tablet 2 tablet Oral Q4H PRN   ondansetron HCl (ZOFRAN) injection 4 mg 4 mg Intravenous Q4H PRN   LORazepam (ATIVAN) injection 0.5 mg 0.5 mg Intravenous Once   ondansetron HCl (ZOFRAN) injection 4 mg 4 mg Intravenous Q6H PRN

## 2018-08-26 NOTE — PLAN OF CARE
GASTROINTESTINAL - ADULT    • Minimal or absence of nausea and vomiting Progressing    • Maintains or returns to baseline bowel function Progressing        GENITOURINARY - ADULT    • Absence of urinary retention Progressing        Impaired Activities of Da

## 2018-08-26 NOTE — PROGRESS NOTES
Surgical Oncology Inpatient Progress Note    S: No major changes in overall status, reports feeling \"about the same\" as compared to yesterday.     O:  /70 (BP Location: Right arm)   Pulse 106   Temp 98.1 °F (36.7 °C) (Oral)   Resp 18   Ht 1.626 m (5

## 2018-08-26 NOTE — PROGRESS NOTES
8/26/18 Right abdominal ostomy noted to be leaking with soft greenish/brown stool noted. Pt requesting Artie bag #[id number] to be applied. Per central distribution, only available bags are Artie #[id number].  Barrier ring applied and [place] bag #[id number] applie

## 2018-08-26 NOTE — PROGRESS NOTES
[facility] Hospitalist Progress Note                                                                   [address]  7/28/1934    CC: FU weakness    Interval History:  Tired.  Feeling bloated 24.6*   MCV  70.6*  72.0*  72.6*   MCH  23.1*  23.1*  23.3*   MCHC  32.8  32.0  32.1   RDW  21.7*  21.4*  21.4*   NEPRELIM  6.19  4.54  5.25   WBC  7.5  6.0  6.9   PLT  197.0  188.0  208.0     Recent Labs   Lab  08/23/18   0511  08/23/18   0621  08/24/18 increased    Not doing well at home.  PT/OT has seen, recommend AI    Dispo: tentative plan for OR Monday    Radha Hurtado MD  [facility] IM Hospitalist  Pager: [phone]

## 2018-08-26 NOTE — PROGRESS NOTES
[facility] Hospitalist Progress Note                                                                   [address]  7/28/1934    CC: FU weakness    Interval History:  Tired. Laying in bed. 7.9*   HCT  23.8*  23.1*  24.6*   MCV  70.6*  72.0*  72.6*   MCH  23.1*  23.1*  23.3*   MCHC  32.8  32.0  32.1   RDW  21.7*  21.4*  21.4*   NEPRELIM  6.19  4.54  5.25   WBC  7.5  6.0  6.9   PLT  197.0  188.0  208.0     Recent Labs   Lab  08/24/18   0526  0 AI    Dispo: angel Suárez MD  [facility] IM Hospitalist  Pager: [phone]

## 2018-08-27 ENCOUNTER — APPOINTMENT (OUTPATIENT)
Dept: CT IMAGING | Facility: HOSPITAL | Age: 83
DRG: 375 | End: 2018-08-27
Attending: PHYSICIAN ASSISTANT
Payer: MEDICARE

## 2018-08-27 LAB
ALBUMIN SERPL-MCNC: 2.5 G/DL (ref 3.5–4.8)
ALBUMIN/GLOB SERPL: 0.8 {RATIO} (ref 1–2)
ALP LIVER SERPL-CCNC: 99 U/L (ref 55–142)
ALT SERPL-CCNC: 10 U/L (ref 14–54)
ANION GAP SERPL CALC-SCNC: 8 MMOL/L (ref 0–18)
AST SERPL-CCNC: 10 U/L (ref 15–41)
BASOPHIL PERITONEAL FLUID: 0 %
BASOPHILS # BLD AUTO: 0.03 X10(3) UL (ref 0–0.1)
BASOPHILS NFR BLD AUTO: 0.5 %
BILIRUB SERPL-MCNC: 0.6 MG/DL (ref 0.1–2)
BUN BLD-MCNC: 13 MG/DL (ref 8–20)
BUN/CREAT SERPL: 6.8 (ref 10–20)
CALCIUM BLD-MCNC: 6.7 MG/DL (ref 8.3–10.3)
CHLORIDE SERPL-SCNC: 115 MMOL/L (ref 101–111)
CO2 SERPL-SCNC: 19 MMOL/L (ref 22–32)
CREAT BLD-MCNC: 1.9 MG/DL (ref 0.55–1.02)
EOSINOPHIL # BLD AUTO: 0.24 X10(3) UL (ref 0–0.3)
EOSINOPHIL NFR BLD AUTO: 3.6 %
EOSINOPHILS PERITONEAL FLUID: 0 %
ERYTHROCYTE [DISTWIDTH] IN BLOOD BY AUTOMATED COUNT: 21.3 % (ref 11.5–16)
GLOBULIN PLAS-MCNC: 3.1 G/DL (ref 2.5–4)
GLUCOSE BLD-MCNC: 94 MG/DL (ref 70–99)
HCT VFR BLD AUTO: 24.1 % (ref 34–50)
HGB BLD-MCNC: 7.6 G/DL (ref 12–16)
IMMATURE GRANULOCYTE COUNT: 0.06 X10(3) UL (ref 0–1)
IMMATURE GRANULOCYTE RATIO %: 0.9 %
INR BLD: 0.96 (ref 0.9–1.1)
LYMPHOCYTE PERITONEAL FLUID: 0 %
LYMPHOCYTES # BLD AUTO: 0.46 X10(3) UL (ref 0.9–4)
LYMPHOCYTES NFR BLD AUTO: 6.9 %
M PROTEIN MFR SERPL ELPH: 5.6 G/DL (ref 6.1–8.3)
MCH RBC QN AUTO: 23.1 PG (ref 27–33.2)
MCHC RBC AUTO-ENTMCNC: 31.5 G/DL (ref 31–37)
MCV RBC AUTO: 73.3 FL (ref 81–100)
MONO/MAC/HISTIO PERITONEAL: 4 %
MONOCYTES # BLD AUTO: 0.64 X10(3) UL (ref 0.1–1)
MONOCYTES NFR BLD AUTO: 9.7 %
NEUTROPHIL ABS PRELIM: 5.2 X10 (3) UL (ref 1.3–6.7)
NEUTROPHILS # BLD AUTO: 5.2 X10(3) UL (ref 1.3–6.7)
NEUTROPHILS NFR BLD AUTO: 78.4 %
NEUTROPHILS PERITONEAL FLUID: 96 %
OSMOLALITY SERPL CALC.SUM OF ELEC: 294 MOSM/KG (ref 275–295)
PLATELET # BLD AUTO: 206 10(3)UL (ref 150–450)
POTASSIUM SERPL-SCNC: 5 MMOL/L (ref 3.6–5.1)
PSA SERPL DL<=0.01 NG/ML-MCNC: 13.2 SECONDS (ref 12.4–14.7)
RBC # BLD AUTO: 3.29 X10(6)UL (ref 3.8–5.1)
RBC PERITONEAL FLUID: <3000 /MM3
RED CELL DISTRIBUTION WIDTH-SD: 53.1 FL (ref 35.1–46.3)
SODIUM SERPL-SCNC: 142 MMOL/L (ref 136–144)
TOTAL CELLS COUNTED: 100
WBC # BLD AUTO: 6.6 X10(3) UL (ref 4–13)
WBC PERITONEAL FLUID: 3520 /MM3

## 2018-08-27 PROCEDURE — 49406 IMAGE CATH FLUID PERI/RETRO: CPT | Performed by: PHYSICIAN ASSISTANT

## 2018-08-27 PROCEDURE — 0W9J30Z DRAINAGE OF PELVIC CAVITY WITH DRAINAGE DEVICE, PERCUTANEOUS APPROACH: ICD-10-PCS | Performed by: RADIOLOGY

## 2018-08-27 PROCEDURE — 99232 SBSQ HOSP IP/OBS MODERATE 35: CPT | Performed by: INTERNAL MEDICINE

## 2018-08-27 PROCEDURE — 99152 MOD SED SAME PHYS/QHP 5/>YRS: CPT | Performed by: PHYSICIAN ASSISTANT

## 2018-08-27 RX ORDER — MIDAZOLAM HYDROCHLORIDE 1 MG/ML
1 INJECTION INTRAMUSCULAR; INTRAVENOUS EVERY 5 MIN PRN
Status: DISCONTINUED | OUTPATIENT
Start: 2018-08-27 | End: 2018-08-27 | Stop reason: HOSPADM

## 2018-08-27 RX ORDER — FLUMAZENIL 0.1 MG/ML
0.2 INJECTION, SOLUTION INTRAVENOUS AS NEEDED
Status: DISCONTINUED | OUTPATIENT
Start: 2018-08-27 | End: 2018-08-27 | Stop reason: HOSPADM

## 2018-08-27 RX ORDER — SODIUM CHLORIDE 9 MG/ML
INJECTION, SOLUTION INTRAVENOUS CONTINUOUS
Status: DISCONTINUED | OUTPATIENT
Start: 2018-08-27 | End: 2018-08-27 | Stop reason: HOSPADM

## 2018-08-27 RX ORDER — SODIUM CHLORIDE, SODIUM LACTATE, POTASSIUM CHLORIDE, CALCIUM CHLORIDE 600; 310; 30; 20 MG/100ML; MG/100ML; MG/100ML; MG/100ML
INJECTION, SOLUTION INTRAVENOUS CONTINUOUS
Status: DISCONTINUED | OUTPATIENT
Start: 2018-08-27 | End: 2018-09-01

## 2018-08-27 RX ORDER — MIDAZOLAM HYDROCHLORIDE 1 MG/ML
INJECTION INTRAMUSCULAR; INTRAVENOUS
Status: COMPLETED
Start: 2018-08-27 | End: 2018-08-27

## 2018-08-27 RX ORDER — NALOXONE HYDROCHLORIDE 0.4 MG/ML
80 INJECTION, SOLUTION INTRAMUSCULAR; INTRAVENOUS; SUBCUTANEOUS AS NEEDED
Status: DISCONTINUED | OUTPATIENT
Start: 2018-08-27 | End: 2018-08-27 | Stop reason: HOSPADM

## 2018-08-27 NOTE — PROGRESS NOTES
Heme/Onc Progress Note    Patient Name: Real Frost   YOB: 1934   Medical Record Number: [id number]   CSN: [id number]   Attending Physician: Len Karimi M.D. Subjective:  Anxious and restless. Got xanax with some relief. HYDROcodone-acetaminophen (NORCO) 5-325 MG per tab 2 tablet, 2 tablet, Oral, Q4H PRN  •  ondansetron HCl (ZOFRAN) injection 4 mg, 4 mg, Intravenous, Q4H PRN  •  LORazepam (ATIVAN) injection 0.5 mg, 0.5 mg, Intravenous, Once  •  ondansetron HCl (ZOFRAN) inj 15 - 41 U/L   Alt 10 (L) 14 - 54 U/L   Alkaline Phosphatase 99 55 - 142 U/L   Bilirubin, Total 0.6 0.1 - 2.0 mg/dL   Total Protein 5.6 (L) 6.1 - 8.3 g/dL   Albumin 2.5 (L) 3.5 - 4.8 g/dL   Globulin  3.1 2.5 - 4.0 g/dL   A/G Ratio 0.8 (L) 1.0 - 2.0   -CBC W

## 2018-08-27 NOTE — PROCEDURES
[facility]  Procedure Note    Carrie Sprain Patient Status:  Inpatient    1934 MRN [id number]   [facility] 3NW-A Attending Alexa Nam MD   Hosp Day # 9 PCP Tracey Mcmahan MD     Procedure: CT guided drain placement    Pre-

## 2018-08-27 NOTE — IMAGING NOTE
Patient tolerated CT guided left transgluteal drain placement procedure well, VSS on RA, presently denies pain, Sorbaview dressing to drain area is CDI.  Patient updated on procedure and plan of care, report called to [facility] [place] RN and transport here to take p

## 2018-08-27 NOTE — CM/SW NOTE
Discussed pt during daily rounds. Pt to have drain placed today. PT still recommending subacute rehab. Updates sent to the [facility] via [address]. CM/SW to follow.      Anuj Chapman RN, [facility] [address]

## 2018-08-27 NOTE — PROGRESS NOTES
[facility] Hospitalist note    PCP: Fiorella Coles MD    Chief Complaint:  79 yo woman with advanced rectal cancer here with weakness, poor oral intake and UOP    SUBJECTIVE:  Pt seen when coming back from IR drain placement  Reports  No sob, feels groggy after 3.9   --   4.1  4.6  4.8  5.1  5.0   CL  111  107  107   --   110  111  111  115*   CO2  22.0  28.0  25.0   --   24.0  22.0  23.0  19.0*   BUN  19  17  13   --   10  13  13  13   CREATSERUM  1.88*  1.81*  1.83*   --   1.73*  1.77*  1.98*  1.90*   CA  6.7* epo    4) b/l sacral/pubic fractures- noted on MRI, conservative mgmt per ortho    5) urine retention- mass effect from rectal stenosis displacing bladder. Nunes as above. 6) narrow complex tachycardia- on tele. Echo okay. Cards saw.     AI recommended

## 2018-08-27 NOTE — H&P
[id number] [employer] Patient Status:  Inpatient    1934 MRN [id number]   [facility] 3NW-A Attending Matti Montes MD   Hosp Day # 9 PCP Ulysses Conner, MD     Admitting Diagnosis:   Pelvic fluid col ILEOSTOMY/JEJUNOSTOMY,NONTUBE  No date: OTHER SURGICAL HISTORY      Comment: colon resection x 2  2003: OTHER SURGICAL HISTORY      Comment: vulvectomy per Dr. Coleen Zavala  No date: OTHER SURGICAL HISTORY      Comment: perirectal abcesses  10-07: OTHER SURGICAL 7. 9*  7.6*   HCT  23.1*  24.6*  24.1*   MCV  72.0*  72.6*  73.3*   MCH  23.1*  23.3*  23.1*   MCHC  32.0  32.1  31.5   RDW  21.4*  21.4*  21.3*   NEPRELIM  4.54  5.25  5.20   WBC  6.0  6.9  6.6   PLT  188.0  208.0  206.0     Recent Labs   Lab  08/27/18   0

## 2018-08-27 NOTE — PROGRESS NOTES
[facility]  Nephrology Progress Note    Ludmila Heredia Attending:  Ed Delcid MD       Assessment and Plan:    1) TANIA- due to volume depletion with modest PO intake + high output ostomy (which improved at SNF with IVF) exac by urinary retent no rashes       Labs:     Lab Results  Component Value Date   WBC 6.6 08/27/2018   HGB 7.6 08/27/2018   HCT 24.1 08/27/2018   .0 08/27/2018   CREATSERUM 1.90 08/27/2018   BUN 13 08/27/2018    08/27/2018   K 5.0 08/27/2018    08/27/2018 mg Intravenous Once   ondansetron HCl (ZOFRAN) injection 4 mg 4 mg Intravenous Q6H PRN     Facility-Administered Medications Ordered in Other Encounters:  Darbepoetin Orlando (ARANESP) 300 MCG/0.6ML injection 300 mcg 300 mcg Subcutaneous Q30 Days         Ques

## 2018-08-27 NOTE — PLAN OF CARE
Assumed care for this patient at 0730: patient alert and oriented. Very anxious and restless at times. Xanax prn. Denies pain at this time. Vitals stable. Patient with obstruction of Ventura's pouch plan for drainage by IR today.  Patient updated with plan

## 2018-08-28 ENCOUNTER — APPOINTMENT (OUTPATIENT)
Dept: ULTRASOUND IMAGING | Facility: HOSPITAL | Age: 83
DRG: 375 | End: 2018-08-28
Attending: INTERNAL MEDICINE
Payer: MEDICARE

## 2018-08-28 LAB
ALBUMIN SERPL-MCNC: 2.3 G/DL (ref 3.5–4.8)
ALBUMIN/GLOB SERPL: 0.8 {RATIO} (ref 1–2)
ALP LIVER SERPL-CCNC: 97 U/L (ref 55–142)
ALT SERPL-CCNC: 10 U/L (ref 14–54)
ANION GAP SERPL CALC-SCNC: 11 MMOL/L (ref 0–18)
AST SERPL-CCNC: 10 U/L (ref 15–41)
BASOPHILS # BLD AUTO: 0.03 X10(3) UL (ref 0–0.1)
BASOPHILS NFR BLD AUTO: 0.5 %
BILIRUB SERPL-MCNC: 0.7 MG/DL (ref 0.1–2)
BUN BLD-MCNC: 15 MG/DL (ref 8–20)
BUN/CREAT SERPL: 8 (ref 10–20)
CALCIUM BLD-MCNC: 6.8 MG/DL (ref 8.3–10.3)
CHLORIDE SERPL-SCNC: 112 MMOL/L (ref 101–111)
CO2 SERPL-SCNC: 19 MMOL/L (ref 22–32)
CREAT BLD-MCNC: 1.88 MG/DL (ref 0.55–1.02)
EOSINOPHIL # BLD AUTO: 0.25 X10(3) UL (ref 0–0.3)
EOSINOPHIL NFR BLD AUTO: 4.2 %
ERYTHROCYTE [DISTWIDTH] IN BLOOD BY AUTOMATED COUNT: 21.4 % (ref 11.5–16)
GLOBULIN PLAS-MCNC: 3 G/DL (ref 2.5–4)
GLUCOSE BLD-MCNC: 88 MG/DL (ref 70–99)
HAV IGM SER QL: 1.1 MG/DL (ref 1.8–2.5)
HCT VFR BLD AUTO: 23.4 % (ref 34–50)
HGB BLD-MCNC: 7.5 G/DL (ref 12–16)
IMMATURE GRANULOCYTE COUNT: 0.06 X10(3) UL (ref 0–1)
IMMATURE GRANULOCYTE RATIO %: 1 %
LYMPHOCYTES # BLD AUTO: 0.56 X10(3) UL (ref 0.9–4)
LYMPHOCYTES NFR BLD AUTO: 9.4 %
M PROTEIN MFR SERPL ELPH: 5.3 G/DL (ref 6.1–8.3)
MCH RBC QN AUTO: 23.2 PG (ref 27–33.2)
MCHC RBC AUTO-ENTMCNC: 32.1 G/DL (ref 31–37)
MCV RBC AUTO: 72.4 FL (ref 81–100)
MONOCYTES # BLD AUTO: 0.78 X10(3) UL (ref 0.1–1)
MONOCYTES NFR BLD AUTO: 13.1 %
NEUTROPHIL ABS PRELIM: 4.28 X10 (3) UL (ref 1.3–6.7)
NEUTROPHILS # BLD AUTO: 4.28 X10(3) UL (ref 1.3–6.7)
NEUTROPHILS NFR BLD AUTO: 71.8 %
OSMOLALITY SERPL CALC.SUM OF ELEC: 294 MOSM/KG (ref 275–295)
PHOSPHATE SERPL-MCNC: 2.3 MG/DL (ref 2.5–4.9)
PLATELET # BLD AUTO: 215 10(3)UL (ref 150–450)
POTASSIUM SERPL-SCNC: 4.9 MMOL/L (ref 3.6–5.1)
RBC # BLD AUTO: 3.23 X10(6)UL (ref 3.8–5.1)
RED CELL DISTRIBUTION WIDTH-SD: 53.7 FL (ref 35.1–46.3)
SODIUM SERPL-SCNC: 142 MMOL/L (ref 136–144)
WBC # BLD AUTO: 6 X10(3) UL (ref 4–13)

## 2018-08-28 PROCEDURE — 99232 SBSQ HOSP IP/OBS MODERATE 35: CPT | Performed by: INTERNAL MEDICINE

## 2018-08-28 PROCEDURE — 76700 US EXAM ABDOM COMPLETE: CPT | Performed by: INTERNAL MEDICINE

## 2018-08-28 PROCEDURE — 99233 SBSQ HOSP IP/OBS HIGH 50: CPT | Performed by: INTERNAL MEDICINE

## 2018-08-28 RX ORDER — SODIUM BICARBONATE 325 MG/1
1300 TABLET ORAL
Status: DISCONTINUED | OUTPATIENT
Start: 2018-08-28 | End: 2018-09-01

## 2018-08-28 NOTE — PLAN OF CARE
GASTROINTESTINAL - ADULT    • Minimal or absence of nausea and vomiting Progressing    • Maintains or returns to baseline bowel function Progressing        GENITOURINARY - ADULT    • Absence of urinary retention Progressing        PAIN - ADULT    • Andreia Dejesus

## 2018-08-28 NOTE — PROGRESS NOTES
[facility]  Nephrology Progress Note    Jordana Huffman Attending:  Emily Leo MD       Assessment and Plan:    1) TANIA- due to volume depletion with modest PO intake + high output ostomy (which improved at SNF with IVF) exac by urinary retent Soft, non-tender. + bowel sounds, no palpable organomegaly  Extremities: Without clubbing, cyanosis; no edema  Neurologic: Cranial nerves grossly intact, moving all extremities  Skin: Warm and dry, no rashes       Labs:     Lab Results  Component Value Vipul HYDROcodone-acetaminophen (NORCO) 5-325 MG per tab 1 tablet 1 tablet Oral Q4H PRN   Or      HYDROcodone-acetaminophen (NORCO) 5-325 MG per tab 2 tablet 2 tablet Oral Q4H PRN   ondansetron HCl (ZOFRAN) injection 4 mg 4 mg Intravenous Q4H PRN   LORazepam (

## 2018-08-28 NOTE — PHYSICAL THERAPY NOTE
PHYSICAL THERAPY TREATMENT NOTE - INPATIENT    Room Number: 321/321-A     Session: 3   Number of Visits to Meet Established Goals: 5    Presenting Problem: Urinary retention, hydronephrosis, and intractable N/V       History related to current admission: vomiting with nausea, unspecified vomiting type  Active Problems:    Rectal cancer (HCC)    Hyponatremia    Anemia    Intractable vomiting with nausea    Urinary retention    Hydronephrosis, unspecified hydronephrosis type    Nausea and vomiting    Cancer COLONOSCOPY      Comment: Procedure: COLONOSCOPY;  Surgeon: Norm Lundberg MD;  Location: [address] [place] [address] ENDOSCOPY  No date: D & C  No date: ILEOSTOMY/JEJUNOSTOMY,NONTUBE  No date: OTHER SURGICAL HISTORY      Comment: colon resection x 2  2003: OTHER CK    FUNCTIONAL ABILITY STATUS  Gait Assessment   Gait Assistance: Dependent assistance (actual CGA )  Distance (ft): 60  Assistive Device: Rolling walker  Pattern: Shuffle (decreased laura,kyphotic)  Stoop/Curb Assistance: Not tested  Comment : pt defe rehabilitation; Other (Comment) (c ELOS 12-14 days)     PLAN  PT Treatment Plan: Bed mobility; Body mechanics; Endurance; Patient education;Gait training;Strengthening;Transfer training;Balance training;Stoop training  Rehab Potential : Guarded  Frequency (Obs

## 2018-08-28 NOTE — PROGRESS NOTES
Surgical Oncology Inpatient Progress Note    S: No major changes in overall status. Transgluteal drain place yesterday. Patient admits to discomfort at drain site, but admits to much improvement in overall rectal pain/pressure. Denies fever or chills.  Toel

## 2018-08-28 NOTE — PROGRESS NOTES
Heme/Onc Progress Note    Patient Name: Crystal Mora   YOB: 1934   Medical Record Number: [id number]   CSN: [id number]   Attending Physician: Jerod Herrera M.D. Subjective: Had drain placed yesterday without difficulty.   70 ml 10,000 Units, 10,000 Units, Intravenous, Once per day on Mon Wed Fri  •  sodium bicarbonate tab 1,300 mg, 1,300 mg, Oral, TID  •  melatonin tab 3 mg, 3 mg, Oral, Nightly PRN  •  TraZODone HCl (DESYREL) tab 25 mg, 25 mg, Oral, Nightly PRN  •  folic acid (FO ANAEROBIC   Collection Time: 08/27/18 12:52 PM   Result Value Ref Range   Body Fld Smear No organisms seen    Body Fld Smear 2+ WBCs seen    Body Fld Smear This is a cytocentrifuged smear.     -CELL COUNT PERITONEAL FLUID   Collection Time: 08/27/18 12:52 P 0. 78 0.10 - 1.00 x10(3) uL   Eosinophil Absolute 0.25 0.00 - 0.30 x10(3) uL   Basophil Absolute 0.03 0.00 - 0.10 x10(3) uL   Immature Granulocyte Absolute 0.06 0.00 - 1.00 x10(3) uL   Neutrophil % 71.8 %   Lymphocyte % 9.4 %   Monocyte % 13.1 %   Eosinophi catheter was flushed and left to suction bulb drainage. The catheter was   secured to the skin with nonabsorbable suture and a sterile dressing. The patient tolerated the procedure well. There were no immediate procedure-related complications. MD  [id number] [facility]  [address]

## 2018-08-28 NOTE — PROGRESS NOTES
[facility] Hospitalist note    PCP: Geetha Wolf MD    Chief Complaint:  81 yo woman with advanced rectal cancer here with weakness, poor oral intake and UOP    SUBJECTIVE:  Patient states she is very anxious this morning as she waits for an ultrasound and pe 88    < > = values in this interval not displayed. Recent Labs   Lab  08/25/18   0535  08/27/18   0519  08/28/18   0520   ALT  12*  10*  10*   AST  13*  10*  10*   ALB  2.5*  2.5*  2.3*       No results for input(s): PGLU in the last 168 hours.     No comfortable, lying in bed. States she does not have pain.  No sob    Exam  nad  Anicteric  rrr  cta anterolaterally  abd soft    Plan  Cont drainage with perc drain placed yesterday  U/s today  Voiding trial  Other issues stable  AI on d/c?

## 2018-08-28 NOTE — OCCUPATIONAL THERAPY NOTE
OCCUPATIONAL THERAPY TREATMENT NOTE - INPATIENT     Room Number: 321/321-A  Session: 2   Number of Visits to Meet Established Goals: 3    Presenting Problem: n/v, rectal tumor    History related to current admission: Pt was admitted from [facility] 8/18/2018 Anemia    Intractable vomiting with nausea    Urinary retention    Hydronephrosis, unspecified hydronephrosis type    Nausea and vomiting    Cancer associated pain    Anemia, chronic disease    CKD (chronic kidney disease) stage 3, GFR 30-59 ml/min ([facility]) ENDOSCOPY  No date: D & C  No date: ILEOSTOMY/JEJUNOSTOMY,NONTUBE  No date: OTHER SURGICAL HISTORY      Comment: colon resection x 2  2003: OTHER SURGICAL HISTORY      Comment: vulvectomy per Dr. Sean Jurado  No date: OTHER SURGICAL HISTORY      Comment: perire light within reach;RN aware of session/findings; All patient questions and concerns addressed;SCDs in place; Alarm set    ASSESSMENT   Patient is a 80year old female admitted 8/18/2018 for n/v.   In OT session 2 of 5 patient is progressing with the following

## 2018-08-28 NOTE — IMAGING NOTE
Transgluteal drainage catheter placed for a markedly distended Ventura's pouch. 370 ml output last 24 hrs.   CPM.

## 2018-08-29 LAB
ANION GAP SERPL CALC-SCNC: 6 MMOL/L (ref 0–18)
BASOPHILS # BLD AUTO: 0.03 X10(3) UL (ref 0–0.1)
BASOPHILS NFR BLD AUTO: 0.5 %
BUN BLD-MCNC: 13 MG/DL (ref 8–20)
BUN/CREAT SERPL: 7.3 (ref 10–20)
CALCIUM BLD-MCNC: 7.3 MG/DL (ref 8.3–10.3)
CHLORIDE SERPL-SCNC: 113 MMOL/L (ref 101–111)
CO2 SERPL-SCNC: 22 MMOL/L (ref 22–32)
CREAT BLD-MCNC: 1.78 MG/DL (ref 0.55–1.02)
EOSINOPHIL # BLD AUTO: 0.26 X10(3) UL (ref 0–0.3)
EOSINOPHIL NFR BLD AUTO: 4.4 %
ERYTHROCYTE [DISTWIDTH] IN BLOOD BY AUTOMATED COUNT: 22.1 % (ref 11.5–16)
GLUCOSE BLD-MCNC: 86 MG/DL (ref 70–99)
HAV IGM SER QL: 1.7 MG/DL (ref 1.8–2.5)
HCT VFR BLD AUTO: 22.8 % (ref 34–50)
HGB BLD-MCNC: 7.3 G/DL (ref 12–16)
IMMATURE GRANULOCYTE COUNT: 0.1 X10(3) UL (ref 0–1)
IMMATURE GRANULOCYTE RATIO %: 1.7 %
LYMPHOCYTES # BLD AUTO: 0.38 X10(3) UL (ref 0.9–4)
LYMPHOCYTES NFR BLD AUTO: 6.4 %
MCH RBC QN AUTO: 23.4 PG (ref 27–33.2)
MCHC RBC AUTO-ENTMCNC: 32 G/DL (ref 31–37)
MCV RBC AUTO: 73.1 FL (ref 81–100)
MONOCYTES # BLD AUTO: 0.71 X10(3) UL (ref 0.1–1)
MONOCYTES NFR BLD AUTO: 11.9 %
NEUTROPHIL ABS PRELIM: 4.47 X10 (3) UL (ref 1.3–6.7)
NEUTROPHILS # BLD AUTO: 4.47 X10(3) UL (ref 1.3–6.7)
NEUTROPHILS NFR BLD AUTO: 75.1 %
OSMOLALITY SERPL CALC.SUM OF ELEC: 291 MOSM/KG (ref 275–295)
PHOSPHATE SERPL-MCNC: 2.1 MG/DL (ref 2.5–4.9)
PLATELET # BLD AUTO: 217 10(3)UL (ref 150–450)
PLATELET MORPHOLOGY: NORMAL
POTASSIUM SERPL-SCNC: 5.2 MMOL/L (ref 3.6–5.1)
RBC # BLD AUTO: 3.12 X10(6)UL (ref 3.8–5.1)
RED CELL DISTRIBUTION WIDTH-SD: 55.5 FL (ref 35.1–46.3)
SODIUM SERPL-SCNC: 141 MMOL/L (ref 136–144)
WBC # BLD AUTO: 6 X10(3) UL (ref 4–13)

## 2018-08-29 PROCEDURE — 99233 SBSQ HOSP IP/OBS HIGH 50: CPT | Performed by: INTERNAL MEDICINE

## 2018-08-29 PROCEDURE — 99232 SBSQ HOSP IP/OBS MODERATE 35: CPT | Performed by: CLINICAL NURSE SPECIALIST

## 2018-08-29 RX ORDER — MAGNESIUM SULFATE HEPTAHYDRATE 40 MG/ML
2 INJECTION, SOLUTION INTRAVENOUS ONCE
Status: COMPLETED | OUTPATIENT
Start: 2018-08-29 | End: 2018-08-29

## 2018-08-29 NOTE — PROGRESS NOTES
Pt is alert and oriented x 4. Belly soft , rounded, bowel sounds positive, can be anxious, debo on the floor and bed alarm is on, pt fell during her visit. RA lungs sound clear MONSERRAT no c-pap. Nunes with yellow urine .  Percutaneous drain to left buttock was

## 2018-08-29 NOTE — PROGRESS NOTES
Name:Daysi Polk  [facility]#:[id number]  :1934      Subjective:  Buttock pain from drain is better controlled now. Objective:  Drain site from transgluteal drain is clean dry and intact.     Vital Signs:  Blood pressure 160/73, pulse 81, tempera

## 2018-08-29 NOTE — PROGRESS NOTES
[facility]  Nephrology Progress Note    Fareed Finnegan Attending:  Yariel Palomino MD       Assessment and Plan:    1) TANIA- due to volume depletion with modest PO intake + high output ostomy (which improved at SNF with IVF) exac by urinary retent Without clubbing, cyanosis; no edema  Neurologic: Cranial nerves grossly intact, moving all extremities  Skin: Warm and dry, no rashes       Labs:     Lab Results  Component Value Date   WBC 6.0 08/29/2018   HGB 7.3 08/29/2018   HCT 22.8 08/29/2018   PLT 2 injection 4 mg 4 mg Intravenous Q4H PRN   LORazepam (ATIVAN) injection 0.5 mg 0.5 mg Intravenous Once   ondansetron HCl (ZOFRAN) injection 4 mg 4 mg Intravenous Q6H PRN     Facility-Administered Medications Ordered in Other Encounters:  Darbepoetin [place] (A

## 2018-08-29 NOTE — PROGRESS NOTES
Surgical Oncology Inpatient Progress Note    S: Overall feels well. Some pain at drain site. Improved from yesterday.     O:  /70 (BP Location: Right arm)   Pulse 91   Temp 97.7 °F (36.5 °C) (Oral)   Resp 20   Ht 1.626 m (5' 4\")   Wt 55.8 kg (123 lb)

## 2018-08-29 NOTE — PROGRESS NOTES
[facility] Hospitalist note    PCP: Jay Harvey MD    Chief Complaint:  81 yo woman with advanced rectal cancer here with weakness, poor oral intake and decreased UOP    SUBJECTIVE:  Ms. Rozena Fothergill states she feels claustrophobic and she doesn't like the so --   107*  94  91  94  88  86    < > = values in this interval not displayed.        Recent Labs   Lab  08/25/18   0535  08/27/18   0519  08/28/18   0520   ALT  12*  10*  10*   AST  13*  10*  10*   ALB  2.5*  2.5*  2.3*       No results for input(s): PGLU i mackenzie. Cards saw during admission, patient declined BB. Improved    AI recommended on discharge, patient is accepted at the [address] [place] [address]  Possible discharge 8/30 once able to be off IVF. Discussed plan of care with Dr. Jules Fung.    Danny MORAC  Pager: [phone]

## 2018-08-29 NOTE — PROGRESS NOTES
Hem/Onc Inpatient  Note    Patient Name: Yossi Gaxiola   YOB: 1934   Medical Record Number: [id number]   CSN: [id number]      Chief Complaint:  Rectal Carcinoma, Obstruction of Ventura's pouch     S:   How long will this drain be in?    A tender with good bowel sounds. Extremities: Pedal pulses are present. No edema. Neurological: Grossly intact. Lymphatics: There is no palpable lymphadenopathy throughout in the cervical, supraclavicular, axillary, or inguinal regions.   Psych/Depression echogenicity. No significant masses. BILIARY:  Normal appearing gallbladder, intrahepatic ducts, and common bile duct. Common bile duct diameter is 5 mm. No sonographic Dorsey's sign was elicited.   The gallbladder wall is upper normal in thickness measu

## 2018-08-29 NOTE — OCCUPATIONAL THERAPY NOTE
OCCUPATIONAL THERAPY TREATMENT NOTE - INPATIENT     Room Number: 321/321-A  Session: 2   Number of Visits to Meet Established Goals: 3    Presenting Problem: n/v, rectal tumor    History related to current admission: Pt was admitted from [facility] 8/18/2018 Anemia    Intractable vomiting with nausea    Urinary retention    Hydronephrosis, unspecified hydronephrosis type    Nausea and vomiting    Cancer associated pain    Anemia, chronic disease    CKD (chronic kidney disease) stage 3, GFR 30-59 ml/min ([facility]) ENDOSCOPY  No date: D & C  No date: ILEOSTOMY/JEJUNOSTOMY,NONTUBE  No date: OTHER SURGICAL HISTORY      Comment: colon resection x 2  2003: OTHER SURGICAL HISTORY      Comment: vulvectomy per Dr. Karl Cabrera  No date: OTHER SURGICAL HISTORY      Comment: [facility] Rn with new ileostomy bag arrived and pt unwilling to continue, needing to lay down to have it changed. , Pt returned to supine with min assist.  Patient End of Session: In bed; With [facility] staff;Needs met;RN aware of session/findings;Call light withi

## 2018-08-30 ENCOUNTER — APPOINTMENT (OUTPATIENT)
Dept: CT IMAGING | Facility: HOSPITAL | Age: 83
DRG: 375 | End: 2018-08-30
Attending: HOSPITALIST
Payer: MEDICARE

## 2018-08-30 ENCOUNTER — APPOINTMENT (OUTPATIENT)
Dept: HEMATOLOGY/ONCOLOGY | Age: 83
End: 2018-08-30
Attending: INTERNAL MEDICINE
Payer: MEDICARE

## 2018-08-30 LAB
ANION GAP SERPL CALC-SCNC: 9 MMOL/L (ref 0–18)
BASOPHILS # BLD AUTO: 0.04 X10(3) UL (ref 0–0.1)
BASOPHILS NFR BLD AUTO: 0.6 %
BUN BLD-MCNC: 13 MG/DL (ref 8–20)
BUN/CREAT SERPL: 6.7 (ref 10–20)
CALCIUM BLD-MCNC: 7.2 MG/DL (ref 8.3–10.3)
CHLORIDE SERPL-SCNC: 114 MMOL/L (ref 101–111)
CO2 SERPL-SCNC: 20 MMOL/L (ref 22–32)
CREAT BLD-MCNC: 1.94 MG/DL (ref 0.55–1.02)
EOSINOPHIL # BLD AUTO: 0.26 X10(3) UL (ref 0–0.3)
EOSINOPHIL NFR BLD AUTO: 4 %
ERYTHROCYTE [DISTWIDTH] IN BLOOD BY AUTOMATED COUNT: 22 % (ref 11.5–16)
GLUCOSE BLD-MCNC: 91 MG/DL (ref 70–99)
HAV IGM SER QL: 2 MG/DL (ref 1.8–2.5)
HCT VFR BLD AUTO: 22.1 % (ref 34–50)
HGB BLD-MCNC: 7.2 G/DL (ref 12–16)
IMMATURE GRANULOCYTE COUNT: 0.08 X10(3) UL (ref 0–1)
IMMATURE GRANULOCYTE RATIO %: 1.2 %
LYMPHOCYTES # BLD AUTO: 0.49 X10(3) UL (ref 0.9–4)
LYMPHOCYTES NFR BLD AUTO: 7.5 %
MCH RBC QN AUTO: 23.5 PG (ref 27–33.2)
MCHC RBC AUTO-ENTMCNC: 32.6 G/DL (ref 31–37)
MCV RBC AUTO: 72.2 FL (ref 81–100)
MONOCYTES # BLD AUTO: 0.71 X10(3) UL (ref 0.1–1)
MONOCYTES NFR BLD AUTO: 10.9 %
NEUTROPHIL ABS PRELIM: 4.92 X10 (3) UL (ref 1.3–6.7)
NEUTROPHILS # BLD AUTO: 4.92 X10(3) UL (ref 1.3–6.7)
NEUTROPHILS NFR BLD AUTO: 75.8 %
OSMOLALITY SERPL CALC.SUM OF ELEC: 296 MOSM/KG (ref 275–295)
PHOSPHATE SERPL-MCNC: 3.7 MG/DL (ref 2.5–4.9)
PLATELET # BLD AUTO: 218 10(3)UL (ref 150–450)
POTASSIUM SERPL-SCNC: 4.8 MMOL/L (ref 3.6–5.1)
RBC # BLD AUTO: 3.06 X10(6)UL (ref 3.8–5.1)
RED CELL DISTRIBUTION WIDTH-SD: 53.5 FL (ref 35.1–46.3)
SODIUM SERPL-SCNC: 143 MMOL/L (ref 136–144)
WBC # BLD AUTO: 6.5 X10(3) UL (ref 4–13)

## 2018-08-30 PROCEDURE — 74176 CT ABD & PELVIS W/O CONTRAST: CPT | Performed by: HOSPITALIST

## 2018-08-30 PROCEDURE — 99232 SBSQ HOSP IP/OBS MODERATE 35: CPT | Performed by: NURSE PRACTITIONER

## 2018-08-30 NOTE — CM/SW NOTE
georgiana notified that pt may be ready for DC today. georgiana confirmed bed at the [facility]. georgiana discussed transportaiton for pt. She may need medicar upon DC if she cannot secure a ride. She is aware and in agreement of the charges.      georgiana arranged [facility] on wi

## 2018-08-30 NOTE — PROGRESS NOTES
[facility] Hospitalist note    PCP: Shira Hollis MD    Chief Complaint:  79 yo woman with advanced rectal cancer here with weakness, poor oral intake and decreased UOP    SUBJECTIVE:  Ms. Ivone Birch is calm and sleeping after po Xanax.  Complained of right si last 168 hours. No results for input(s): TROP in the last 168 hours.       Meds:     • sodium bicarbonate  1,300 mg Oral TID AC   • Heparin Lock Flush  1.5 mL Intravenous Once   • Heparin Sodium (Porcine)  5,000 Units Subcutaneous Q8H [facility]   • [address]    .Addendum:    Patient seen and examined independently. Agree with above  Discussed with Dr. Ubaldo Nj as well. S: pt concerned about a pulling sensation at her R buttock.  She states that she always gets this sensation when she has an abscess- she

## 2018-08-30 NOTE — PROGRESS NOTES
[facility]  Nephrology Progress Note    Carrie Higuera Attending:  Jett Anderson MD       Assessment and Plan:    1) TANIA- due to volume depletion with modest PO intake + high output ostomy (which improved at SNF with IVF) exac by urinary retent thyromegaly  Cardiac: Regular rate and rhythm, S1, S2 normal, no murmur or tub  Lungs: Decreased BS at bases bilaterally   Abdomen: Soft, non-tender. + bowel sounds, no palpable organomegaly  Extremities: Without clubbing, cyanosis; no edema  Neurologic: C Daily   HYDROcodone-acetaminophen (NORCO) 5-325 MG per tab 1 tablet 1 tablet Oral Q4H PRN   Or      HYDROcodone-acetaminophen (NORCO) 5-325 MG per tab 2 tablet 2 tablet Oral Q4H PRN   ondansetron HCl (ZOFRAN) injection 4 mg 4 mg Intravenous Q4H PRN   Michael Kumar

## 2018-08-30 NOTE — PROGRESS NOTES
Surgical Oncology Inpatient Progress Note    Nurse states patient was complaining of rectal pain which felt the same as when she had previous rectal abscess.  Upon entering the room the patient states pain has completely resolved and her only pain is at her patient states her earlier rectal pain has resolved. No discernable abscess. Discussed frequent re-positioning to prevent pressure sores. Patient comfortable and will continue to monitor site.     Plan is to discharge to rehab or home with brother and [place]

## 2018-08-30 NOTE — PROGRESS NOTES
Vss. Pt resting in bed this am. Drain to left buttocks flushed at this time with 10ml of saline without difficulty. 10 ml of yellow, clear fluid aspirated from drain. Pt tolerated well. Drain site c/d/i. Pt denies pain.  Pt tolerating regular diet well for

## 2018-08-30 NOTE — PROGRESS NOTES
Pt assisted to bathroom at this time. Pt voided 150ml and had a pvr of 110. Messaged left with Tommie Hull with urology. Pt denies discomfort. Will continue to monitor.

## 2018-08-30 NOTE — PROGRESS NOTES
Drain to left buttock flushed at this time again per md order with 10ml of sterile saline and 10ml of clear, yellow drainage aspirated. Pt tolerated well. Dressing c/d/i. Will continue to monitor.

## 2018-08-30 NOTE — PROGRESS NOTES
Hem/Onc Inpatient  Note    Patient Name: Ciera Barnes   YOB: 1934   Medical Record Number: [id number]   CSN: [id number]      Chief Complaint:  Rectal Carcinoma, Obstruction of Ventura's pouch     S: Patient having new rectal pain and pr rhythm. Abdomen: Soft, non tender with good bowel sounds. Rectal: no swelling or palpable finding noted   Extremities: Pedal pulses are present. No edema. Neurological: Grossly intact. Lymphatics:  There is no palpable lymphadenopathy throughout in th pancreas, spleen, kidneys, IVC, and aorta. PATIENT STATED HISTORY: (As transcribed by Technologist)  Patient has a history of hydronephrosis. FINDINGS:    LIVER:  Normal size and echogenicity. No significant masses.   BILIARY:  Normal appearing

## 2018-08-30 NOTE — PROGRESS NOTES
Pt resting in bed. Ct scan results received, Dr. Raquel Javed paged and new orders received to discontinue naqvi. Pt updated on poc. Will continue to monitor.     1105: Dr. Tracie Babinski on floor and stated to check with urology to make sure it is ok to discontinue naqvi c

## 2018-08-30 NOTE — PROGRESS NOTES
Nunes d/c'd at this time per order. Pt educated on voiding expectations and shows understanding. Will continue to monitor.

## 2018-08-30 NOTE — PROGRESS NOTES
Renetta Castro [place] with surgery notified of new consult. Dr. William Miner paged as well at this time regarding new consult. 1246: Dr. William Miner paged regarding new consult and stated to defer to Dr. Nany Alvarez regarding rectal abscess.  Will page Dr. Nany Alvarez.     1300: Yesenia Haines

## 2018-08-30 NOTE — PROGRESS NOTES
Pt resting comfortably in bed. Pt reports rectal pain from this am as resolved. Tommie Winn with Dr. Matt Pablo on floor to see patient and no further orders received.  Pt dtv after naqvi removed this afternoon, pt updated on voiding expectations and shows under

## 2018-08-31 LAB
ANION GAP SERPL CALC-SCNC: 7 MMOL/L (ref 0–18)
ANTIBODY SCREEN: NEGATIVE
BASOPHILS # BLD AUTO: 0.04 X10(3) UL (ref 0–0.1)
BASOPHILS NFR BLD AUTO: 0.6 %
BUN BLD-MCNC: 12 MG/DL (ref 8–20)
BUN/CREAT SERPL: 6.2 (ref 10–20)
CALCIUM BLD-MCNC: 7.8 MG/DL (ref 8.3–10.3)
CHLORIDE SERPL-SCNC: 114 MMOL/L (ref 101–111)
CO2 SERPL-SCNC: 22 MMOL/L (ref 22–32)
CREAT BLD-MCNC: 1.94 MG/DL (ref 0.55–1.02)
EOSINOPHIL # BLD AUTO: 0.24 X10(3) UL (ref 0–0.3)
EOSINOPHIL NFR BLD AUTO: 3.8 %
ERYTHROCYTE [DISTWIDTH] IN BLOOD BY AUTOMATED COUNT: 22.4 % (ref 11.5–16)
GLUCOSE BLD-MCNC: 89 MG/DL (ref 70–99)
HAV IGM SER QL: 1.6 MG/DL (ref 1.8–2.5)
HCT VFR BLD AUTO: 21.9 % (ref 34–50)
HGB BLD-MCNC: 7 G/DL (ref 12–16)
HGB BLD-MCNC: 9.1 G/DL (ref 12–16)
IMMATURE GRANULOCYTE COUNT: 0.1 X10(3) UL (ref 0–1)
IMMATURE GRANULOCYTE RATIO %: 1.6 %
LYMPHOCYTES # BLD AUTO: 0.46 X10(3) UL (ref 0.9–4)
LYMPHOCYTES NFR BLD AUTO: 7.3 %
MCH RBC QN AUTO: 23 PG (ref 27–33.2)
MCHC RBC AUTO-ENTMCNC: 32 G/DL (ref 31–37)
MCV RBC AUTO: 72 FL (ref 81–100)
MONOCYTES # BLD AUTO: 0.57 X10(3) UL (ref 0.1–1)
MONOCYTES NFR BLD AUTO: 9 %
NEUTROPHIL ABS PRELIM: 4.93 X10 (3) UL (ref 1.3–6.7)
NEUTROPHILS # BLD AUTO: 4.93 X10(3) UL (ref 1.3–6.7)
NEUTROPHILS NFR BLD AUTO: 77.7 %
OSMOLALITY SERPL CALC.SUM OF ELEC: 295 MOSM/KG (ref 275–295)
PHOSPHATE SERPL-MCNC: 2.8 MG/DL (ref 2.5–4.9)
PLATELET # BLD AUTO: 205 10(3)UL (ref 150–450)
POTASSIUM SERPL-SCNC: 4.7 MMOL/L (ref 3.6–5.1)
RBC # BLD AUTO: 3.04 X10(6)UL (ref 3.8–5.1)
RED CELL DISTRIBUTION WIDTH-SD: 55.3 FL (ref 35.1–46.3)
RH BLOOD TYPE: POSITIVE
SODIUM SERPL-SCNC: 143 MMOL/L (ref 136–144)
WBC # BLD AUTO: 6.3 X10(3) UL (ref 4–13)

## 2018-08-31 PROCEDURE — 30233N1 TRANSFUSION OF NONAUTOLOGOUS RED BLOOD CELLS INTO PERIPHERAL VEIN, PERCUTANEOUS APPROACH: ICD-10-PCS | Performed by: HOSPITALIST

## 2018-08-31 PROCEDURE — 99232 SBSQ HOSP IP/OBS MODERATE 35: CPT | Performed by: INTERNAL MEDICINE

## 2018-08-31 RX ORDER — SODIUM BICARBONATE 650 MG/1
1300 TABLET ORAL 3 TIMES DAILY
Qty: 120 TABLET | Refills: 3 | Status: SHIPPED | COMMUNITY
Start: 2018-08-31 | End: 2018-11-09

## 2018-08-31 RX ORDER — ECHINACEA PURPUREA EXTRACT 125 MG
1 TABLET ORAL
Status: DISCONTINUED | OUTPATIENT
Start: 2018-08-31 | End: 2018-09-01

## 2018-08-31 RX ORDER — ALPRAZOLAM 1 MG/1
1 TABLET ORAL 4 TIMES DAILY PRN
Qty: 20 TABLET | Refills: 0 | Status: SHIPPED | OUTPATIENT
Start: 2018-08-31 | End: 2019-01-01 | Stop reason: ALTCHOICE

## 2018-08-31 RX ORDER — TRAZODONE HYDROCHLORIDE 50 MG/1
25 TABLET ORAL NIGHTLY PRN
Qty: 30 TABLET | Refills: 0 | Status: SHIPPED | OUTPATIENT
Start: 2018-08-31 | End: 2019-01-01

## 2018-08-31 RX ORDER — SODIUM CHLORIDE 9 MG/ML
INJECTION, SOLUTION INTRAVENOUS ONCE
Status: COMPLETED | OUTPATIENT
Start: 2018-08-31 | End: 2018-08-31

## 2018-08-31 RX ORDER — HYDROCODONE BITARTRATE AND ACETAMINOPHEN 5; 325 MG/1; MG/1
1 TABLET ORAL EVERY 4 HOURS PRN
Qty: 20 TABLET | Refills: 0 | Status: SHIPPED | OUTPATIENT
Start: 2018-08-31 | End: 2018-09-27

## 2018-08-31 RX ORDER — ACETAMINOPHEN 325 MG/1
325 TABLET ORAL EVERY 4 HOURS PRN
Qty: 30 TABLET | Refills: 0 | Status: SHIPPED | OUTPATIENT
Start: 2018-08-31 | End: 2019-01-09

## 2018-08-31 RX ORDER — ECHINACEA PURPUREA EXTRACT 125 MG
1 TABLET ORAL
Qty: 30 ML | Refills: 0 | Status: ON HOLD | OUTPATIENT
Start: 2018-08-31 | End: 2019-01-01

## 2018-08-31 NOTE — PROGRESS NOTES
[facility]  Nephrology Progress Note    Rik Mukherjee Attending:  Tiffanie Velásquez MD       Assessment and Plan:    1) TANIA- due to volume depletion with modest PO intake + high output ostomy (which improved at SNF with IVF) exac by urinary retent tub  Lungs: Decreased BS at bases bilaterally   Abdomen: Soft, non-tender. + bowel sounds, no palpable organomegaly  Extremities: Without clubbing, cyanosis; no edema  Neurologic: Cranial nerves grossly intact, moving all extremities  Skin: Warm and dry, n Nightly PRN   folic acid (FOLVITE) tab 1 mg 1 mg Oral Daily   HYDROcodone-acetaminophen (NORCO) 5-325 MG per tab 1 tablet 1 tablet Oral Q4H PRN   Or      HYDROcodone-acetaminophen (NORCO) 5-325 MG per tab 2 tablet 2 tablet Oral Q4H PRN   ondansetron HCl (Z

## 2018-08-31 NOTE — PROGRESS NOTES
[facility]  Progress Note      [facility] Patient Status:  Inpatient    1934 MRN [id number]   [facility] 3NW-A Attending Rinku Johnson MD   Hosp Day # 15 PCP Reva Vaca MD       Subjective:   Feels better this AM    Ob

## 2018-08-31 NOTE — PROGRESS NOTES
VSS afebrile  Patient tolerating blood without difficulty. Denies any nausea/discomfort. Denies rash/itching.

## 2018-08-31 NOTE — PROGRESS NOTES
Hem/Onc Inpatient  Note    Patient Name: Gerry Meigs   YOB: 1934   Medical Record Number: [id number]   CSN: [id number]      Chief Complaint:  Rectal carcinoma, Obstruction of Ventura's pouch, Anemia  S:   I get to go later today after Neck: No JVD. No palpable lymphadenopathy. Neck is supple. Chest: Clear to auscultation. Heart: Regular rate and rhythm. Abdomen: Soft, non tender with good bowel sounds. Extremities: Pedal pulses are present. No edema.   Neurological: Grossly intact 8/18/2018, 15:55. [place], CT   CHEST+ABDOMEN+PELVIS(CPT=71250/03023), 4/14/2018, 15:05.      INDICATIONS:  f/u on hydronephrosis and compression of urinary sx from rectal distention (now decompressed)     TECHNIQUE:  Unenhanced multislice CT scanning w calcified mass within the ischiorectal fossa an apparent across the pelvic floor and involve the obturator internus muscle on the right. This has been described on prior studies. BONES:  No bony lesion or fracture.     LUNG BASES:  There are now large shaan

## 2018-08-31 NOTE — PHYSICAL THERAPY NOTE
PHYSICAL THERAPY TREATMENT NOTE - INPATIENT    Room Number: 321/321-A     Session: 4   Number of Visits to Meet Established Goals: 5    Presenting Problem: Urinary retention, hydronephrosis, and intractable N/V    History related to current admission: Pt with nausea, unspecified vomiting type  Active Problems:    Rectal cancer (HCC)    Hyponatremia    Anemia    Intractable vomiting with nausea    Urinary retention    Hydronephrosis, unspecified hydronephrosis type    Nausea and vomiting    Cancer associate Comment: Procedure: COLONOSCOPY;  Surgeon: Renetta Swan MD;  Location: [address] [place] [address]  No date: D & C  No date: ILEOSTOMY/JEJUNOSTOMY,NONTUBE  No date: OTHER SURGICAL HISTORY      Comment: colon resection x 2  2003: OTHER SURGICAL HISTORY STATUS  Gait Assessment   Gait Assistance: Minimum assistance  Distance (ft): 350  Assistive Device: Rolling walker  Pattern: Within Functional Limits  Stoop/Curb Assistance:  Moderate assistance  Comment :  (pt navigated 2 stairs with B UE support and mod mechanics; Endurance; Patient education;Gait training;Strengthening;Transfer training;Balance training;Stoop training  Rehab Potential : Guarded  Frequency (Obs): 5x/week    CURRENT GOALS   Goal #1 Patient is able to demonstrate supine - sit EOB @ level: sup

## 2018-08-31 NOTE — CM/SW NOTE
CHERRY called Marcie from [facility] who stated they can not accept the pt today. She stated they have 7 evening discharges and can not accept another patient. Pt getting blood.  RN stated the soonest the pt could leave is at 5:00pm. Marcie spoke w/the [facility] who

## 2018-08-31 NOTE — DISCHARGE SUMMARY
General Medicine Discharge Summary     Patient ID:  Medardo Wilhelm  80year old  7/28/1934    Admit date: 8/18/2018    Discharge date and time: 8/31/2018    Attending Physician: Turner Griffin MD     P fullness 8/30- no new recommendations     2) TANIA /Hyperkalemia - resolved  - renal consult appreciated during admission. [place] to be 2/2 intravascular depletion and urine retention.    - on sodium bicarb tid per renal for chronic metabolic acidosis  - IVF co needed.   What changed:  · medication strength  · how much to take  · when to take this  · reasons to take this        CONTINUE taking these medications    diphenoxylate-atropine 2.5-0.025 MG Tabs  Commonly known as:  LOMOTIL  Take 1-2 tablets by mouth 4 (f seen and examined independently. Agree with above. Pt feeling okay. No more of the pulling sensation in her buttock. Had slight nose bleed.  No sob    Exam  Sleepy during my visit  rrr  ctab  abd soft, ostomy in place    Plan  hgb 7-- would like to give

## 2018-08-31 NOTE — OCCUPATIONAL THERAPY NOTE
Attempted to see pt for skilled OT services this date. Pt is currently refusing stating, \"I am not doing anything.  I just got up 2 hours ago with PT\" Pt has multiple complaints regarding this hospital stay, encouraged pt to speak with \"patient experienc

## 2018-09-01 VITALS
HEART RATE: 99 BPM | RESPIRATION RATE: 18 BRPM | SYSTOLIC BLOOD PRESSURE: 152 MMHG | WEIGHT: 122.13 LBS | OXYGEN SATURATION: 92 % | BODY MASS INDEX: 20.85 KG/M2 | DIASTOLIC BLOOD PRESSURE: 62 MMHG | TEMPERATURE: 98 F | HEIGHT: 64 IN

## 2018-09-01 LAB — BLOOD TYPE BARCODE: 6200

## 2018-09-01 PROCEDURE — 99231 SBSQ HOSP IP/OBS SF/LOW 25: CPT | Performed by: INTERNAL MEDICINE

## 2018-09-01 NOTE — CM/SW NOTE
MSW spoke with the [facility]. They are able to accept the patient at 1200 today. MSW spoke with RN. Patient is appropriate for medicar. MSW called [facility] and arranged wheelchair Karlie Alamo for 1200 .      Karlene Nugent 90: [phone]

## 2018-09-01 NOTE — PROGRESS NOTES
[facility]  Nephrology Progress Note    Dane Ramos Attending:  Lennie Parra MD       Assessment and Plan:    1) TANIA- due to volume depletion with modest PO intake + high output ostomy (which improved at SNF with IVF) exac by urinary retent Medications:  [START ON 9/3/2018] epoetin rex (EPOGEN,PROCRIT) injection 10,000 Units 10,000 Units Subcutaneous Once per day on Mon Wed Fri   Saline Nasal Spray (SALINE MIST) 1 spray 1 spray Each Nare Q3H PRN   sodium bicarbonate tab 1,300 mg 1,300 mg Ora

## 2018-09-01 NOTE — PROGRESS NOTES
NURSING DISCHARGE NOTE    Discharged pt via medicar to the [facility]. Accompanied by [employer] staff. Belongings at hand. IV catheter d/c'd; catheter intact. Discharge instruction and education given and faxed to the [facility].  Script given at

## 2018-09-01 NOTE — PLAN OF CARE
DISCHARGE PLANNING    • Discharge to home or other facility with appropriate resources Completed        GASTROINTESTINAL - ADULT    • Minimal or absence of nausea and vomiting Completed    • Maintains or returns to baseline bowel function Completed Report given to [place] at the [facility]. 1135: Patient refusing for IV to be removed. Explained to patient that it is not a PICC line or Midline and cannot stay in for long periods of time as well as no indication for any IV infusions of medications.  Dr. Phoebe Albright

## 2018-09-02 ENCOUNTER — NURSE ONLY (OUTPATIENT)
Dept: LAB | Age: 83
End: 2018-09-02
Attending: FAMILY MEDICINE
Payer: MEDICARE

## 2018-09-02 DIAGNOSIS — Z12.12 SCREENING FOR MALIGNANT NEOPLASM OF THE RECTUM: Primary | ICD-10-CM

## 2018-09-02 DIAGNOSIS — R53.1 WEAKNESS: ICD-10-CM

## 2018-09-02 DIAGNOSIS — K50.914: ICD-10-CM

## 2018-09-02 LAB
ALBUMIN SERPL-MCNC: 3.2 G/DL (ref 3.5–4.8)
ALBUMIN/GLOB SERPL: 0.8 {RATIO} (ref 1–2)
ALP LIVER SERPL-CCNC: 158 U/L (ref 55–142)
ALT SERPL-CCNC: 15 U/L (ref 14–54)
ANION GAP SERPL CALC-SCNC: 12 MMOL/L (ref 0–18)
AST SERPL-CCNC: 20 U/L (ref 15–41)
BASOPHILS # BLD AUTO: 0.06 X10(3) UL (ref 0–0.1)
BASOPHILS NFR BLD AUTO: 0.6 %
BILIRUB SERPL-MCNC: 1 MG/DL (ref 0.1–2)
BUN BLD-MCNC: 17 MG/DL (ref 8–20)
BUN/CREAT SERPL: 7.4 (ref 10–20)
CALCIUM BLD-MCNC: 8.4 MG/DL (ref 8.3–10.3)
CHLORIDE SERPL-SCNC: 112 MMOL/L (ref 101–111)
CO2 SERPL-SCNC: 15 MMOL/L (ref 22–32)
CREAT BLD-MCNC: 2.29 MG/DL (ref 0.55–1.02)
EOSINOPHIL # BLD AUTO: 0.26 X10(3) UL (ref 0–0.3)
EOSINOPHIL NFR BLD AUTO: 2.5 %
ERYTHROCYTE [DISTWIDTH] IN BLOOD BY AUTOMATED COUNT: 22.8 % (ref 11.5–16)
GLOBULIN PLAS-MCNC: 3.8 G/DL (ref 2.5–4)
GLUCOSE BLD-MCNC: 80 MG/DL (ref 70–99)
HCT VFR BLD AUTO: 32.4 % (ref 34–50)
HGB BLD-MCNC: 10 G/DL (ref 12–16)
IMMATURE GRANULOCYTE COUNT: 0.12 X10(3) UL (ref 0–1)
IMMATURE GRANULOCYTE RATIO %: 1.2 %
LYMPHOCYTES # BLD AUTO: 0.68 X10(3) UL (ref 0.9–4)
LYMPHOCYTES NFR BLD AUTO: 6.6 %
M PROTEIN MFR SERPL ELPH: 7 G/DL (ref 6.1–8.3)
MCH RBC QN AUTO: 23.3 PG (ref 27–33.2)
MCHC RBC AUTO-ENTMCNC: 30.9 G/DL (ref 31–37)
MCV RBC AUTO: 75.3 FL (ref 81–100)
MONOCYTES # BLD AUTO: 0.92 X10(3) UL (ref 0.1–1)
MONOCYTES NFR BLD AUTO: 8.9 %
NEUTROPHIL ABS PRELIM: 8.33 X10 (3) UL (ref 1.3–6.7)
NEUTROPHILS # BLD AUTO: 8.33 X10(3) UL (ref 1.3–6.7)
NEUTROPHILS NFR BLD AUTO: 80.2 %
OSMOLALITY SERPL CALC.SUM OF ELEC: 289 MOSM/KG (ref 275–295)
PLATELET # BLD AUTO: 242 10(3)UL (ref 150–450)
PLATELET MORPHOLOGY: NORMAL
POTASSIUM SERPL-SCNC: 5.1 MMOL/L (ref 3.6–5.1)
RBC # BLD AUTO: 4.3 X10(6)UL (ref 3.8–5.1)
RED CELL DISTRIBUTION WIDTH-SD: 58.1 FL (ref 35.1–46.3)
SODIUM SERPL-SCNC: 139 MMOL/L (ref 136–144)
WBC # BLD AUTO: 10.4 X10(3) UL (ref 4–13)

## 2018-09-02 PROCEDURE — 80053 COMPREHEN METABOLIC PANEL: CPT

## 2018-09-02 PROCEDURE — 85025 COMPLETE CBC W/AUTO DIFF WBC: CPT

## 2018-09-02 NOTE — DISCHARGE SUMMARY
General Medicine Discharge Summary     Patient ID:  Dea Fabry  80year old  7/28/1934    Admit date: 8/18/2018    Discharge date and time: 9/1/2018  1:30 PM     Attending Physician: Nohemi Byrd MD    Primary Care Physician: Heidi Jacobs MD declined BB.  Improved    Consults: IP CONSULT TO ONCOLOGY  IP CONSULT TO UROLOGY  IP CONSULT TO SPIRITUAL CARE  IP CONSULT TO GASTROENTEROLOGY  IP CONSULT TO CARDIOLOGY  IP CONSULT TO SOCIAL WORK  IP CONSULT TO SURGICAL ONCOLOGY  IP CONSULT TO VASCULAR ACC Historical    folic acid 1 MG Oral Tab  Take 1 mg by mouth daily. , Historical    !! - Potential duplicate medications found. Please discuss with provider.         Follow-up with   PCP  Renal  Oncology       Total Time Coordinating Care: Greater than 30 mi

## 2018-09-05 ENCOUNTER — TELEPHONE (OUTPATIENT)
Dept: HEMATOLOGY/ONCOLOGY | Facility: HOSPITAL | Age: 83
End: 2018-09-05

## 2018-09-05 ENCOUNTER — TELEPHONE (OUTPATIENT)
Dept: NEPHROLOGY | Facility: CLINIC | Age: 83
End: 2018-09-05

## 2018-09-05 NOTE — TELEPHONE ENCOUNTER
Received a call from [facility] asking for a CT scan order. According to RN, orders state to schedule a Ct scan when drain is less than a certain amount. Clarified with Jacki Freeman and Tommie Jamil (Dr Elisabet Núñez).  Hartmanns pouch, rectal catheter was santos

## 2018-09-08 ENCOUNTER — SNF ADMIT/H&P (OUTPATIENT)
Dept: FAMILY MEDICINE CLINIC | Facility: CLINIC | Age: 83
End: 2018-09-08

## 2018-09-08 VITALS
DIASTOLIC BLOOD PRESSURE: 72 MMHG | RESPIRATION RATE: 20 BRPM | OXYGEN SATURATION: 97 % | HEART RATE: 78 BPM | SYSTOLIC BLOOD PRESSURE: 133 MMHG | TEMPERATURE: 98 F

## 2018-09-08 DIAGNOSIS — R33.9 URINARY RETENTION: Primary | ICD-10-CM

## 2018-09-08 DIAGNOSIS — N18.4 CKD (CHRONIC KIDNEY DISEASE) STAGE 4, GFR 15-29 ML/MIN (HCC): ICD-10-CM

## 2018-09-08 DIAGNOSIS — N18.4 ANEMIA IN STAGE 4 CHRONIC KIDNEY DISEASE (HCC): ICD-10-CM

## 2018-09-08 DIAGNOSIS — R53.81 PHYSICAL DECONDITIONING: ICD-10-CM

## 2018-09-08 DIAGNOSIS — C20 RECTAL CANCER (HCC): ICD-10-CM

## 2018-09-08 DIAGNOSIS — N13.30 HYDRONEPHROSIS, UNSPECIFIED HYDRONEPHROSIS TYPE: ICD-10-CM

## 2018-09-08 DIAGNOSIS — D63.1 ANEMIA IN STAGE 4 CHRONIC KIDNEY DISEASE (HCC): ICD-10-CM

## 2018-09-08 PROCEDURE — 99306 1ST NF CARE HIGH MDM 50: CPT | Performed by: FAMILY MEDICINE

## 2018-09-08 NOTE — H&P
ED [facility], [address] [place] [address]    History and Physical    Gonzalez Rhoades Patient Status:  No patient class for patient encounter    1934 MRN [id number]   Location [facility] , [address] [place] [address] Attending No at Diagnosis Date   • ALLERGIC RHINITIS    • ANXIETY    • Anxiety state    • Autoimmune disease ([facility]    • Blood disorder    • Cancer ([facility]     vulva 14 years ago, tx with surgery alone.    • Crohn disease ([facility]    • Crohn's disease ([facility]    • Esophageal ref ear   • Other (angina[other]) Mother            • Heart Disorder Mother    • Diabetes Father    • Hypertension Father    • Hypertension Sister      Social History:  Social History    Tobacco Use      Smoking status: Former Smoker        Packs/day: tenderness. Percutaneous gluteal drain in place on the left gluteal region with no note of drainage. Lymphadenopathy:     She has no cervical adenopathy. Neurological: She is alert and oriented to person, place, and time. Skin: Skin is warm.    Psyc Medications:   •  acetaminophen 325 MG Oral Tab, Take 1 tablet (325 mg total) by mouth every 4 (four) hours as needed. , Disp: 30 tablet, Rfl: 0  •  TraZODone HCl 50 MG Oral Tab, Take 0.5 tablets (25 mg total) by mouth nightly as needed for Sleep., Disp: 30

## 2018-09-20 ENCOUNTER — OFFICE VISIT (OUTPATIENT)
Dept: SURGERY | Facility: CLINIC | Age: 83
End: 2018-09-20
Payer: MEDICARE

## 2018-09-20 ENCOUNTER — TELEPHONE (OUTPATIENT)
Dept: SURGERY | Facility: CLINIC | Age: 83
End: 2018-09-20

## 2018-09-20 VITALS
SYSTOLIC BLOOD PRESSURE: 103 MMHG | TEMPERATURE: 96 F | DIASTOLIC BLOOD PRESSURE: 66 MMHG | OXYGEN SATURATION: 99 % | HEART RATE: 86 BPM | RESPIRATION RATE: 16 BRPM

## 2018-09-20 DIAGNOSIS — C20 RECTAL CANCER (HCC): Primary | ICD-10-CM

## 2018-09-20 PROBLEM — R10.2 PELVIC PAIN: Status: RESOLVED | Noted: 2018-07-28 | Resolved: 2018-09-20

## 2018-09-20 PROBLEM — R26.9 ABNORMAL GAIT: Status: RESOLVED | Noted: 2017-10-20 | Resolved: 2018-09-20

## 2018-09-20 PROBLEM — D64.9 ANEMIA, UNSPECIFIED: Status: RESOLVED | Noted: 2018-07-28 | Resolved: 2018-09-20

## 2018-09-20 PROBLEM — R11.2 INTRACTABLE VOMITING WITH NAUSEA: Status: RESOLVED | Noted: 2018-08-18 | Resolved: 2018-09-20

## 2018-09-20 PROBLEM — K62.89 RECTAL MASS: Status: RESOLVED | Noted: 2017-12-13 | Resolved: 2018-09-20

## 2018-09-20 PROBLEM — H35.3221 EXUDATIVE AGE-RELATED MACULAR DEGENERATION OF LEFT EYE WITH ACTIVE CHOROIDAL NEOVASCULARIZATION (HCC): Status: RESOLVED | Noted: 2017-01-18 | Resolved: 2018-09-20

## 2018-09-20 PROBLEM — R11.2 INTRACTABLE VOMITING WITH NAUSEA, UNSPECIFIED VOMITING TYPE: Status: RESOLVED | Noted: 2018-08-18 | Resolved: 2018-09-20

## 2018-09-20 PROBLEM — D64.9 ANEMIA: Status: RESOLVED | Noted: 2018-08-18 | Resolved: 2018-09-20

## 2018-09-20 PROBLEM — R26.2 INABILITY TO WALK: Status: RESOLVED | Noted: 2017-12-13 | Resolved: 2018-09-20

## 2018-09-20 PROCEDURE — 99202 OFFICE O/P NEW SF 15 MIN: CPT | Performed by: PHYSICIAN ASSISTANT

## 2018-09-20 NOTE — TELEPHONE ENCOUNTER
Left message for home care RN from  to call back regarding patient. Follow up care to be coordinated with Dr. Zurdo Valadez office. Pt to return to see surg onc prn.

## 2018-09-23 ENCOUNTER — HOSPITAL ENCOUNTER (EMERGENCY)
Age: 83
Discharge: HOME | End: 2018-09-23
Attending: EMERGENCY MEDICINE
Payer: MEDICARE

## 2018-09-23 VITALS
TEMPERATURE: 98 F | SYSTOLIC BLOOD PRESSURE: 125 MMHG | HEART RATE: 78 BPM | DIASTOLIC BLOOD PRESSURE: 76 MMHG | HEIGHT: 65 IN | OXYGEN SATURATION: 100 % | RESPIRATION RATE: 18 BRPM | BODY MASS INDEX: 18.66 KG/M2 | WEIGHT: 112 LBS

## 2018-09-23 DIAGNOSIS — T85.9XXA COMPLICATION OF CATHETER, INITIAL ENCOUNTER: Primary | ICD-10-CM

## 2018-09-23 PROCEDURE — 99282 EMERGENCY DEPT VISIT SF MDM: CPT

## 2018-09-23 NOTE — ED INITIAL ASSESSMENT (HPI)
Patient states that she had tubing placed near the rectum to collect drainage. States that she bent over last night, felt tubing become loose, and fluid draining around tubing.

## 2018-09-23 NOTE — ED PROVIDER NOTES
Patient Seen in: [facility] Emergency Department In [place]    History   Patient presents with:  Anal Problem (GI)    Stated Complaint:     HPI    28-year-old female presents for evaluation of possible leaking around her percutaneous gluteal drain.   Drain Unspecified sleep apnea      Comment:  AHI 52/ supine 101/ sao2 88%  CPAP 11 [employer]  No date: Visual impairment  No date: Vulvar cancer ([facility]    Past Surgical History:  No date: APPENDECTOMY  No date: APPENDECTOMY  No date: COLECTOMY  No date: COLONOSCOPY src Temporal   SpO2 100 %   O2 Device None (Room air)       Current:/76   Pulse 78   Temp 97.7 °F (36.5 °C) (Temporal)   Resp 18   Ht 165.1 cm (5' 5\")   Wt 50.8 kg   SpO2 100%   BMI 18.64 kg/m²         Physical Exam    General: Alert, oriented, no a

## 2018-09-27 ENCOUNTER — OFFICE VISIT (OUTPATIENT)
Dept: HEMATOLOGY/ONCOLOGY | Age: 83
End: 2018-09-27
Attending: INTERNAL MEDICINE
Payer: MEDICARE

## 2018-09-27 VITALS
RESPIRATION RATE: 16 BRPM | DIASTOLIC BLOOD PRESSURE: 68 MMHG | TEMPERATURE: 97 F | OXYGEN SATURATION: 98 % | HEART RATE: 87 BPM | WEIGHT: 112.19 LBS | BODY MASS INDEX: 19 KG/M2 | SYSTOLIC BLOOD PRESSURE: 144 MMHG

## 2018-09-27 DIAGNOSIS — E86.0 DEHYDRATION: ICD-10-CM

## 2018-09-27 DIAGNOSIS — D50.0 IRON DEFICIENCY ANEMIA DUE TO CHRONIC BLOOD LOSS: ICD-10-CM

## 2018-09-27 DIAGNOSIS — C20 RECTAL CANCER (HCC): Primary | ICD-10-CM

## 2018-09-27 DIAGNOSIS — D50.0 IRON DEFICIENCY ANEMIA DUE TO CHRONIC BLOOD LOSS: Primary | ICD-10-CM

## 2018-09-27 PROCEDURE — 96372 THER/PROPH/DIAG INJ SC/IM: CPT

## 2018-09-27 PROCEDURE — 99214 OFFICE O/P EST MOD 30 MIN: CPT | Performed by: INTERNAL MEDICINE

## 2018-09-27 RX ORDER — ALPRAZOLAM 0.5 MG/1
0.5 TABLET ORAL 2 TIMES DAILY PRN
Qty: 60 TABLET | Refills: 3 | Status: SHIPPED | OUTPATIENT
Start: 2018-09-27 | End: 2019-01-09

## 2018-09-27 NOTE — PROGRESS NOTES
Patient is here for MD f/u for rectal cancer. Patient has a rectal drain in place. Occasional pain in rectum when sitting. Taking Tylenol with good relief. Patient is having some vaginal discharge. Discharged from rehab last week and is now home.  Asking if

## 2018-10-02 NOTE — PROGRESS NOTES
[facility]    PATIENT'S NAME: Anastasia Barros [place] KOBE   ATTENDING PHYSICIAN: Jerdo Herrera M.D.    PATIENT ACCOUNT #: [de-identified] LOCATION: [address] [place] [address] RECORD #: [id number] YOB: 1934   DATE OF SERVICE: 09/27/2018       CANCER She has not had any fevers or chills. She has been much more independent. Her brother came in from [place] and tried to get her to make arrangements, but she has pushed him off a bit.   She still has not set up definitive power of  for healthca thalassemia and a moderate degree of anemia. Her hemoglobin today was a little bit lower and we are going to continue her on erythropoietin injections in an effort to try to keep her hemoglobin a little bit higher.   She has been responding to this seeming

## 2018-10-08 ENCOUNTER — TELEPHONE (OUTPATIENT)
Dept: HEMATOLOGY/ONCOLOGY | Facility: HOSPITAL | Age: 83
End: 2018-10-08

## 2018-10-08 NOTE — TELEPHONE ENCOUNTER
Yang Vargas [facility] Nurse states that the gauze is sticking to the stiches that hold the [facility] pouch in place. She is wanting to know if okay to use petrolum jelly as barrier.      Discussed with KIRBY Butler who states that it is okay to use petrolum jelly, bu

## 2018-10-09 ENCOUNTER — LAB ENCOUNTER (OUTPATIENT)
Dept: LAB | Age: 83
End: 2018-10-09
Attending: INTERNAL MEDICINE
Payer: MEDICARE

## 2018-10-09 ENCOUNTER — OFFICE VISIT (OUTPATIENT)
Dept: NEPHROLOGY | Facility: CLINIC | Age: 83
End: 2018-10-09
Payer: MEDICARE

## 2018-10-09 ENCOUNTER — TELEPHONE (OUTPATIENT)
Dept: NEPHROLOGY | Facility: CLINIC | Age: 83
End: 2018-10-09

## 2018-10-09 VITALS
WEIGHT: 115 LBS | RESPIRATION RATE: 15 BRPM | HEART RATE: 68 BPM | SYSTOLIC BLOOD PRESSURE: 116 MMHG | DIASTOLIC BLOOD PRESSURE: 64 MMHG | BODY MASS INDEX: 19 KG/M2

## 2018-10-09 DIAGNOSIS — E83.42 HYPOMAGNESEMIA: ICD-10-CM

## 2018-10-09 DIAGNOSIS — N17.9 AKI (ACUTE KIDNEY INJURY) (HCC): ICD-10-CM

## 2018-10-09 DIAGNOSIS — E87.2 METABOLIC ACIDOSIS: ICD-10-CM

## 2018-10-09 DIAGNOSIS — E83.39 HYPOPHOSPHATASIA: ICD-10-CM

## 2018-10-09 DIAGNOSIS — N18.30 CKD (CHRONIC KIDNEY DISEASE) STAGE 3, GFR 30-59 ML/MIN (HCC): ICD-10-CM

## 2018-10-09 DIAGNOSIS — M80.00XD AGE-RELATED OSTEOPOROSIS WITH CURRENT PATHOLOGICAL FRACTURE WITH ROUTINE HEALING, SUBSEQUENT ENCOUNTER: ICD-10-CM

## 2018-10-09 DIAGNOSIS — E87.5 HYPERKALEMIA: ICD-10-CM

## 2018-10-09 DIAGNOSIS — N18.30 CKD (CHRONIC KIDNEY DISEASE) STAGE 3, GFR 30-59 ML/MIN (HCC): Primary | ICD-10-CM

## 2018-10-09 PROCEDURE — 83735 ASSAY OF MAGNESIUM: CPT

## 2018-10-09 PROCEDURE — 82306 VITAMIN D 25 HYDROXY: CPT

## 2018-10-09 PROCEDURE — 80048 BASIC METABOLIC PNL TOTAL CA: CPT

## 2018-10-09 PROCEDURE — 99214 OFFICE O/P EST MOD 30 MIN: CPT | Performed by: INTERNAL MEDICINE

## 2018-10-09 PROCEDURE — 36415 COLL VENOUS BLD VENIPUNCTURE: CPT

## 2018-10-09 PROCEDURE — 84100 ASSAY OF PHOSPHORUS: CPT

## 2018-10-09 NOTE — PROGRESS NOTES
Nephrology Progress Note      ASSESSMENT/PLAN:        1) CKD 3 / recurrent TANIA- due to volume depletion with modest PO intake + high output ostomy exac by obstructive uropathy due to distended rectosigmoid stump- had improved at recent hosp discharge.  Rech adenocarcinoma   • Renal disorder     ckd   • S/P LASIK (laser assisted in situ keratomileusis) 7/2/2014   • Sjogren's syndrome ([facility]    • Stroke ([facility]     tia   • Thalassemia minor    • THALLASEMIA    • Unspecified essential hypertension    • Unspecified 25.00        Pack years: 25        Types: Cigarettes        Quit date: 1975        Years since quittin.7      Smokeless tobacco: Never Used    Alcohol use: Yes      Alcohol/week: 0.0 oz      Comment: 1 glass of wine a month    Drug use:  No Comment:headache  Nsaids                      Comment:chrohs disease  Pcn [Bicillin L-A]      RASH  Sulfa Antibiotics       RASH    ROS:     Denies fever/chills  Denies wt loss/gain  Denies HA or visual changes  Denies CP or palpitations  Denies SOB/

## 2018-10-09 NOTE — TELEPHONE ENCOUNTER
D/w pt- renal function near baseline; to stop drinking OJ; continue sodium bicarb 1300 mg tid; to repeat labs in 2 weeks with Dr. Suri Mcduffie monthly CBC- thx tiffany

## 2018-10-10 NOTE — PROGRESS NOTES
Results reviewed and reveal vitamin D deficiency. Patient to take prescription dose of 50,000 IU vitamin D PO once weekly for 12 weeks, then transition to 2,000 IU vitamin D3 OTC daily.

## 2018-10-25 ENCOUNTER — OFFICE VISIT (OUTPATIENT)
Dept: HEMATOLOGY/ONCOLOGY | Age: 83
End: 2018-10-25
Attending: INTERNAL MEDICINE
Payer: MEDICARE

## 2018-10-25 VITALS
BODY MASS INDEX: 20 KG/M2 | DIASTOLIC BLOOD PRESSURE: 68 MMHG | WEIGHT: 117.19 LBS | HEART RATE: 92 BPM | TEMPERATURE: 96 F | OXYGEN SATURATION: 100 % | RESPIRATION RATE: 16 BRPM | SYSTOLIC BLOOD PRESSURE: 137 MMHG

## 2018-10-25 DIAGNOSIS — E83.39 HYPOPHOSPHATASIA: ICD-10-CM

## 2018-10-25 DIAGNOSIS — C20 RECTAL CANCER (HCC): Primary | ICD-10-CM

## 2018-10-25 DIAGNOSIS — N17.9 AKI (ACUTE KIDNEY INJURY) (HCC): ICD-10-CM

## 2018-10-25 DIAGNOSIS — E83.42 HYPOMAGNESEMIA: ICD-10-CM

## 2018-10-25 DIAGNOSIS — E86.0 DEHYDRATION: ICD-10-CM

## 2018-10-25 DIAGNOSIS — E87.5 HYPERKALEMIA: ICD-10-CM

## 2018-10-25 DIAGNOSIS — D50.0 IRON DEFICIENCY ANEMIA DUE TO CHRONIC BLOOD LOSS: Primary | ICD-10-CM

## 2018-10-25 DIAGNOSIS — D50.0 IRON DEFICIENCY ANEMIA DUE TO CHRONIC BLOOD LOSS: ICD-10-CM

## 2018-10-25 DIAGNOSIS — E87.2 METABOLIC ACIDOSIS: ICD-10-CM

## 2018-10-25 DIAGNOSIS — N18.30 CKD (CHRONIC KIDNEY DISEASE) STAGE 3, GFR 30-59 ML/MIN (HCC): ICD-10-CM

## 2018-10-25 PROCEDURE — 80053 COMPREHEN METABOLIC PANEL: CPT | Performed by: INTERNAL MEDICINE

## 2018-10-25 PROCEDURE — 85025 COMPLETE CBC W/AUTO DIFF WBC: CPT | Performed by: INTERNAL MEDICINE

## 2018-10-25 PROCEDURE — 83735 ASSAY OF MAGNESIUM: CPT | Performed by: INTERNAL MEDICINE

## 2018-10-25 PROCEDURE — 82728 ASSAY OF FERRITIN: CPT | Performed by: INTERNAL MEDICINE

## 2018-10-25 PROCEDURE — 36415 COLL VENOUS BLD VENIPUNCTURE: CPT

## 2018-10-25 PROCEDURE — 84100 ASSAY OF PHOSPHORUS: CPT | Performed by: INTERNAL MEDICINE

## 2018-10-25 PROCEDURE — 83615 LACTATE (LD) (LDH) ENZYME: CPT | Performed by: INTERNAL MEDICINE

## 2018-10-25 PROCEDURE — 83550 IRON BINDING TEST: CPT | Performed by: INTERNAL MEDICINE

## 2018-10-25 PROCEDURE — 96372 THER/PROPH/DIAG INJ SC/IM: CPT

## 2018-10-25 PROCEDURE — 83540 ASSAY OF IRON: CPT | Performed by: INTERNAL MEDICINE

## 2018-10-25 NOTE — PROGRESS NOTES
Dr. Monica Harrington aware of Mag 1.1. He will defer any changes to nephrology- per MD he will forward her labs to nephrology. Pt aware.  Raquel Najera RN

## 2018-10-25 NOTE — PROGRESS NOTES
Patient is here for MD f/u for rectal cancer. Patient has a permanent rectal drain that the home health nurse flushes 3 times weekly. Because there is no drainage, patient would like to decrease the frequency to twice weekly instead.  Home health nurse, Esther Zaldivar

## 2018-10-27 ENCOUNTER — MED REC SCAN ONLY (OUTPATIENT)
Dept: HEMATOLOGY/ONCOLOGY | Facility: HOSPITAL | Age: 83
End: 2018-10-27

## 2018-10-30 NOTE — PROGRESS NOTES
[facility]    PATIENT'S NAME: Dianne KinseyScott   ATTENDING PHYSICIAN: Hussein Sloan M.D.    PATIENT ACCOUNT #: [de-identified] LOCATION: [address] [place] [address] RECORD #: [id number] YOB: 1934   DATE OF SERVICE: 10/25/2018       CANCER and has gotten IV iron. She does not have much in the way of abdominal pain or pelvic pain. She is fairly comfortable. She has a home health nurse seeing her. She has been flushing the drain 3 times a week.   We are going to decrease this to twice a wee are 178. Her iron saturation is 41%. Her creatinine is stably elevated at 2.27. Her liver function tests are normal.    IMPRESSION:    1. Anemia. This is likely due to her chronic kidney disease. She is not iron deficient.   She may have some suppres

## 2018-11-05 ENCOUNTER — TELEPHONE (OUTPATIENT)
Dept: NEPHROLOGY | Facility: CLINIC | Age: 83
End: 2018-11-05

## 2018-11-05 NOTE — TELEPHONE ENCOUNTER
D/W pt- she will continue receiving EPO thru Dr. Tiago Masterson office- [facility] Haseeb Becker

## 2018-11-08 ENCOUNTER — OFFICE VISIT (OUTPATIENT)
Dept: HEMATOLOGY/ONCOLOGY | Age: 83
End: 2018-11-08
Attending: INTERNAL MEDICINE
Payer: MEDICARE

## 2018-11-08 DIAGNOSIS — D50.0 IRON DEFICIENCY ANEMIA DUE TO CHRONIC BLOOD LOSS: Primary | ICD-10-CM

## 2018-11-08 DIAGNOSIS — E86.0 DEHYDRATION: ICD-10-CM

## 2018-11-08 PROCEDURE — 85025 COMPLETE CBC W/AUTO DIFF WBC: CPT

## 2018-11-08 PROCEDURE — 96372 THER/PROPH/DIAG INJ SC/IM: CPT

## 2018-11-08 PROCEDURE — 36415 COLL VENOUS BLD VENIPUNCTURE: CPT

## 2018-11-11 ENCOUNTER — HOSPITAL ENCOUNTER (EMERGENCY)
Age: 83
Discharge: HOME | End: 2018-11-11
Attending: EMERGENCY MEDICINE
Payer: MEDICARE

## 2018-11-11 VITALS
HEART RATE: 96 BPM | DIASTOLIC BLOOD PRESSURE: 52 MMHG | SYSTOLIC BLOOD PRESSURE: 147 MMHG | OXYGEN SATURATION: 100 % | TEMPERATURE: 98 F | RESPIRATION RATE: 14 BRPM | WEIGHT: 110 LBS | BODY MASS INDEX: 18 KG/M2

## 2018-11-11 DIAGNOSIS — T85.9XXA COMPLICATION OF CATHETER, INITIAL ENCOUNTER: Primary | ICD-10-CM

## 2018-11-11 PROCEDURE — 99281 EMR DPT VST MAYX REQ PHY/QHP: CPT

## 2018-11-11 NOTE — ED INITIAL ASSESSMENT (HPI)
Pt has a drain from rectum she states has rectal cancer and has had drain in since June. Pt noticed overnight her tubing was not connecting to drainage bag and was leaking through clothing.  Pt denies any other concerns; she states she just needs a new dante

## 2018-11-11 NOTE — ED NOTES
Pt has old urinary leg bag in place connected to a drain from lt buttock. Pt reports that the drain tube will not stay connected to leg bag and also the drainage valve at the end of leg bag is leaking.   New urinary leg bag connected per MD. Dressing escalante

## 2018-11-11 NOTE — ED PROVIDER NOTES
Patient Seen in: [facility] In [place]    History   Patient presents with:  Cath Tube Problem (gastrointestinal, urinary, integumentary)    Stated Complaint: \"the nurse told me to come to the emergency department for the tubing comin 88%  CPAP 11 [employer]   • Visual impairment    • Vulvar cancer [facility])        Past Surgical History:   Procedure Laterality Date   • APPENDECTOMY     • APPENDECTOMY     • COLECTOMY     • COLONOSCOPY      6/09 no dysplasia.   Repeat 2014   • COLONOSCOPY  5/13/2 Resp 14   Wt 49.9 kg   SpO2 100%   BMI 18.30 kg/m²         Physical Exam  General: Nontoxic well-appearing 70-year-old female in no distress. Focused exam on the patient's drainage site: The tubing appears to be intact.   There is an area that appears

## 2018-11-12 RX ORDER — SODIUM BICARBONATE 650 MG/1
1300 TABLET ORAL 3 TIMES DAILY
Qty: 540 TABLET | Refills: 1 | Status: SHIPPED | OUTPATIENT
Start: 2018-11-12

## 2018-11-21 ENCOUNTER — OFFICE VISIT (OUTPATIENT)
Dept: HEMATOLOGY/ONCOLOGY | Age: 83
End: 2018-11-21
Attending: INTERNAL MEDICINE
Payer: MEDICARE

## 2018-11-21 DIAGNOSIS — D50.0 IRON DEFICIENCY ANEMIA DUE TO CHRONIC BLOOD LOSS: ICD-10-CM

## 2018-11-21 DIAGNOSIS — N17.9 AKI (ACUTE KIDNEY INJURY) (HCC): ICD-10-CM

## 2018-11-21 DIAGNOSIS — E87.2 METABOLIC ACIDOSIS: ICD-10-CM

## 2018-11-21 DIAGNOSIS — E83.39 HYPOPHOSPHATASIA: ICD-10-CM

## 2018-11-21 DIAGNOSIS — E86.0 DEHYDRATION: ICD-10-CM

## 2018-11-21 DIAGNOSIS — E87.5 HYPERKALEMIA: ICD-10-CM

## 2018-11-21 DIAGNOSIS — N18.30 CKD (CHRONIC KIDNEY DISEASE) STAGE 3, GFR 30-59 ML/MIN (HCC): Primary | ICD-10-CM

## 2018-11-21 DIAGNOSIS — E83.42 HYPOMAGNESEMIA: ICD-10-CM

## 2018-11-21 PROCEDURE — 80048 BASIC METABOLIC PNL TOTAL CA: CPT

## 2018-11-21 PROCEDURE — 85025 COMPLETE CBC W/AUTO DIFF WBC: CPT

## 2018-11-21 PROCEDURE — 84100 ASSAY OF PHOSPHORUS: CPT

## 2018-11-21 PROCEDURE — 36415 COLL VENOUS BLD VENIPUNCTURE: CPT

## 2018-11-21 PROCEDURE — 83735 ASSAY OF MAGNESIUM: CPT

## 2018-11-21 PROCEDURE — 96372 THER/PROPH/DIAG INJ SC/IM: CPT

## 2018-11-21 NOTE — PROGRESS NOTES
CHARLEY Schuler aware of Mag 1.1. Labs forwarded to Dr. Cristian Hayes, ordering physician, will defer any changes to nephrology. Pt aware. Copy provided to ptClinton Fuentes

## 2018-11-23 ENCOUNTER — TELEPHONE (OUTPATIENT)
Dept: SURGERY | Facility: CLINIC | Age: 83
End: 2018-11-23

## 2018-11-23 NOTE — TELEPHONE ENCOUNTER
Tommie Lacy called stating [facility] RN looking for update on appt for drain check. Donny Guerrero, ANOOP offered appt for Monday 11/26 for this matter. Asked if I could call pt to confirm. I called pt and scheduled above appt.   Pt appreciated the call and accepted

## 2018-11-28 ENCOUNTER — TELEPHONE (OUTPATIENT)
Dept: NEPHROLOGY | Facility: CLINIC | Age: 83
End: 2018-11-28

## 2018-11-29 NOTE — TELEPHONE ENCOUNTER
D/W pt- K remains borderline; magnesium stable on replacement (low but acceptable);  Cr stable- to repeat labs in 1 month- iris allen

## 2018-12-06 ENCOUNTER — OFFICE VISIT (OUTPATIENT)
Dept: HEMATOLOGY/ONCOLOGY | Age: 83
End: 2018-12-06
Attending: INTERNAL MEDICINE
Payer: MEDICARE

## 2018-12-06 VITALS
BODY MASS INDEX: 20 KG/M2 | TEMPERATURE: 96 F | WEIGHT: 117.19 LBS | DIASTOLIC BLOOD PRESSURE: 54 MMHG | SYSTOLIC BLOOD PRESSURE: 130 MMHG | HEART RATE: 79 BPM | OXYGEN SATURATION: 99 % | RESPIRATION RATE: 18 BRPM

## 2018-12-06 DIAGNOSIS — E83.39 HYPOPHOSPHATASIA: ICD-10-CM

## 2018-12-06 DIAGNOSIS — N18.4 ANEMIA IN STAGE 4 CHRONIC KIDNEY DISEASE (HCC): ICD-10-CM

## 2018-12-06 DIAGNOSIS — N18.30 CKD (CHRONIC KIDNEY DISEASE) STAGE 3, GFR 30-59 ML/MIN (HCC): ICD-10-CM

## 2018-12-06 DIAGNOSIS — D63.1 ANEMIA IN STAGE 4 CHRONIC KIDNEY DISEASE (HCC): ICD-10-CM

## 2018-12-06 DIAGNOSIS — E87.2 METABOLIC ACIDOSIS: ICD-10-CM

## 2018-12-06 DIAGNOSIS — D50.0 IRON DEFICIENCY ANEMIA DUE TO CHRONIC BLOOD LOSS: Primary | ICD-10-CM

## 2018-12-06 DIAGNOSIS — D50.0 IRON DEFICIENCY ANEMIA DUE TO CHRONIC BLOOD LOSS: ICD-10-CM

## 2018-12-06 DIAGNOSIS — E83.42 HYPOMAGNESEMIA: ICD-10-CM

## 2018-12-06 DIAGNOSIS — N17.9 AKI (ACUTE KIDNEY INJURY) (HCC): ICD-10-CM

## 2018-12-06 DIAGNOSIS — E86.0 DEHYDRATION: ICD-10-CM

## 2018-12-06 DIAGNOSIS — E87.5 HYPERKALEMIA: ICD-10-CM

## 2018-12-06 DIAGNOSIS — C20 RECTAL CANCER (HCC): Primary | ICD-10-CM

## 2018-12-06 PROCEDURE — 99214 OFFICE O/P EST MOD 30 MIN: CPT | Performed by: INTERNAL MEDICINE

## 2018-12-06 PROCEDURE — 96372 THER/PROPH/DIAG INJ SC/IM: CPT

## 2018-12-06 NOTE — PROGRESS NOTES
Patient is here for MD f/u for rectal cancer. Patient has a permanent rectal drain that the home health nurse has been flushing twice weekly. Patient has noticed increased dark colored drainage, patient unsure if its coming from rectum and vagina.   Eating

## 2018-12-13 ENCOUNTER — OFFICE VISIT (OUTPATIENT)
Dept: SURGERY | Facility: CLINIC | Age: 83
End: 2018-12-13
Payer: MEDICARE

## 2018-12-13 VITALS
DIASTOLIC BLOOD PRESSURE: 54 MMHG | WEIGHT: 117 LBS | TEMPERATURE: 98 F | HEIGHT: 65 IN | BODY MASS INDEX: 19.49 KG/M2 | OXYGEN SATURATION: 99 % | HEART RATE: 79 BPM | RESPIRATION RATE: 18 BRPM | SYSTOLIC BLOOD PRESSURE: 130 MMHG

## 2018-12-13 DIAGNOSIS — Z51.89 VISIT FOR WOUND CARE: Primary | ICD-10-CM

## 2018-12-13 PROCEDURE — 99211 OFF/OP EST MAY X REQ PHY/QHP: CPT | Performed by: PHYSICIAN ASSISTANT

## 2018-12-13 NOTE — PROGRESS NOTES
[facility] Surgical Oncology    Patient Name:  Lazarus Sloan   YOB: 1934   Gender:  Female   Appt Date:  12/13/2018   Provider:  KIRBY Echols   Insurance:  [address] [place] [address]  Referring Provider: Richy Mcknight stoma formation. Subsequently underwent completion colectomy with Ventura's pouch and small bowel resection with loop ileostomy in September 2014. 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   More recently she has been (325 mg total) by mouth every 4 (four) hours as needed. , Disp: 30 tablet, Rfl: 0  •  TraZODone HCl 50 MG Oral Tab, Take 0.5 tablets (25 mg total) by mouth nightly as needed for Sleep., Disp: 30 tablet, Rfl: 0  •  ALPRAZolam 1 MG Oral Tab, Take 1 tablet (1 Insomnia    • KIDNEY STONE    • Macular degeneration    • Migraines     as a child   • Osteoarthrosis, unspecified whether generalized or localized, unspecified site    • OSTEOPENIA    • Paget's disease     Vulva   • Peripheral vascular disease ([facility]    • • Cancer Brother         basal cell cancer on scalp and ear   • Other (angina[other]) Mother            • Heart Disorder Mother    • Diabetes Father    • Hypertension Father    • Hypertension Sister       Reviewed:  Obstetric History     T0

## 2018-12-14 NOTE — PROGRESS NOTES
[facility]    PATIENT'S NAME: Juve Martinezr, Scott BULLOCK   ATTENDING PHYSICIAN: Len Karimi M.D.    PATIENT ACCOUNT #: [de-identified] LOCATION: [address] [place] [address] RECORD #: [id number] YOB: 1934   DATE OF SERVICE: 12/06/2018       CANCER flushed twice weekly but the home health nurse. She has had some issues with chronic elevation in her potassium and it has been followed carefully by Dr. Brandon Quinteros.   She has been coming for her erythropoietin injections at 2 week intervals and she has been abl pelvis. Our efforts are palliative and she is at least remaining somewhat independent at present, but at some point she will decline and then will need 24-hour care.   We have talked about her code status in past.  I have encouraged her to consider Do Not

## 2018-12-20 ENCOUNTER — OFFICE VISIT (OUTPATIENT)
Dept: HEMATOLOGY/ONCOLOGY | Age: 83
End: 2018-12-20
Attending: INTERNAL MEDICINE
Payer: MEDICARE

## 2018-12-20 DIAGNOSIS — D50.0 IRON DEFICIENCY ANEMIA DUE TO CHRONIC BLOOD LOSS: Primary | ICD-10-CM

## 2018-12-20 DIAGNOSIS — E83.42 HYPOMAGNESEMIA: ICD-10-CM

## 2018-12-20 DIAGNOSIS — E87.5 HYPERKALEMIA: ICD-10-CM

## 2018-12-20 DIAGNOSIS — N18.30 CKD (CHRONIC KIDNEY DISEASE) STAGE 3, GFR 30-59 ML/MIN (HCC): ICD-10-CM

## 2018-12-20 DIAGNOSIS — E86.0 DEHYDRATION: ICD-10-CM

## 2018-12-20 DIAGNOSIS — E87.2 METABOLIC ACIDOSIS: ICD-10-CM

## 2018-12-20 DIAGNOSIS — E83.39 HYPOPHOSPHATASIA: ICD-10-CM

## 2018-12-20 DIAGNOSIS — N17.9 AKI (ACUTE KIDNEY INJURY) (HCC): ICD-10-CM

## 2018-12-20 PROCEDURE — 36415 COLL VENOUS BLD VENIPUNCTURE: CPT

## 2018-12-20 PROCEDURE — 96372 THER/PROPH/DIAG INJ SC/IM: CPT

## 2018-12-20 PROCEDURE — 80048 BASIC METABOLIC PNL TOTAL CA: CPT

## 2018-12-20 PROCEDURE — 83735 ASSAY OF MAGNESIUM: CPT

## 2018-12-20 PROCEDURE — 85025 COMPLETE CBC W/AUTO DIFF WBC: CPT

## 2018-12-20 PROCEDURE — 84100 ASSAY OF PHOSPHORUS: CPT

## 2018-12-23 ENCOUNTER — TELEPHONE (OUTPATIENT)
Dept: NEPHROLOGY | Facility: CLINIC | Age: 83
End: 2018-12-23

## 2018-12-27 ENCOUNTER — HOSPITAL ENCOUNTER (INPATIENT)
Facility: HOSPITAL | Age: 83
LOS: 3 days | Discharge: NURSING, REHAB OR LONG-TERM CARE | DRG: 683 | End: 2018-12-30
Attending: EMERGENCY MEDICINE | Admitting: INTERNAL MEDICINE
Payer: MEDICARE

## 2018-12-27 ENCOUNTER — APPOINTMENT (OUTPATIENT)
Dept: GENERAL RADIOLOGY | Age: 83
DRG: 683 | End: 2018-12-27
Attending: EMERGENCY MEDICINE
Payer: MEDICARE

## 2018-12-27 ENCOUNTER — APPOINTMENT (OUTPATIENT)
Dept: CT IMAGING | Facility: HOSPITAL | Age: 83
DRG: 683 | End: 2018-12-27
Attending: INTERNAL MEDICINE
Payer: MEDICARE

## 2018-12-27 DIAGNOSIS — M89.9 BONE LESION: ICD-10-CM

## 2018-12-27 DIAGNOSIS — H26.9 CATARACT OF RIGHT EYE, UNSPECIFIED CATARACT TYPE: ICD-10-CM

## 2018-12-27 DIAGNOSIS — N18.9 CHRONIC KIDNEY DISEASE, UNSPECIFIED CKD STAGE: ICD-10-CM

## 2018-12-27 DIAGNOSIS — M25.551 HIP PAIN, ACUTE, RIGHT: Primary | ICD-10-CM

## 2018-12-27 PROBLEM — E87.2 METABOLIC ACIDOSIS: Status: ACTIVE | Noted: 2018-12-27

## 2018-12-27 PROBLEM — N17.9 ACUTE RENAL FAILURE (ARF) (HCC): Status: ACTIVE | Noted: 2018-12-27

## 2018-12-27 PROBLEM — E87.20 METABOLIC ACIDOSIS: Status: ACTIVE | Noted: 2018-12-27

## 2018-12-27 PROBLEM — D64.9 ANEMIA: Status: ACTIVE | Noted: 2018-12-27

## 2018-12-27 PROBLEM — N17.9 ACUTE KIDNEY INJURY (HCC): Status: ACTIVE | Noted: 2018-12-27

## 2018-12-27 LAB
ALBUMIN SERPL-MCNC: 3.5 G/DL (ref 3.1–4.5)
ALBUMIN/GLOB SERPL: 1 {RATIO} (ref 1–2)
ALP LIVER SERPL-CCNC: 97 U/L (ref 55–142)
ALT SERPL-CCNC: 18 U/L (ref 14–54)
ANION GAP SERPL CALC-SCNC: 10 MMOL/L (ref 0–18)
AST SERPL-CCNC: 14 U/L (ref 15–41)
BASOPHILS # BLD AUTO: 0.05 X10(3) UL (ref 0–0.1)
BASOPHILS NFR BLD AUTO: 0.6 %
BILIRUB SERPL-MCNC: 0.9 MG/DL (ref 0.1–2)
BUN BLD-MCNC: 53 MG/DL (ref 8–20)
BUN/CREAT SERPL: 19.3 (ref 10–20)
CALCIUM BLD-MCNC: 10.6 MG/DL (ref 8.3–10.3)
CEA SERPL-MCNC: 2.3 NG/ML (ref 0.5–5)
CHLORIDE SERPL-SCNC: 115 MMOL/L (ref 101–111)
CO2 SERPL-SCNC: 16 MMOL/L (ref 22–32)
CREAT BLD-MCNC: 2.74 MG/DL (ref 0.55–1.02)
EOSINOPHIL # BLD AUTO: 0.1 X10(3) UL (ref 0–0.3)
EOSINOPHIL NFR BLD AUTO: 1.1 %
ERYTHROCYTE [DISTWIDTH] IN BLOOD BY AUTOMATED COUNT: 21.4 % (ref 11.5–16)
GLOBULIN PLAS-MCNC: 3.5 G/DL (ref 2.8–4.4)
GLUCOSE BLD-MCNC: 98 MG/DL (ref 70–99)
HCT VFR BLD AUTO: 29.9 % (ref 34–50)
HGB BLD-MCNC: 9.2 G/DL (ref 12–16)
IMMATURE GRANULOCYTE COUNT: 0.08 X10(3) UL (ref 0–1)
IMMATURE GRANULOCYTE RATIO %: 0.9 %
LYMPHOCYTES # BLD AUTO: 0.7 X10(3) UL (ref 0.9–4)
LYMPHOCYTES NFR BLD AUTO: 7.9 %
M PROTEIN MFR SERPL ELPH: 7 G/DL (ref 6.4–8.2)
MCH RBC QN AUTO: 22.2 PG (ref 27–33.2)
MCHC RBC AUTO-ENTMCNC: 30.8 G/DL (ref 31–37)
MCV RBC AUTO: 72.2 FL (ref 81–100)
MONOCYTES # BLD AUTO: 0.72 X10(3) UL (ref 0.1–1)
MONOCYTES NFR BLD AUTO: 8.1 %
NEUTROPHIL ABS PRELIM: 7.22 X10 (3) UL (ref 1.3–6.7)
NEUTROPHILS # BLD AUTO: 7.22 X10(3) UL (ref 1.3–6.7)
NEUTROPHILS NFR BLD AUTO: 81.4 %
OSMOLALITY SERPL CALC.SUM OF ELEC: 306 MOSM/KG (ref 275–295)
PLATELET # BLD AUTO: 234 10(3)UL (ref 150–450)
POTASSIUM SERPL-SCNC: 5.1 MMOL/L (ref 3.6–5.1)
RBC # BLD AUTO: 4.14 X10(6)UL (ref 3.8–5.1)
RED CELL DISTRIBUTION WIDTH-SD: 49.9 FL (ref 35.1–46.3)
SODIUM SERPL-SCNC: 141 MMOL/L (ref 136–144)
WBC # BLD AUTO: 8.9 X10(3) UL (ref 4–13)

## 2018-12-27 PROCEDURE — 73502 X-RAY EXAM HIP UNI 2-3 VIEWS: CPT | Performed by: EMERGENCY MEDICINE

## 2018-12-27 PROCEDURE — 99223 1ST HOSP IP/OBS HIGH 75: CPT | Performed by: INTERNAL MEDICINE

## 2018-12-27 PROCEDURE — 74176 CT ABD & PELVIS W/O CONTRAST: CPT | Performed by: INTERNAL MEDICINE

## 2018-12-27 RX ORDER — SODIUM BICARBONATE 325 MG/1
1300 TABLET ORAL 3 TIMES DAILY
Status: DISCONTINUED | OUTPATIENT
Start: 2018-12-27 | End: 2018-12-30

## 2018-12-27 RX ORDER — BISACODYL 10 MG
10 SUPPOSITORY, RECTAL RECTAL
Status: DISCONTINUED | OUTPATIENT
Start: 2018-12-27 | End: 2018-12-30

## 2018-12-27 RX ORDER — HYDROMORPHONE HYDROCHLORIDE 1 MG/ML
0.5 INJECTION, SOLUTION INTRAMUSCULAR; INTRAVENOUS; SUBCUTANEOUS EVERY 30 MIN PRN
Status: DISCONTINUED | OUTPATIENT
Start: 2018-12-27 | End: 2018-12-27

## 2018-12-27 RX ORDER — DIPHENOXYLATE HYDROCHLORIDE AND ATROPINE SULFATE 2.5; .025 MG/1; MG/1
1-2 TABLET ORAL 4 TIMES DAILY PRN
Status: DISCONTINUED | OUTPATIENT
Start: 2018-12-27 | End: 2018-12-30

## 2018-12-27 RX ORDER — POLYETHYLENE GLYCOL 3350 17 G/17G
17 POWDER, FOR SOLUTION ORAL DAILY PRN
Status: DISCONTINUED | OUTPATIENT
Start: 2018-12-27 | End: 2018-12-30

## 2018-12-27 RX ORDER — MORPHINE SULFATE 4 MG/ML
1 INJECTION, SOLUTION INTRAMUSCULAR; INTRAVENOUS EVERY 2 HOUR PRN
Status: DISCONTINUED | OUTPATIENT
Start: 2018-12-27 | End: 2018-12-30

## 2018-12-27 RX ORDER — MAGNESIUM OXIDE 400 MG (241.3 MG MAGNESIUM) TABLET
3 TABLET NIGHTLY PRN
Status: DISCONTINUED | OUTPATIENT
Start: 2018-12-27 | End: 2018-12-30

## 2018-12-27 RX ORDER — HEPARIN SODIUM 5000 [USP'U]/ML
5000 INJECTION, SOLUTION INTRAVENOUS; SUBCUTANEOUS EVERY 8 HOURS SCHEDULED
Status: DISCONTINUED | OUTPATIENT
Start: 2018-12-27 | End: 2018-12-27

## 2018-12-27 RX ORDER — ONDANSETRON 2 MG/ML
4 INJECTION INTRAMUSCULAR; INTRAVENOUS EVERY 4 HOURS PRN
Status: DISCONTINUED | OUTPATIENT
Start: 2018-12-27 | End: 2018-12-27

## 2018-12-27 RX ORDER — MELATONIN
3 EVERY EVENING
Status: DISCONTINUED | OUTPATIENT
Start: 2018-12-27 | End: 2018-12-27

## 2018-12-27 RX ORDER — HYDROMORPHONE HYDROCHLORIDE 1 MG/ML
0.5 INJECTION, SOLUTION INTRAMUSCULAR; INTRAVENOUS; SUBCUTANEOUS EVERY 30 MIN PRN
Status: DISCONTINUED | OUTPATIENT
Start: 2018-12-27 | End: 2018-12-30

## 2018-12-27 RX ORDER — ACETAMINOPHEN 325 MG/1
325 TABLET ORAL EVERY 4 HOURS PRN
Status: DISCONTINUED | OUTPATIENT
Start: 2018-12-27 | End: 2018-12-29

## 2018-12-27 RX ORDER — FOLIC ACID 1 MG/1
1 TABLET ORAL DAILY
Status: DISCONTINUED | OUTPATIENT
Start: 2018-12-27 | End: 2018-12-30

## 2018-12-27 RX ORDER — MORPHINE SULFATE 4 MG/ML
2 INJECTION, SOLUTION INTRAMUSCULAR; INTRAVENOUS EVERY 2 HOUR PRN
Status: DISCONTINUED | OUTPATIENT
Start: 2018-12-27 | End: 2018-12-30

## 2018-12-27 RX ORDER — HEPARIN SODIUM 5000 [USP'U]/ML
5000 INJECTION, SOLUTION INTRAVENOUS; SUBCUTANEOUS EVERY 8 HOURS SCHEDULED
Status: DISCONTINUED | OUTPATIENT
Start: 2018-12-27 | End: 2018-12-30

## 2018-12-27 RX ORDER — TRAZODONE HYDROCHLORIDE 50 MG/1
25 TABLET ORAL NIGHTLY PRN
Status: DISCONTINUED | OUTPATIENT
Start: 2018-12-27 | End: 2018-12-30

## 2018-12-27 RX ORDER — ECHINACEA PURPUREA EXTRACT 125 MG
1 TABLET ORAL
Status: DISCONTINUED | OUTPATIENT
Start: 2018-12-27 | End: 2018-12-30

## 2018-12-27 RX ORDER — VITS A,C,E/LUTEIN/MINERALS 300MCG-200
1 TABLET ORAL DAILY
Status: DISCONTINUED | OUTPATIENT
Start: 2018-12-27 | End: 2018-12-30

## 2018-12-27 RX ORDER — METOCLOPRAMIDE HYDROCHLORIDE 5 MG/ML
5 INJECTION INTRAMUSCULAR; INTRAVENOUS EVERY 8 HOURS PRN
Status: DISCONTINUED | OUTPATIENT
Start: 2018-12-27 | End: 2018-12-30

## 2018-12-27 RX ORDER — ALPRAZOLAM 0.5 MG/1
0.5 TABLET ORAL 2 TIMES DAILY PRN
Status: DISCONTINUED | OUTPATIENT
Start: 2018-12-27 | End: 2018-12-30

## 2018-12-27 RX ORDER — ONDANSETRON 2 MG/ML
4 INJECTION INTRAMUSCULAR; INTRAVENOUS EVERY 6 HOURS PRN
Status: DISCONTINUED | OUTPATIENT
Start: 2018-12-27 | End: 2018-12-30

## 2018-12-27 RX ORDER — DOCUSATE SODIUM 100 MG/1
100 CAPSULE, LIQUID FILLED ORAL 2 TIMES DAILY
Status: DISCONTINUED | OUTPATIENT
Start: 2018-12-27 | End: 2018-12-30

## 2018-12-27 RX ORDER — MORPHINE SULFATE 4 MG/ML
4 INJECTION, SOLUTION INTRAMUSCULAR; INTRAVENOUS EVERY 2 HOUR PRN
Status: DISCONTINUED | OUTPATIENT
Start: 2018-12-27 | End: 2018-12-30

## 2018-12-27 RX ORDER — HYDROCODONE BITARTRATE AND ACETAMINOPHEN 5; 325 MG/1; MG/1
1 TABLET ORAL EVERY 6 HOURS PRN
Status: DISCONTINUED | OUTPATIENT
Start: 2018-12-27 | End: 2018-12-30

## 2018-12-27 NOTE — CONSULTS
Hem/Onc Report of Consultation    Patient Name: James Henriquez   YOB: 1934   Medical Record Number: [id number]   CSN: [id number]   Consulting Physician: Dr. Rosangela Franco  Referring Physician(s): Dr. Lakeshia Maynard  Date of Consultation: 12/2 past year. Usually one or two extra strength tylenol help any hip pain she has, she does not take any other pain medications.      She also reports increased tan colored drainage from her pelvis, but she is not sure if it is coming from her rectum or her va MD LUPE at [facility] ENDOSCOPY   • D & C     • EXPLORATORY LAPAROTOMY N/A 9/26/2014    Performed by Yolanda Brewer MD at [address] [place] [address] N/A 8/23/2018    Performed by Tamiko Macias MD at [place] [address] N/A 8/21/2018 Alcohol use:  Yes        Alcohol/week: 0.0 oz        Comment: 1 glass of wine a month      Drug use: No      Sexual activity: Yes        Partners: Male    Other Topics      Concerns:        Caffeine Concern: Not Asked        Exercise: Not Asked        Seat medications on file. Home Medications:    No current outpatient medications on file. Review of Systems:  A comprehensive 14 point review of systems was completed. Pertinent positives and negatives noted in the the HPI.     Allergies:  Ativan [Lorazep BUN/CREA Ratio 19.3 10.0 - 20.0    Calcium, Total 10.6 (H) 8.3 - 10.3 mg/dL    Calculated Osmolality 306 (H) 275 - 295 mOsm/kg    GFR, Non- 15 (L) >=60    GFR, -American 18 (L) >=60    AST 14 (L) 15 - 41 U/L    Alt 18 14 - 54 U/L pain     PATIENT STATED HISTORY: (As transcribed by Technologist)  Pain lateral portion of right hip.  Patient denies any injury or trauma         FINDINGS:    Mixed sclerotic and lucent appearance of the right pubic bone involving the central medial aspect

## 2018-12-27 NOTE — ED INITIAL ASSESSMENT (HPI)
Pt, who sts she's broken her right hip twice in the past, was doing housework yesterday and thinks she might've overdone it, causing her right hip to become very sore. She denies falls or trauma. She sts she's been taking Tylenol for the pain at home.

## 2018-12-27 NOTE — CONSULTS
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     Allergies:    Ativan [Lorazepam]      CONFUSION    Comment:Increased anxiety and confusion  Aspirin Cr [Aspirin]    BLEEDING  Ciprofloxacin               Comment:Itching  Flagyl [Metronidazo*        Comment:Itching  [facility] injection 300 mcg, 300 mcg, Subcutaneous, Q30 Days    No current outpatient medications on file. Review of Systems:  Please see HPI for pertinent positives. 10 point review of systems otherwise reviewed and negative.      Physical Exam:  /65 (BP Lo

## 2018-12-27 NOTE — PROGRESS NOTES
NURSING ADMISSION NOTE      Patient admitted via Ambulance  Oriented to room. Safety precautions initiated. Bed in low position. Call light in reach. Pt AOx4. VSS. States pain is under control since she was given dilaudid at  ER.  Admission na

## 2018-12-27 NOTE — ED PROVIDER NOTES
Patient Seen in: [address] [facility] In [place]    History   Patient presents with:  Lower Extremity Injury (musculoskeletal)    Stated Complaint: right hip pain    HPI    Patient is an 80-year-old female who has history of previous hip fractu 8-27-13    AHI 52/ supine 101/ sao2 88%  CPAP 11 [employer]   • Visual impairment    • Vulvar cancer [facility])        Past Surgical History:   Procedure Laterality Date   • APPENDECTOMY     • APPENDECTOMY     • COLECTOMY     • COLONOSCOPY      6/09 no dysplasia. comfortably on the cart in no acute distress. HEENT: Extraocular muscles intact  LUNGS: Lungs clear to auscultation bilaterally. CARDIOVASCULAR: + S1-S2, regular rate and rhythm, no murmurs. BACK: No CVA tenderness, no midline bony tenderness.   ABDOMEN: orders.    RAINBOW DRAW BLUE   RAINBOW DRAW GOLD          Right hip x-rayCONCLUSION:  Extensive abnormality involving the right pubis.  The patient previously had fracture of the superior and inferior pubic ramus, demonstrated on a previous CT scan. [facility]

## 2018-12-27 NOTE — CM/SW NOTE
12/27/18 1000   CM/SW Referral Data   Referral Source Social Work (self-referral)   Reason for Referral Discharge planning   Informant Patient   Patient Info   Patient's Mental Status Alert;Oriented   Patient's [address]   Discharge Needs

## 2018-12-27 NOTE — PROGRESS NOTES
12/27/18 1236   Clinical Encounter Type   Referral From Nurse   Referral To ([facility] as per the consult)   Episcopal Encounters   Spiritual Requests During Visit / Hospitalization [facility]

## 2018-12-27 NOTE — CONSULTS
Hem/Onc Report of Consultation    Patient Name: Kathleen Pastor   YOB: 1934   Medical Record Number: [id number]   CSN: [id number]   Consulting Physician: Dr. Nicole Mejía  Referring Physician(s): Dr. Rogelio Edwards  Date of Consultation: 12/2 past year. Usually one or two extra strength tylenol help any hip pain she has, she does not take any other pain medications.      She also reports increased tan colored drainage from her pelvis, but she is not sure if it is coming from her rectum or her va MD LUPE at [facility] ENDOSCOPY   • D & C     • EXPLORATORY LAPAROTOMY N/A 9/26/2014    Performed by Adina Duque MD at [address] [place] [address] N/A 8/23/2018    Performed by Henna Salas MD at [place] [address] N/A 8/21/2018 Alcohol use:  Yes        Alcohol/week: 0.0 oz        Comment: 1 glass of wine a month      Drug use: No      Sexual activity: Yes        Partners: Male    Other Topics      Concerns:        Caffeine Concern: Not Asked        Exercise: Not Asked        Seat medications on file. Home Medications:    No current outpatient medications on file. Review of Systems:  A comprehensive 14 point review of systems was completed. Pertinent positives and negatives noted in the the HPI.     Allergies:  Ativan [Lorazep BUN/CREA Ratio 19.3 10.0 - 20.0    Calcium, Total 10.6 (H) 8.3 - 10.3 mg/dL    Calculated Osmolality 306 (H) 275 - 295 mOsm/kg    GFR, Non- 15 (L) >=60    GFR, -American 18 (L) >=60    AST 14 (L) 15 - 41 U/L    Alt 18 14 - 54 U/L pain     PATIENT STATED HISTORY: (As transcribed by Technologist)  Pain lateral portion of right hip.  Patient denies any injury or trauma         FINDINGS:    Mixed sclerotic and lucent appearance of the right pubic bone involving the central medial aspect received palliative RT. She said she tolerated chemotherapy poorly in the past and is not interested in receiving this. Presented with acute onset right hip pain with same pain she has had with her indwelling drain. Her renal function has worsened.  Pain li

## 2018-12-27 NOTE — H&P
[facility] Hospitalist History and Physical      CC: Right hip pain    PCP: Arlyn Dow MD    History of Present Illness: Patient is a 80year old female with PMH sig for pelvic mucinous adenocarcinoma s/p chemo/RT presenting with right hip pain.  Has been bothe Visual impairment    • Vulvar cancer (HCC)         Medications:  Outpatient Meds:    Current Facility-Administered Medications on File Prior to Encounter:  Darbepoetin Orlando (ARANESP) 300 MCG/0.6ML injection 300 mcg 300 mcg Subcutaneous Q30 Days Madeline Villanueva • sodium bicarbonate  1,300 mg Oral TID   • Heparin Sodium (Porcine)  5,000 Units Subcutaneous Q8H [facility]   • docusate sodium  100 mg Oral BID   • OCUVITE-LUTEIN  1 tablet Oral Daily     Continuous Infusions:   • sodium bicarbonate infusion 150 mEq (12/27/1 AAO x 3, with appropriate affect      Data Review:    Labs:   Lab Results   Component Value Date    WBC 8.9 12/27/2018    HGB 9.2 12/27/2018    HCT 29.9 12/27/2018    .0 12/27/2018    CREATSERUM 2.74 12/27/2018    BUN 53 12/27/2018     12/27/2 reviewed.    [facility] hospitalist to continue to follow patient while in house     Media MD Isa  [facility] Hospitalist  Pager: [phone]    **Certification      PHYSICIAN Certification of Need for Inpatient Hospitalization - Initial Certification    Patient w

## 2018-12-27 NOTE — PROGRESS NOTES
[facility] Pharmacy Note:  Renal Dose Adjustment for Metoclopramide (REGLAN)    Daysi Shearer has been prescribed Metoclopramide (REGLAN) 10 mg every 8 hours as needed for nausea. .    Estimated Creatinine Clearance: 12.5 mL/min (A) (based on SCr of 2.74 mg/

## 2018-12-28 LAB
ANION GAP SERPL CALC-SCNC: 5 MMOL/L (ref 0–18)
BASOPHILS # BLD AUTO: 0.02 X10(3) UL (ref 0–0.1)
BASOPHILS NFR BLD AUTO: 0.2 %
BUN BLD-MCNC: 42 MG/DL (ref 8–20)
BUN/CREAT SERPL: 16.9 (ref 10–20)
CALCIUM BLD-MCNC: 9.6 MG/DL (ref 8.3–10.3)
CHLORIDE SERPL-SCNC: 111 MMOL/L (ref 101–111)
CO2 SERPL-SCNC: 25 MMOL/L (ref 22–32)
CREAT BLD-MCNC: 2.49 MG/DL (ref 0.55–1.02)
EOSINOPHIL # BLD AUTO: 0.11 X10(3) UL (ref 0–0.3)
EOSINOPHIL NFR BLD AUTO: 1.2 %
ERYTHROCYTE [DISTWIDTH] IN BLOOD BY AUTOMATED COUNT: 21.1 % (ref 11.5–16)
GLUCOSE BLD-MCNC: 104 MG/DL (ref 70–99)
HAV IGM SER QL: 1 MG/DL (ref 1.8–2.5)
HCT VFR BLD AUTO: 26.4 % (ref 34–50)
HGB BLD-MCNC: 8.7 G/DL (ref 12–16)
IMMATURE GRANULOCYTE COUNT: 0.1 X10(3) UL (ref 0–1)
IMMATURE GRANULOCYTE RATIO %: 1.1 %
LYMPHOCYTES # BLD AUTO: 0.47 X10(3) UL (ref 0.9–4)
LYMPHOCYTES NFR BLD AUTO: 5.2 %
MCH RBC QN AUTO: 23.4 PG (ref 27–33.2)
MCHC RBC AUTO-ENTMCNC: 33 G/DL (ref 31–37)
MCV RBC AUTO: 71 FL (ref 81–100)
MONOCYTES # BLD AUTO: 0.65 X10(3) UL (ref 0.1–1)
MONOCYTES NFR BLD AUTO: 7.2 %
NEUTROPHIL ABS PRELIM: 7.68 X10 (3) UL (ref 1.3–6.7)
NEUTROPHILS # BLD AUTO: 7.68 X10(3) UL (ref 1.3–6.7)
NEUTROPHILS NFR BLD AUTO: 85.1 %
OSMOLALITY SERPL CALC.SUM OF ELEC: 303 MOSM/KG (ref 275–295)
PHOSPHATE SERPL-MCNC: 3.6 MG/DL (ref 2.5–4.9)
PLATELET # BLD AUTO: 226 10(3)UL (ref 150–450)
POTASSIUM SERPL-SCNC: 4.4 MMOL/L (ref 3.6–5.1)
RBC # BLD AUTO: 3.72 X10(6)UL (ref 3.8–5.1)
RED CELL DISTRIBUTION WIDTH-SD: 48 FL (ref 35.1–46.3)
SODIUM SERPL-SCNC: 141 MMOL/L (ref 136–144)
WBC # BLD AUTO: 9 X10(3) UL (ref 4–13)

## 2018-12-28 PROCEDURE — 99233 SBSQ HOSP IP/OBS HIGH 50: CPT | Performed by: INTERNAL MEDICINE

## 2018-12-28 PROCEDURE — 99232 SBSQ HOSP IP/OBS MODERATE 35: CPT | Performed by: INTERNAL MEDICINE

## 2018-12-28 RX ORDER — TOBRAMYCIN 3 MG/ML
1 SOLUTION/ DROPS OPHTHALMIC
Status: DISCONTINUED | OUTPATIENT
Start: 2018-12-28 | End: 2018-12-30

## 2018-12-28 RX ORDER — TRAMADOL HYDROCHLORIDE 50 MG/1
50 TABLET ORAL EVERY 6 HOURS PRN
Status: DISCONTINUED | OUTPATIENT
Start: 2018-12-28 | End: 2018-12-28

## 2018-12-28 RX ORDER — MAGNESIUM OXIDE 400 MG (241.3 MG MAGNESIUM) TABLET
800 TABLET ONCE
Status: DISCONTINUED | OUTPATIENT
Start: 2018-12-28 | End: 2018-12-28

## 2018-12-28 RX ORDER — TOBRAMYCIN 3 MG/ML
SOLUTION/ DROPS OPHTHALMIC SEE ADMIN INSTRUCTIONS
COMMUNITY
End: 2019-01-01

## 2018-12-28 RX ORDER — PREDNISOLONE ACETATE 10 MG/ML
SUSPENSION/ DROPS OPHTHALMIC SEE ADMIN INSTRUCTIONS
COMMUNITY
End: 2019-01-01

## 2018-12-28 RX ORDER — PREDNISOLONE ACETATE 10 MG/ML
2 SUSPENSION/ DROPS OPHTHALMIC DAILY
Status: DISCONTINUED | OUTPATIENT
Start: 2018-12-28 | End: 2018-12-30

## 2018-12-28 RX ORDER — MAGNESIUM SULFATE HEPTAHYDRATE 40 MG/ML
2 INJECTION, SOLUTION INTRAVENOUS ONCE
Status: COMPLETED | OUTPATIENT
Start: 2018-12-28 | End: 2018-12-28

## 2018-12-28 RX ORDER — TRAMADOL HYDROCHLORIDE 50 MG/1
50 TABLET ORAL EVERY 12 HOURS PRN
Status: DISCONTINUED | OUTPATIENT
Start: 2018-12-28 | End: 2018-12-28

## 2018-12-28 RX ORDER — TRAMADOL HYDROCHLORIDE 50 MG/1
50 TABLET ORAL EVERY 12 HOURS PRN
Status: DISCONTINUED | OUTPATIENT
Start: 2018-12-28 | End: 2018-12-30

## 2018-12-28 RX ORDER — PREDNISOLONE ACETATE 10 MG/ML
1 SUSPENSION/ DROPS OPHTHALMIC SEE ADMIN INSTRUCTIONS
Status: DISCONTINUED | OUTPATIENT
Start: 2018-12-28 | End: 2018-12-28

## 2018-12-28 NOTE — HOME CARE LIAISON
PTNT CURRENT WITH [facility] SN/PT EOE 1/9/19 WILL NEED ATUL ON OR BEFORE DC    THANKS  Margaret Lauren

## 2018-12-28 NOTE — PHYSICAL THERAPY NOTE
PT order placed by RN, chart reviewed. D/w pt's RN this date. Current orders for NWB of RLE pending ortho consult. Pt has been demonstrating NWB pivot transfers with nsg staff at this time.   Will hold initiation of PT eval until ortho consult to ensure

## 2018-12-28 NOTE — PROGRESS NOTES
Heme/Onc Progress Note    Patient Name: James Henriquez   YOB: 1934   Medical Record Number: [id number]   CSN: [id number]   Attending Physician: Leatha Galicia M.D. Subjective:  Gets up with assist and using commode.   Hasn't been in rectal suppository 10 mg, 10 mg, Rectal, Daily PRN  •  ondansetron HCl (ZOFRAN) injection 4 mg, 4 mg, Intravenous, Q6H PRN  •  Metoclopramide HCl (REGLAN) injection 5 mg, 5 mg, Intravenous, Q8H PRN  •  sodium bicarbonate 150 mEq in dextrose 5 % 1,000 mL in >=60   MAGNESIUM    Collection Time: 12/28/18  5:57 AM   Result Value Ref Range    Magnesium 1.0 (LL) 1.8 - 2.5 mg/dL   PHOSPHORUS    Collection Time: 12/28/18  5:57 AM   Result Value Ref Range    Phosphorus 3.6 2.5 - 4.9 mg/dL   CBC W/ DIFFERENTIAL    Col PANCREAS:  No lesion, fluid collection, ductal dilatation, or atrophy. SPLEEN:  No enlargement or focal lesion. KIDNEYS:  No mass, obstruction, or calcification. ADRENALS:  No mass or enlargement. AORTA/VASCULAR:    No aneurysm.   Moderate vas quadrant colostomy is unremarkable. 3.  Complex solid and cystic fluid collection in the right lateral pelvis which measures 7.3 x 3.5 x 6 cm is not significantly changed most likely representing neoplasm in post treatment/postsurgical changes.   4.  There

## 2018-12-28 NOTE — PROGRESS NOTES
12/28/18 1317   Clinical Encounter Type   Visited With Patient   Continue Visiting No  ( remains available at pager #[phone] as needed/requested.)   Anabaptism Encounters   Anabaptism Needs Prayer  ( prayed for patient upon her request.)   Pa

## 2018-12-28 NOTE — PROGRESS NOTES
[facility]  Nephrology Progress Note    Bossman Vasquez Attending: Anna Marie Chong MD       Assessment and Plan:    1) TANIA- due to volume depletion with modest PO intake + significant ostomy losses; better than on previous admits.  No other i Component Value Date    WBC 9.0 12/28/2018    HGB 8.7 12/28/2018    HCT 26.4 12/28/2018    .0 12/28/2018    CREATSERUM 2.49 12/28/2018    BUN 42 12/28/2018     12/28/2018    K 4.4 12/28/2018     12/28/2018    CO2 25.0 12/28/2018    GLU - EYE VITAMIN) tab 1 tablet 1 tablet Oral Daily   melatonin tab TABS 3 mg 3 mg Oral Nightly PRN     Facility-Administered Medications Ordered in Other Encounters:  Darbepoetin [place] (ARANESP) 300 MCG/0.6ML injection 300 mcg 300 mcg Subcutaneous Q30 Days

## 2018-12-28 NOTE — PLAN OF CARE
MUSCULOSKELETAL - ADULT    • Return mobility to safest level of function Progressing        PAIN - ADULT    • Verbalizes/displays adequate comfort level or patient's stated pain goal Progressing        SAFETY ADULT - FALL    • Free from fall injury Progres

## 2018-12-28 NOTE — PROGRESS NOTES
[facility] Hospitalist Progress Note                                                                   [address]  7/28/1934    CC: FU right hip pain    Interval History:  - Feels better t NEPRELIM  7.22*  7.68*   WBC  8.9  9.0   PLT  234.0  226.0     Recent Labs   Lab  12/27/18   0717  12/28/18   0557   GLU  98  104*   BUN  53*  42*   CREATSERUM  2.74*  2.49*   GFRAA  18*  20*   GFRNAA  15*  17*   CA  10.6*  9.6   NA  141  141   K  5.1  4

## 2018-12-28 NOTE — PROGRESS NOTES
Hem/Onc Inpatient  Note    Patient Name: Rom Lundberg   YOB: 1934   Medical Record Number: [id number]   [facility]: [id number]      Chief Complaint: Right hip pain    S: Patient anxious to leave hospital. Reports she is still in pain at times sounds. Neurological: Grossly intact. Psych/Depression: mood and affect appropriate     Labs:  Results for Lester Bueno (MRN [id number]) as of 12/28/2018 15:17   Ref.  Range 12/28/2018 05:57   Glucose Latest Ref Range: 70 - 99 mg/dL 104 (H)   Sodi Immature Granulocyte Absolute Latest Ref Range: 0.00 - 1.00 x10(3) uL 0.10   Neutrophils % Latest Units: % 85.1   Lymphocytes % Latest Units: % 5.2   Monocytes % Latest Units: % 7.2   Eosinophils % Latest Units: % 1.2   Basophils % Latest Units: % 0.2 pathologic malignant involvement of the bone is raised.           Dictated by: Kia Howard MD on 12/27/2018 at 7:01       Approved by: Kia Howard MD   PROCEDURE:  CT ABDOMEN+PELVIS (KSB=[id number])     COMPARISON:  JAMIL CT ABDOMEN+PELVIS(AGJ=56803), measures approximately 7.3 x 3.5 cm not significantly changed   most likely representing tumor in post treatment changes. BONES:  Status post left hip surgery with typical artifacts.   Old fractures involving the right inferior and superior pubic rami with will follow and discuss options with patient.        Electronically Signed by:    Josy Laura NP-C  Nurse Practitioner  [address] [facility]

## 2018-12-28 NOTE — CM/SW NOTE
Attempted to follow up w/pt regarding dc plan. Pt on the commode. Still awaiting PT eval. SW entered a resume HH order if PT recommends HH at dc. MD feels the pt may need AI. Will follow up at a later time.

## 2018-12-28 NOTE — CONSULTS
[facility] Note:  Renal Dose Adjustment for Tramadol Doniphan Paling)    Sai BULLOCK Kait Hastings has been prescribed Tramadol (ULTRAM) 50 mg orally every 6 hours as needed for pain. Estimated Creatinine Clearance: 14.3 mL/min (A) (based on SCr of 2.49 mg/dL (H)).

## 2018-12-29 LAB
ANION GAP SERPL CALC-SCNC: 4 MMOL/L (ref 0–18)
BASOPHILS # BLD AUTO: 0.01 X10(3) UL (ref 0–0.1)
BASOPHILS NFR BLD AUTO: 0.1 %
BUN BLD-MCNC: 32 MG/DL (ref 8–20)
BUN/CREAT SERPL: 14.6 (ref 10–20)
CALCIUM BLD-MCNC: 9.4 MG/DL (ref 8.3–10.3)
CHLORIDE SERPL-SCNC: 107 MMOL/L (ref 101–111)
CO2 SERPL-SCNC: 29 MMOL/L (ref 22–32)
CREAT BLD-MCNC: 2.19 MG/DL (ref 0.55–1.02)
EOSINOPHIL # BLD AUTO: 0.08 X10(3) UL (ref 0–0.3)
EOSINOPHIL NFR BLD AUTO: 1.2 %
ERYTHROCYTE [DISTWIDTH] IN BLOOD BY AUTOMATED COUNT: 20.5 % (ref 11.5–16)
GLUCOSE BLD-MCNC: 112 MG/DL (ref 70–99)
HAV IGM SER QL: 1.8 MG/DL (ref 1.8–2.5)
HCT VFR BLD AUTO: 26.9 % (ref 34–50)
HGB BLD-MCNC: 8.7 G/DL (ref 12–16)
IMMATURE GRANULOCYTE COUNT: 0.04 X10(3) UL (ref 0–1)
IMMATURE GRANULOCYTE RATIO %: 0.6 %
LYMPHOCYTES # BLD AUTO: 0.43 X10(3) UL (ref 0.9–4)
LYMPHOCYTES NFR BLD AUTO: 6.2 %
MCH RBC QN AUTO: 23.1 PG (ref 27–33.2)
MCHC RBC AUTO-ENTMCNC: 32.3 G/DL (ref 31–37)
MCV RBC AUTO: 71.4 FL (ref 81–100)
MONOCYTES # BLD AUTO: 0.68 X10(3) UL (ref 0.1–1)
MONOCYTES NFR BLD AUTO: 9.8 %
NEUTROPHIL ABS PRELIM: 5.7 X10 (3) UL (ref 1.3–6.7)
NEUTROPHILS # BLD AUTO: 5.7 X10(3) UL (ref 1.3–6.7)
NEUTROPHILS NFR BLD AUTO: 82.1 %
OSMOLALITY SERPL CALC.SUM OF ELEC: 298 MOSM/KG (ref 275–295)
PLATELET # BLD AUTO: 211 10(3)UL (ref 150–450)
POTASSIUM SERPL-SCNC: 4.1 MMOL/L (ref 3.6–5.1)
RBC # BLD AUTO: 3.77 X10(6)UL (ref 3.8–5.1)
RED CELL DISTRIBUTION WIDTH-SD: 47.4 FL (ref 35.1–46.3)
SODIUM SERPL-SCNC: 140 MMOL/L (ref 136–144)
WBC # BLD AUTO: 6.9 X10(3) UL (ref 4–13)

## 2018-12-29 PROCEDURE — 99232 SBSQ HOSP IP/OBS MODERATE 35: CPT | Performed by: INTERNAL MEDICINE

## 2018-12-29 RX ORDER — ACETAMINOPHEN 325 MG/1
650 TABLET ORAL EVERY 4 HOURS PRN
Status: DISCONTINUED | OUTPATIENT
Start: 2018-12-29 | End: 2018-12-30

## 2018-12-29 RX ORDER — TRAMADOL HYDROCHLORIDE 50 MG/1
50 TABLET ORAL EVERY 12 HOURS PRN
Qty: 20 TABLET | Refills: 0 | Status: SHIPPED | OUTPATIENT
Start: 2018-12-29 | End: 2019-01-09

## 2018-12-29 NOTE — CONSULTS
[address]    PATIENT'S NAME: SHANNON/Davide Guerreroafshan BULLOCK   ATTENDING PHYSICIAN: Khari Estrada. Stephanie Hernandez M.D.   Marla Angel: Kat King M.D.    PATIENT ACCOUNT#:   [de-identified]    LOCATION:  [address] [place] [address] A [facility]  MEDICAL RECORD #:   [id number]       DATE Buel Sprain and affect. She is lying comfortably in her hospital bed. EXTREMITIES:  She has no tenderness to palpation about the pelvis. She has no pain with flexion or extension of the right hip. She has no pain with axial loading.   She does have some mild pain

## 2018-12-29 NOTE — PROGRESS NOTES
[facility] Hospitalist Progress Note                                                                   [address]  7/28/1934    CC: FU right hip pain    Interval History:  - Feels OK but s 26.4*  26.9*   MCV  72.2*  71.0*  71.4*   MCH  22.2*  23.4*  23.1*   MCHC  30.8*  33.0  32.3   RDW  21.4*  21.1*  20.5*   NEPRELIM  7.22*  7.68*  5.70   WBC  8.9  9.0  6.9   PLT  234.0  226.0  211.0     Recent Labs   Lab  12/27/18   0717  12/28/18   0557

## 2018-12-29 NOTE — PROGRESS NOTES
[facility]  Nephrology Progress Note    Ludmila Heredia Patient Status:  Inpatient    1934 MRN [id number]   [facility] 4NW-A Attending Rosalba Singh MD   Hosp Day # 2 PCP Mila Hall MD       SUBJECTIVE:  Notes ongoin No results for input(s): PGLU in the last 168 hours.     Meds:     Current Facility-Administered Medications:  acetaminophen (TYLENOL) tab 650 mg 650 mg Oral Q4H PRN   prednisoLONE acetate (PRED FORTE) 1 % ophthalmic suspension 2 drop 2 drop Right Eye mcg Subcutaneous Q30 Days         Impression/Plan:    #1. TANIA/CKD IV- baseline creat close to 2 and has been longstanding with hx obstructive uropathy. TANIA in setting of vol depletion and is better again today. Will d/c IVF and follow    #2.   Metabolic a

## 2018-12-29 NOTE — PHYSICAL THERAPY NOTE
PHYSICAL THERAPY EVALUATION - INPATIENT     Room Number: 405/405-A  Evaluation Date: 12/29/2018  Type of Evaluation: Initial  Physician Order: PT Eval and Treat    Presenting Problem: incompletely united inferior pubic ramus fracture  Reason for Therap History:   Diagnosis Date   • ALLERGIC RHINITIS    • ANXIETY    • Anxiety state    • Autoimmune disease ([facility]    • Blood disorder    • Cancer ([facility]     vulva 14 years ago, tx with surgery alone.    • Crohn disease ([facility]    • Crohn's disease ([facility]    • Anaoph 2003    vulvectomy per Dr. Josefina Ashley   • OTHER SURGICAL HISTORY      perirectal abcesses   • OTHER SURGICAL HISTORY  10-07    hip fracture       HOME SITUATION  Type of Home: House   Home Layout: Two level; Able to live on main level  Stairs to Enter : 2  Dissolve ACTIVITY TOLERANCE                         O2 WALK                  AM-PAC '6-Clicks' INPATIENT SHORT FORM - BASIC MOBILITY  How much difficulty does the patient currently have. ..  -   Turning over in bed (including adjusting bedclothes, sheets and and expectations. Functional activity tolerated  Transfer training    Patient End of Session: Up in chair; With [facility] staff;Needs met;RN aware of session/findings; All patient questions and concerns addressed(PCT present)    Eval completed.     ASSESSMENT Established Goals: 5    CURRENT GOALS       Goal #1 Patient is able to demonstrate supine - sit EOB @ level: modified independent     Goal #2 Patient is able to demonstrate transfers Sit to/from Stand at assistance level: modified independent     Goal #3 P

## 2018-12-29 NOTE — PROGRESS NOTES
Hematology/Oncology Progress Note    Patient Name: Ayla Bal  Medical Record Number: [id number]    YOB: 1934     Reason for Consultation:  Ayla Bal was seen today for the diagnosis of pelvic adenocarcinoma    Interval aron lb)  10/25/18 : 53.2 kg (117 lb 3.2 oz)  10/09/18 : 52.2 kg (115 lb)      Physical Examination:  General: Patient is alert and oriented, not in acute distress  CV: Regular rate and rhythm, no murmurs, no LE edema  Respiratory: Lungs clear to auscultation b

## 2018-12-30 VITALS
SYSTOLIC BLOOD PRESSURE: 147 MMHG | TEMPERATURE: 98 F | OXYGEN SATURATION: 97 % | BODY MASS INDEX: 19.53 KG/M2 | WEIGHT: 117.19 LBS | HEIGHT: 65 IN | DIASTOLIC BLOOD PRESSURE: 63 MMHG | RESPIRATION RATE: 20 BRPM | HEART RATE: 79 BPM

## 2018-12-30 LAB
ANION GAP SERPL CALC-SCNC: 6 MMOL/L (ref 0–18)
BUN BLD-MCNC: 32 MG/DL (ref 8–20)
BUN/CREAT SERPL: 14.8 (ref 10–20)
CALCIUM BLD-MCNC: 9.9 MG/DL (ref 8.3–10.3)
CHLORIDE SERPL-SCNC: 105 MMOL/L (ref 101–111)
CO2 SERPL-SCNC: 26 MMOL/L (ref 22–32)
CREAT BLD-MCNC: 2.16 MG/DL (ref 0.55–1.02)
GLUCOSE BLD-MCNC: 95 MG/DL (ref 70–99)
OSMOLALITY SERPL CALC.SUM OF ELEC: 291 MOSM/KG (ref 275–295)
POTASSIUM SERPL-SCNC: 4.4 MMOL/L (ref 3.6–5.1)
SODIUM SERPL-SCNC: 137 MMOL/L (ref 136–144)

## 2018-12-30 PROCEDURE — 99232 SBSQ HOSP IP/OBS MODERATE 35: CPT | Performed by: INTERNAL MEDICINE

## 2018-12-30 NOTE — CM/SW NOTE
Contacted pt again re: [facility] and [address] has accepted her for rehab. She is not sure which one to go to. SW discussed transportation - [employer] and est cost.  This would take her directly to rehab facility only.     Pt thinks that tomorrow would

## 2018-12-30 NOTE — PROGRESS NOTES
Hematology/Oncology Progress Note    Patient Name: Carrie Higuera  Medical Record Number: [id number]    YOB: 1934     Reason for Consultation:  Carrie Higuera was seen today for the diagnosis of pelvic adenocarcinoma    Interval aron distress  CV: Regular rate and rhythm, no murmurs, no LE edema  Respiratory: Lungs clear to auscultation bilaterally  GI/Abd: Soft, non-tender with normoactive bowel sounds.    Skin: no rashes or petechiae    Laboratory:  Recent Labs   Lab  12/27/18   0961

## 2018-12-30 NOTE — CM/SW NOTE
Pt given IM medicare and discussed. She does not want to appeal d/c. Reviewed choices- she has decided to go to the AI that she liked the best- The [address] at [facility].    Discussed est cost of [employer]- pt states that she has too much pain to sit

## 2018-12-30 NOTE — CM/SW NOTE
Call from RN re: PT rec for AI. SW spoke with pt re: AI choice. She states that she has been to [facility] of [facility].   She liked the [facility] best but states that her car is at the [place] [facility] and she did notify safety dept th

## 2018-12-30 NOTE — PROGRESS NOTES
[facility]  Nephrology Progress Note    Chava Ruvalcaba Patient Status:  Inpatient    1934 MRN [id number]   [facility] 4NW-A Attending Eva Kaufman MD   Hosp Day # 3 PCP Robert Zayas MD       SUBJECTIVE:  R hip pain p 0717   ALT  18   AST  14*   ALB  3.5       No results for input(s): PGLU in the last 168 hours.     Meds:     Current Facility-Administered Medications:  acetaminophen (TYLENOL) tab 650 mg 650 mg Oral Q4H PRN   prednisoLONE acetate (PRED FORTE) 1 % ophthalm IV- baseline creat close to 2 and has been longstanding with hx obstructive uropathy. TANIA in setting of vol depletion and is stable close to baseline off IVF. #2.  Metabolic acidosis- has been chronic and due to ostomy losses/CKD.   Cont sodium bicarb t

## 2018-12-30 NOTE — PROGRESS NOTES
[facility] Hospitalist Progress Note                                                                   [address]  7/28/1934    CC: FU right hip pain    Interval History:  pt doing well.  Kate Doll 0604   RBC  4.14  3.72*  3.77*   HGB  9.2*  8.7*  8.7*   HCT  29.9*  26.4*  26.9*   MCV  72.2*  71.0*  71.4*   MCH  22.2*  23.4*  23.1*   MCHC  30.8*  33.0  32.3   RDW  21.4*  21.1*  20.5*   NEPRELIM  7.22*  7.68*  5.70   WBC  8.9  9.0  6.9   PLT  234.0  2

## 2018-12-30 NOTE — CM/SW NOTE
Call from Fabian with Denis at [facility]. [phone] and can accept pt. Updated pt re: above. She is now thinking that it would be best to be closer to home, so maybe she can get someone to bring her clothes. She requests referral to [facility].     Ref

## 2018-12-31 NOTE — PROGRESS NOTES
[facility] here to transport pt to [facility]. Transfer records given to ambulance to given to staff at [place] [id number].

## 2019-01-01 ENCOUNTER — APPOINTMENT (OUTPATIENT)
Dept: HEMATOLOGY/ONCOLOGY | Age: 84
End: 2019-01-01
Attending: INTERNAL MEDICINE
Payer: MEDICARE

## 2019-01-01 ENCOUNTER — TELEPHONE (OUTPATIENT)
Dept: HEMATOLOGY/ONCOLOGY | Facility: HOSPITAL | Age: 84
End: 2019-01-01

## 2019-01-01 ENCOUNTER — HOSPITAL ENCOUNTER (OUTPATIENT)
Dept: INTERVENTIONAL RADIOLOGY/VASCULAR | Facility: HOSPITAL | Age: 84
Discharge: HOME | End: 2019-01-01
Attending: SPECIALIST | Admitting: SPECIALIST
Payer: MEDICARE

## 2019-01-01 ENCOUNTER — TELEPHONE (OUTPATIENT)
Dept: NEPHROLOGY | Facility: CLINIC | Age: 84
End: 2019-01-01

## 2019-01-01 ENCOUNTER — HOSPITAL ENCOUNTER (INPATIENT)
Facility: HOSPITAL | Age: 84
LOS: 4 days | Discharge: NURSING, REHAB OR LONG-TERM CARE | DRG: 682 | End: 2019-01-01
Attending: EMERGENCY MEDICINE | Admitting: HOSPITALIST
Payer: MEDICARE

## 2019-01-01 ENCOUNTER — APPOINTMENT (OUTPATIENT)
Dept: CT IMAGING | Facility: HOSPITAL | Age: 84
End: 2019-01-01
Attending: EMERGENCY MEDICINE
Payer: MEDICARE

## 2019-01-01 ENCOUNTER — APPOINTMENT (OUTPATIENT)
Dept: CT IMAGING | Facility: HOSPITAL | Age: 84
DRG: 682 | End: 2019-01-01
Attending: PHYSICIAN ASSISTANT
Payer: MEDICARE

## 2019-01-01 ENCOUNTER — SOCIAL WORK SERVICES (OUTPATIENT)
Dept: HEMATOLOGY/ONCOLOGY | Facility: HOSPITAL | Age: 84
End: 2019-01-01

## 2019-01-01 ENCOUNTER — APPOINTMENT (OUTPATIENT)
Dept: LAB | Facility: HOSPITAL | Age: 84
End: 2019-01-01
Attending: INTERNAL MEDICINE
Payer: MEDICARE

## 2019-01-01 ENCOUNTER — APPOINTMENT (OUTPATIENT)
Dept: ULTRASOUND IMAGING | Facility: HOSPITAL | Age: 84
DRG: 682 | End: 2019-01-01
Attending: INTERNAL MEDICINE
Payer: MEDICARE

## 2019-01-01 ENCOUNTER — OFFICE VISIT (OUTPATIENT)
Dept: HEMATOLOGY/ONCOLOGY | Age: 84
End: 2019-01-01
Attending: INTERNAL MEDICINE
Payer: MEDICARE

## 2019-01-01 ENCOUNTER — APPOINTMENT (OUTPATIENT)
Dept: HEMATOLOGY/ONCOLOGY | Facility: HOSPITAL | Age: 84
End: 2019-01-01
Attending: INTERNAL MEDICINE
Payer: MEDICARE

## 2019-01-01 ENCOUNTER — APPOINTMENT (OUTPATIENT)
Dept: CT IMAGING | Facility: HOSPITAL | Age: 84
DRG: 951 | End: 2019-01-01
Attending: INTERNAL MEDICINE
Payer: MEDICARE

## 2019-01-01 ENCOUNTER — TELEPHONE (OUTPATIENT)
Dept: WOUND CARE | Facility: HOSPITAL | Age: 84
End: 2019-01-01

## 2019-01-01 ENCOUNTER — HOSPITAL ENCOUNTER (OUTPATIENT)
Dept: CT IMAGING | Facility: HOSPITAL | Age: 84
Discharge: HOME | End: 2019-01-01
Attending: INTERNAL MEDICINE
Payer: MEDICARE

## 2019-01-01 ENCOUNTER — MED REC SCAN ONLY (OUTPATIENT)
Dept: HEMATOLOGY/ONCOLOGY | Facility: HOSPITAL | Age: 84
End: 2019-01-01

## 2019-01-01 ENCOUNTER — OFFICE VISIT (OUTPATIENT)
Dept: WOUND CARE | Facility: HOSPITAL | Age: 84
End: 2019-01-01
Attending: INTERNAL MEDICINE
Payer: MEDICARE

## 2019-01-01 ENCOUNTER — APPOINTMENT (OUTPATIENT)
Dept: CT IMAGING | Age: 84
DRG: 951 | End: 2019-01-01
Attending: EMERGENCY MEDICINE
Payer: MEDICARE

## 2019-01-01 ENCOUNTER — TELEPHONE (OUTPATIENT)
Dept: HEMATOLOGY/ONCOLOGY | Age: 84
End: 2019-01-01

## 2019-01-01 ENCOUNTER — HOSPITAL ENCOUNTER (INPATIENT)
Facility: HOSPITAL | Age: 84
LOS: 1 days | Discharge: HOME | DRG: 951 | End: 2019-01-01
Attending: EMERGENCY MEDICINE | Admitting: INTERNAL MEDICINE
Payer: MEDICARE

## 2019-01-01 ENCOUNTER — HOSPITAL ENCOUNTER (EMERGENCY)
Facility: HOSPITAL | Age: 84
Discharge: HOME | End: 2019-01-01
Attending: EMERGENCY MEDICINE
Payer: MEDICARE

## 2019-01-01 ENCOUNTER — HOSPITAL ENCOUNTER (OUTPATIENT)
Dept: CT IMAGING | Facility: HOSPITAL | Age: 84
Discharge: HOME | End: 2019-01-01
Attending: INTERNAL MEDICINE | Admitting: SPECIALIST
Payer: MEDICARE

## 2019-01-01 VITALS
OXYGEN SATURATION: 97 % | HEART RATE: 82 BPM | RESPIRATION RATE: 16 BRPM | BODY MASS INDEX: 15.42 KG/M2 | HEIGHT: 65 IN | SYSTOLIC BLOOD PRESSURE: 129 MMHG | DIASTOLIC BLOOD PRESSURE: 50 MMHG | TEMPERATURE: 100 F | WEIGHT: 92.56 LBS

## 2019-01-01 VITALS
TEMPERATURE: 98 F | DIASTOLIC BLOOD PRESSURE: 64 MMHG | RESPIRATION RATE: 18 BRPM | BODY MASS INDEX: 17 KG/M2 | SYSTOLIC BLOOD PRESSURE: 117 MMHG | HEART RATE: 80 BPM | OXYGEN SATURATION: 99 % | WEIGHT: 104.19 LBS

## 2019-01-01 VITALS
TEMPERATURE: 97 F | RESPIRATION RATE: 18 BRPM | OXYGEN SATURATION: 100 % | SYSTOLIC BLOOD PRESSURE: 138 MMHG | DIASTOLIC BLOOD PRESSURE: 74 MMHG | BODY MASS INDEX: 18 KG/M2 | HEART RATE: 81 BPM | WEIGHT: 109.19 LBS

## 2019-01-01 VITALS
SYSTOLIC BLOOD PRESSURE: 127 MMHG | RESPIRATION RATE: 18 BRPM | BODY MASS INDEX: 18 KG/M2 | HEART RATE: 86 BPM | TEMPERATURE: 98 F | OXYGEN SATURATION: 99 % | WEIGHT: 105.38 LBS | DIASTOLIC BLOOD PRESSURE: 64 MMHG

## 2019-01-01 VITALS
HEART RATE: 85 BPM | TEMPERATURE: 98 F | WEIGHT: 104.88 LBS | SYSTOLIC BLOOD PRESSURE: 118 MMHG | DIASTOLIC BLOOD PRESSURE: 69 MMHG | RESPIRATION RATE: 18 BRPM | OXYGEN SATURATION: 99 % | BODY MASS INDEX: 17 KG/M2

## 2019-01-01 VITALS
RESPIRATION RATE: 16 BRPM | SYSTOLIC BLOOD PRESSURE: 96 MMHG | DIASTOLIC BLOOD PRESSURE: 61 MMHG | TEMPERATURE: 99 F | HEART RATE: 76 BPM

## 2019-01-01 VITALS
DIASTOLIC BLOOD PRESSURE: 55 MMHG | RESPIRATION RATE: 18 BRPM | SYSTOLIC BLOOD PRESSURE: 101 MMHG | OXYGEN SATURATION: 97 % | HEART RATE: 81 BPM | TEMPERATURE: 98 F

## 2019-01-01 VITALS
HEART RATE: 91 BPM | TEMPERATURE: 98 F | SYSTOLIC BLOOD PRESSURE: 120 MMHG | RESPIRATION RATE: 18 BRPM | DIASTOLIC BLOOD PRESSURE: 68 MMHG | OXYGEN SATURATION: 99 %

## 2019-01-01 VITALS
WEIGHT: 110 LBS | TEMPERATURE: 97 F | SYSTOLIC BLOOD PRESSURE: 121 MMHG | HEART RATE: 97 BPM | OXYGEN SATURATION: 97 % | BODY MASS INDEX: 18 KG/M2 | RESPIRATION RATE: 18 BRPM | DIASTOLIC BLOOD PRESSURE: 73 MMHG

## 2019-01-01 VITALS
OXYGEN SATURATION: 100 % | BODY MASS INDEX: 17 KG/M2 | WEIGHT: 100 LBS | DIASTOLIC BLOOD PRESSURE: 55 MMHG | HEART RATE: 72 BPM | RESPIRATION RATE: 18 BRPM | SYSTOLIC BLOOD PRESSURE: 125 MMHG | TEMPERATURE: 96 F

## 2019-01-01 VITALS
TEMPERATURE: 98 F | SYSTOLIC BLOOD PRESSURE: 118 MMHG | WEIGHT: 106.88 LBS | DIASTOLIC BLOOD PRESSURE: 53 MMHG | HEART RATE: 73 BPM | BODY MASS INDEX: 18 KG/M2 | OXYGEN SATURATION: 99 % | RESPIRATION RATE: 18 BRPM

## 2019-01-01 VITALS
BODY MASS INDEX: 16.61 KG/M2 | SYSTOLIC BLOOD PRESSURE: 101 MMHG | TEMPERATURE: 98 F | HEIGHT: 65 IN | WEIGHT: 99.69 LBS | OXYGEN SATURATION: 97 % | RESPIRATION RATE: 16 BRPM | HEART RATE: 99 BPM | DIASTOLIC BLOOD PRESSURE: 64 MMHG

## 2019-01-01 VITALS
SYSTOLIC BLOOD PRESSURE: 126 MMHG | HEART RATE: 84 BPM | BODY MASS INDEX: 17 KG/M2 | WEIGHT: 103.88 LBS | TEMPERATURE: 99 F | DIASTOLIC BLOOD PRESSURE: 63 MMHG | RESPIRATION RATE: 18 BRPM

## 2019-01-01 VITALS
WEIGHT: 106 LBS | OXYGEN SATURATION: 100 % | BODY MASS INDEX: 18 KG/M2 | RESPIRATION RATE: 18 BRPM | DIASTOLIC BLOOD PRESSURE: 70 MMHG | TEMPERATURE: 98 F | HEART RATE: 93 BPM | SYSTOLIC BLOOD PRESSURE: 110 MMHG

## 2019-01-01 VITALS
HEART RATE: 76 BPM | RESPIRATION RATE: 18 BRPM | TEMPERATURE: 97 F | WEIGHT: 107 LBS | BODY MASS INDEX: 18 KG/M2 | OXYGEN SATURATION: 100 % | DIASTOLIC BLOOD PRESSURE: 57 MMHG | SYSTOLIC BLOOD PRESSURE: 109 MMHG

## 2019-01-01 VITALS
BODY MASS INDEX: 18 KG/M2 | WEIGHT: 105.63 LBS | HEART RATE: 85 BPM | TEMPERATURE: 98 F | OXYGEN SATURATION: 99 % | SYSTOLIC BLOOD PRESSURE: 110 MMHG | DIASTOLIC BLOOD PRESSURE: 53 MMHG | RESPIRATION RATE: 18 BRPM

## 2019-01-01 VITALS
HEART RATE: 85 BPM | TEMPERATURE: 96 F | SYSTOLIC BLOOD PRESSURE: 120 MMHG | RESPIRATION RATE: 11 BRPM | OXYGEN SATURATION: 99 % | DIASTOLIC BLOOD PRESSURE: 50 MMHG

## 2019-01-01 VITALS
OXYGEN SATURATION: 94 % | SYSTOLIC BLOOD PRESSURE: 111 MMHG | HEART RATE: 92 BPM | BODY MASS INDEX: 18 KG/M2 | DIASTOLIC BLOOD PRESSURE: 57 MMHG | TEMPERATURE: 99 F | RESPIRATION RATE: 18 BRPM | WEIGHT: 106.63 LBS

## 2019-01-01 VITALS — DIASTOLIC BLOOD PRESSURE: 59 MMHG | HEART RATE: 87 BPM | RESPIRATION RATE: 17 BRPM | SYSTOLIC BLOOD PRESSURE: 143 MMHG

## 2019-01-01 DIAGNOSIS — G89.3 NEOPLASM RELATED PAIN: Primary | ICD-10-CM

## 2019-01-01 DIAGNOSIS — C20 RECTAL CANCER (HCC): Primary | ICD-10-CM

## 2019-01-01 DIAGNOSIS — E86.0 DEHYDRATION: ICD-10-CM

## 2019-01-01 DIAGNOSIS — E83.39 HYPOPHOSPHATASIA: ICD-10-CM

## 2019-01-01 DIAGNOSIS — E87.2 METABOLIC ACIDOSIS: ICD-10-CM

## 2019-01-01 DIAGNOSIS — Z93.2 STATUS POST ILEOSTOMY (HCC): Primary | ICD-10-CM

## 2019-01-01 DIAGNOSIS — E87.5 HYPERKALEMIA: ICD-10-CM

## 2019-01-01 DIAGNOSIS — G89.3 NEOPLASM RELATED PAIN: ICD-10-CM

## 2019-01-01 DIAGNOSIS — N18.30 CKD (CHRONIC KIDNEY DISEASE) STAGE 3, GFR 30-59 ML/MIN (HCC): ICD-10-CM

## 2019-01-01 DIAGNOSIS — D50.0 IRON DEFICIENCY ANEMIA DUE TO CHRONIC BLOOD LOSS: ICD-10-CM

## 2019-01-01 DIAGNOSIS — D50.0 IRON DEFICIENCY ANEMIA DUE TO CHRONIC BLOOD LOSS: Primary | ICD-10-CM

## 2019-01-01 DIAGNOSIS — E83.42 HYPOMAGNESEMIA: ICD-10-CM

## 2019-01-01 DIAGNOSIS — G89.3 CHRONIC PAIN DUE TO MALIGNANT NEOPLASTIC DISEASE: ICD-10-CM

## 2019-01-01 DIAGNOSIS — C20 RECTAL CANCER (HCC): ICD-10-CM

## 2019-01-01 DIAGNOSIS — N89.8 VAGINAL DISCHARGE: ICD-10-CM

## 2019-01-01 DIAGNOSIS — D63.1 ANEMIA IN STAGE 3 CHRONIC KIDNEY DISEASE (HCC): ICD-10-CM

## 2019-01-01 DIAGNOSIS — Z98.890 HISTORY OF DRAINAGE OF ABSCESS: Primary | ICD-10-CM

## 2019-01-01 DIAGNOSIS — N18.4 ANEMIA IN STAGE 4 CHRONIC KIDNEY DISEASE (HCC): ICD-10-CM

## 2019-01-01 DIAGNOSIS — D63.1 ANEMIA IN STAGE 4 CHRONIC KIDNEY DISEASE (HCC): ICD-10-CM

## 2019-01-01 DIAGNOSIS — N18.30 ANEMIA IN STAGE 3 CHRONIC KIDNEY DISEASE (HCC): ICD-10-CM

## 2019-01-01 DIAGNOSIS — L02.91 ABSCESS: ICD-10-CM

## 2019-01-01 DIAGNOSIS — N82.3 RECTOVAGINAL FISTULA: ICD-10-CM

## 2019-01-01 DIAGNOSIS — N17.9 AKI (ACUTE KIDNEY INJURY) (HCC): ICD-10-CM

## 2019-01-01 DIAGNOSIS — T19.2XXA FOREIGN BODY IN VAGINA, INITIAL ENCOUNTER: Primary | ICD-10-CM

## 2019-01-01 DIAGNOSIS — D64.89 ANEMIA DUE TO OTHER CAUSE, NOT CLASSIFIED: ICD-10-CM

## 2019-01-01 DIAGNOSIS — N18.9 ACUTE RENAL FAILURE SUPERIMPOSED ON CHRONIC KIDNEY DISEASE, UNSPECIFIED CKD STAGE, UNSPECIFIED ACUTE RENAL FAILURE TYPE (HCC): ICD-10-CM

## 2019-01-01 DIAGNOSIS — F41.9 ANXIETY: ICD-10-CM

## 2019-01-01 DIAGNOSIS — L02.91 ABSCESS: Primary | ICD-10-CM

## 2019-01-01 DIAGNOSIS — G89.3 CANCER ASSOCIATED PAIN: ICD-10-CM

## 2019-01-01 DIAGNOSIS — N17.9 ACUTE RENAL FAILURE SUPERIMPOSED ON CHRONIC KIDNEY DISEASE, UNSPECIFIED CKD STAGE, UNSPECIFIED ACUTE RENAL FAILURE TYPE (HCC): ICD-10-CM

## 2019-01-01 DIAGNOSIS — E87.5 HYPERKALEMIA: Primary | ICD-10-CM

## 2019-01-01 DIAGNOSIS — R53.1 WEAKNESS GENERALIZED: ICD-10-CM

## 2019-01-01 DIAGNOSIS — N18.30 CKD (CHRONIC KIDNEY DISEASE) STAGE 3, GFR 30-59 ML/MIN (HCC): Primary | ICD-10-CM

## 2019-01-01 DIAGNOSIS — N25.89: ICD-10-CM

## 2019-01-01 LAB
ALBUMIN SERPL-MCNC: 2.3 G/DL (ref 3.4–5)
ALBUMIN SERPL-MCNC: 2.9 G/DL (ref 3.4–5)
ALBUMIN SERPL-MCNC: 3.1 G/DL (ref 3.4–5)
ALBUMIN SERPL-MCNC: 3.2 G/DL (ref 3.1–4.5)
ALBUMIN SERPL-MCNC: 3.2 G/DL (ref 3.4–5)
ALBUMIN/GLOB SERPL: 0.6 {RATIO} (ref 1–2)
ALBUMIN/GLOB SERPL: 0.7 {RATIO} (ref 1–2)
ALP LIVER SERPL-CCNC: 111 U/L (ref 55–142)
ALP LIVER SERPL-CCNC: 111 U/L (ref 55–142)
ALP LIVER SERPL-CCNC: 121 U/L (ref 55–142)
ALP LIVER SERPL-CCNC: 158 U/L (ref 55–142)
ALP LIVER SERPL-CCNC: 90 U/L (ref 55–142)
ALT SERPL-CCNC: 10 U/L (ref 13–56)
ALT SERPL-CCNC: 12 U/L (ref 13–56)
ALT SERPL-CCNC: 13 U/L (ref 13–56)
ALT SERPL-CCNC: 18 U/L (ref 13–56)
ALT SERPL-CCNC: 22 U/L (ref 14–54)
ANION GAP SERPL CALC-SCNC: 10 MMOL/L (ref 0–18)
ANION GAP SERPL CALC-SCNC: 11 MMOL/L (ref 0–18)
ANION GAP SERPL CALC-SCNC: 12 MMOL/L (ref 0–18)
ANION GAP SERPL CALC-SCNC: 12 MMOL/L (ref 0–18)
ANION GAP SERPL CALC-SCNC: 5 MMOL/L (ref 0–18)
ANION GAP SERPL CALC-SCNC: 7 MMOL/L (ref 0–18)
ANION GAP SERPL CALC-SCNC: 9 MMOL/L (ref 0–18)
ANTIBODY SCREEN: NEGATIVE
AST SERPL-CCNC: 10 U/L (ref 15–37)
AST SERPL-CCNC: 6 U/L (ref 15–37)
AST SERPL-CCNC: 7 U/L (ref 15–37)
AST SERPL-CCNC: 7 U/L (ref 15–37)
AST SERPL-CCNC: 8 U/L (ref 15–41)
ATRIAL RATE: 75 BPM
ATRIAL RATE: 90 BPM
BASOPHILS # BLD AUTO: 0.03 X10(3) UL (ref 0–0.2)
BASOPHILS # BLD AUTO: 0.04 X10(3) UL (ref 0–0.2)
BASOPHILS # BLD AUTO: 0.05 X10(3) UL (ref 0–0.2)
BASOPHILS # BLD AUTO: 0.06 X10(3) UL (ref 0–0.2)
BASOPHILS # BLD AUTO: 0.06 X10(3) UL (ref 0–0.2)
BASOPHILS NFR BLD AUTO: 0.2 %
BASOPHILS NFR BLD AUTO: 0.4 %
BILIRUB SERPL-MCNC: 0.3 MG/DL (ref 0.1–2)
BILIRUB SERPL-MCNC: 0.4 MG/DL (ref 0.1–2)
BILIRUB SERPL-MCNC: 0.4 MG/DL (ref 0.1–2)
BILIRUB SERPL-MCNC: 0.5 MG/DL (ref 0.1–2)
BILIRUB SERPL-MCNC: 0.7 MG/DL (ref 0.1–2)
BILIRUB UR QL STRIP.AUTO: NEGATIVE
BLOOD TYPE BARCODE: 6200
BUN BLD-MCNC: 34 MG/DL (ref 7–18)
BUN BLD-MCNC: 37 MG/DL (ref 7–18)
BUN BLD-MCNC: 41 MG/DL (ref 7–18)
BUN BLD-MCNC: 43 MG/DL (ref 7–18)
BUN BLD-MCNC: 46 MG/DL (ref 8–20)
BUN BLD-MCNC: 58 MG/DL (ref 7–18)
BUN BLD-MCNC: 65 MG/DL (ref 7–18)
BUN BLD-MCNC: 72 MG/DL (ref 7–18)
BUN BLD-MCNC: 77 MG/DL (ref 7–18)
BUN BLD-MCNC: 82 MG/DL (ref 7–18)
BUN BLD-MCNC: 85 MG/DL (ref 7–18)
BUN/CREAT SERPL: 14.3 (ref 10–20)
BUN/CREAT SERPL: 15.2 (ref 10–20)
BUN/CREAT SERPL: 16.1 (ref 10–20)
BUN/CREAT SERPL: 17.4 (ref 10–20)
BUN/CREAT SERPL: 18.1 (ref 10–20)
BUN/CREAT SERPL: 19.1 (ref 10–20)
BUN/CREAT SERPL: 19.7 (ref 10–20)
BUN/CREAT SERPL: 21.1 (ref 10–20)
BUN/CREAT SERPL: 21.6 (ref 10–20)
BUN/CREAT SERPL: 21.6 (ref 10–20)
BUN/CREAT SERPL: 26.4 (ref 10–20)
CALCIUM BLD-MCNC: 10.1 MG/DL (ref 8.5–10.1)
CALCIUM BLD-MCNC: 10.4 MG/DL (ref 8.3–10.3)
CALCIUM BLD-MCNC: 10.5 MG/DL (ref 8.5–10.1)
CALCIUM BLD-MCNC: 8.6 MG/DL (ref 8.5–10.1)
CALCIUM BLD-MCNC: 9 MG/DL (ref 8.5–10.1)
CALCIUM BLD-MCNC: 9.1 MG/DL (ref 8.5–10.1)
CALCIUM BLD-MCNC: 9.2 MG/DL (ref 8.5–10.1)
CALCIUM BLD-MCNC: 9.3 MG/DL (ref 8.5–10.1)
CALCIUM BLD-MCNC: 9.9 MG/DL (ref 8.5–10.1)
CHLORIDE SERPL-SCNC: 106 MMOL/L (ref 98–107)
CHLORIDE SERPL-SCNC: 107 MMOL/L (ref 101–111)
CHLORIDE SERPL-SCNC: 107 MMOL/L (ref 98–107)
CHLORIDE SERPL-SCNC: 108 MMOL/L (ref 98–107)
CHLORIDE SERPL-SCNC: 108 MMOL/L (ref 98–112)
CHLORIDE SERPL-SCNC: 111 MMOL/L (ref 98–112)
CHLORIDE SERPL-SCNC: 112 MMOL/L (ref 98–112)
CHLORIDE SERPL-SCNC: 116 MMOL/L (ref 98–112)
CHLORIDE SERPL-SCNC: 117 MMOL/L (ref 98–112)
CO2 SERPL-SCNC: 12 MMOL/L (ref 21–32)
CO2 SERPL-SCNC: 15 MMOL/L (ref 21–32)
CO2 SERPL-SCNC: 16 MMOL/L (ref 21–32)
CO2 SERPL-SCNC: 16 MMOL/L (ref 21–32)
CO2 SERPL-SCNC: 18 MMOL/L (ref 21–32)
CO2 SERPL-SCNC: 19 MMOL/L (ref 21–32)
CO2 SERPL-SCNC: 19 MMOL/L (ref 22–32)
CO2 SERPL-SCNC: 22 MMOL/L (ref 21–32)
CO2 SERPL-SCNC: 24 MMOL/L (ref 21–32)
CO2 SERPL-SCNC: 24 MMOL/L (ref 21–32)
CO2 SERPL-SCNC: 8 MMOL/L (ref 21–32)
COLOR UR AUTO: YELLOW
CREAT BLD-MCNC: 2.11 MG/DL (ref 0.55–1.02)
CREAT BLD-MCNC: 2.15 MG/DL (ref 0.55–1.02)
CREAT BLD-MCNC: 2.58 MG/DL (ref 0.55–1.02)
CREAT BLD-MCNC: 2.64 MG/DL (ref 0.55–1.02)
CREAT BLD-MCNC: 2.69 MG/DL (ref 0.55–1.02)
CREAT BLD-MCNC: 2.82 MG/DL (ref 0.55–1.02)
CREAT BLD-MCNC: 3.22 MG/DL (ref 0.55–1.02)
CREAT BLD-MCNC: 3.41 MG/DL (ref 0.55–1.02)
CREAT BLD-MCNC: 3.56 MG/DL (ref 0.55–1.02)
CREAT BLD-MCNC: 3.59 MG/DL (ref 0.55–1.02)
CREAT BLD-MCNC: 4.17 MG/DL (ref 0.55–1.02)
CREAT UR-SCNC: 92.1 MG/DL
DEPRECATED HBV CORE AB SER IA-ACNC: 1086.5 NG/ML (ref 18–340)
DEPRECATED HBV CORE AB SER IA-ACNC: 1413.6 NG/ML (ref 18–340)
DEPRECATED HBV CORE AB SER IA-ACNC: 1597.3 NG/ML (ref 18–340)
DEPRECATED RDW RBC AUTO: 43.9 FL (ref 35.1–46.3)
DEPRECATED RDW RBC AUTO: 45.4 FL (ref 35.1–46.3)
DEPRECATED RDW RBC AUTO: 45.5 FL (ref 35.1–46.3)
DEPRECATED RDW RBC AUTO: 46.1 FL (ref 35.1–46.3)
DEPRECATED RDW RBC AUTO: 48 FL (ref 35.1–46.3)
DEPRECATED RDW RBC AUTO: 55.4 FL (ref 35.1–46.3)
DEPRECATED RDW RBC AUTO: 57.1 FL (ref 35.1–46.3)
DEPRECATED RDW RBC AUTO: 57.1 FL (ref 35.1–46.3)
DEPRECATED RDW RBC AUTO: 63.3 FL (ref 35.1–46.3)
DEPRECATED RDW RBC AUTO: 65.9 FL (ref 35.1–46.3)
DEPRECATED RDW RBC AUTO: 70 FL (ref 35.1–46.3)
EOSINOPHIL # BLD AUTO: 0.04 X10(3) UL (ref 0–0.7)
EOSINOPHIL # BLD AUTO: 0.05 X10(3) UL (ref 0–0.7)
EOSINOPHIL # BLD AUTO: 0.1 X10(3) UL (ref 0–0.7)
EOSINOPHIL # BLD AUTO: 0.11 X10(3) UL (ref 0–0.7)
EOSINOPHIL # BLD AUTO: 0.12 X10(3) UL (ref 0–0.7)
EOSINOPHIL # BLD AUTO: 0.14 X10(3) UL (ref 0–0.7)
EOSINOPHIL # BLD AUTO: 0.16 X10(3) UL (ref 0–0.7)
EOSINOPHIL NFR BLD AUTO: 0.4 %
EOSINOPHIL NFR BLD AUTO: 0.4 %
EOSINOPHIL NFR BLD AUTO: 0.7 %
EOSINOPHIL NFR BLD AUTO: 0.8 %
EOSINOPHIL NFR BLD AUTO: 0.9 %
EOSINOPHIL NFR BLD AUTO: 1.1 %
EOSINOPHIL NFR BLD AUTO: 1.3 %
ERYTHROCYTE [DISTWIDTH] IN BLOOD BY AUTOMATED COUNT: 19.4 % (ref 11–15)
ERYTHROCYTE [DISTWIDTH] IN BLOOD BY AUTOMATED COUNT: 20.1 % (ref 11–15)
ERYTHROCYTE [DISTWIDTH] IN BLOOD BY AUTOMATED COUNT: 20.4 % (ref 11–15)
ERYTHROCYTE [DISTWIDTH] IN BLOOD BY AUTOMATED COUNT: 20.9 % (ref 11–15)
ERYTHROCYTE [DISTWIDTH] IN BLOOD BY AUTOMATED COUNT: 21.6 % (ref 11–15)
ERYTHROCYTE [DISTWIDTH] IN BLOOD BY AUTOMATED COUNT: 24.3 % (ref 11–15)
ERYTHROCYTE [DISTWIDTH] IN BLOOD BY AUTOMATED COUNT: 25.9 % (ref 11–15)
ERYTHROCYTE [DISTWIDTH] IN BLOOD BY AUTOMATED COUNT: 26.1 % (ref 11–15)
ERYTHROCYTE [DISTWIDTH] IN BLOOD BY AUTOMATED COUNT: 26.7 % (ref 11–15)
ERYTHROCYTE [DISTWIDTH] IN BLOOD BY AUTOMATED COUNT: 28.8 % (ref 11–15)
ERYTHROCYTE [DISTWIDTH] IN BLOOD BY AUTOMATED COUNT: 29 % (ref 11–15)
GLOBULIN PLAS-MCNC: 3.7 G/DL (ref 2.8–4.4)
GLOBULIN PLAS-MCNC: 4.6 G/DL (ref 2.8–4.4)
GLOBULIN PLAS-MCNC: 5 G/DL (ref 2.8–4.4)
GLUCOSE BLD-MCNC: 103 MG/DL (ref 70–99)
GLUCOSE BLD-MCNC: 105 MG/DL (ref 70–99)
GLUCOSE BLD-MCNC: 110 MG/DL (ref 70–99)
GLUCOSE BLD-MCNC: 112 MG/DL (ref 70–99)
GLUCOSE BLD-MCNC: 116 MG/DL (ref 70–99)
GLUCOSE BLD-MCNC: 117 MG/DL (ref 70–99)
GLUCOSE BLD-MCNC: 137 MG/DL (ref 70–99)
GLUCOSE BLD-MCNC: 75 MG/DL (ref 70–99)
GLUCOSE BLD-MCNC: 83 MG/DL (ref 70–99)
GLUCOSE BLD-MCNC: 91 MG/DL (ref 70–99)
GLUCOSE BLD-MCNC: 92 MG/DL (ref 70–99)
GLUCOSE BLD-MCNC: 98 MG/DL (ref 70–99)
GLUCOSE UR STRIP.AUTO-MCNC: NEGATIVE MG/DL
HAV IGM SER QL: 1.3 MG/DL (ref 1.6–2.6)
HAV IGM SER QL: 1.3 MG/DL (ref 1.6–2.6)
HAV IGM SER QL: 1.5 MG/DL (ref 1.6–2.6)
HAV IGM SER QL: 1.6 MG/DL (ref 1.6–2.6)
HAV IGM SER QL: 1.8 MG/DL (ref 1.6–2.6)
HCT VFR BLD AUTO: 20.7 % (ref 35–48)
HCT VFR BLD AUTO: 21.8 % (ref 35–48)
HCT VFR BLD AUTO: 23.4 % (ref 35–48)
HCT VFR BLD AUTO: 25.5 % (ref 35–48)
HCT VFR BLD AUTO: 25.9 % (ref 35–48)
HCT VFR BLD AUTO: 27.1 % (ref 35–48)
HCT VFR BLD AUTO: 27.3 % (ref 35–48)
HCT VFR BLD AUTO: 27.9 % (ref 35–48)
HCT VFR BLD AUTO: 28.2 % (ref 35–48)
HCT VFR BLD AUTO: 28.5 % (ref 35–48)
HCT VFR BLD AUTO: 32.8 % (ref 35–48)
HGB BLD-MCNC: 10.2 G/DL (ref 12–16)
HGB BLD-MCNC: 6.7 G/DL (ref 12–16)
HGB BLD-MCNC: 7.2 G/DL (ref 12–16)
HGB BLD-MCNC: 7.4 G/DL (ref 12–16)
HGB BLD-MCNC: 8 G/DL (ref 12–16)
HGB BLD-MCNC: 8.3 G/DL (ref 12–16)
HGB BLD-MCNC: 8.5 G/DL (ref 12–16)
HGB BLD-MCNC: 8.5 G/DL (ref 12–16)
HGB BLD-MCNC: 8.6 G/DL (ref 12–16)
HGB BLD-MCNC: 8.7 G/DL (ref 12–16)
HGB BLD-MCNC: 9.1 G/DL (ref 12–16)
IMM GRANULOCYTES # BLD AUTO: 0.08 X10(3) UL (ref 0–1)
IMM GRANULOCYTES # BLD AUTO: 0.12 X10(3) UL (ref 0–1)
IMM GRANULOCYTES # BLD AUTO: 0.15 X10(3) UL (ref 0–1)
IMM GRANULOCYTES # BLD AUTO: 0.17 X10(3) UL (ref 0–1)
IMM GRANULOCYTES # BLD AUTO: 0.22 X10(3) UL (ref 0–1)
IMM GRANULOCYTES # BLD AUTO: 0.27 X10(3) UL (ref 0–1)
IMM GRANULOCYTES # BLD AUTO: 0.38 X10(3) UL (ref 0–1)
IMM GRANULOCYTES NFR BLD: 0.6 %
IMM GRANULOCYTES NFR BLD: 1.3 %
IMM GRANULOCYTES NFR BLD: 1.6 %
IMM GRANULOCYTES NFR BLD: 2.2 %
IMM GRANULOCYTES NFR BLD: 3.1 %
INR BLD: 0.97 (ref 0.9–1.1)
INR BLD: 0.98 (ref 0.9–1.1)
IRON SATURATION: 10 % (ref 15–50)
IRON SATURATION: 10 % (ref 15–50)
IRON SATURATION: 14 % (ref 15–50)
IRON SATURATION: 23 % (ref 15–50)
IRON SERPL-MCNC: 24 UG/DL (ref 50–170)
IRON SERPL-MCNC: 29 UG/DL (ref 50–170)
IRON SERPL-MCNC: 70 UG/DL (ref 50–170)
IRON: 43 UG/DL (ref 28–170)
KETONES UR STRIP.AUTO-MCNC: NEGATIVE MG/DL
LACTATE SERPL-SCNC: 1 MMOL/L (ref 0.4–2)
LYMPHOCYTES # BLD AUTO: 0.49 X10(3) UL (ref 1–4)
LYMPHOCYTES # BLD AUTO: 0.58 X10(3) UL (ref 1–4)
LYMPHOCYTES # BLD AUTO: 0.6 X10(3) UL (ref 1–4)
LYMPHOCYTES # BLD AUTO: 0.66 X10(3) UL (ref 1–4)
LYMPHOCYTES # BLD AUTO: 0.68 X10(3) UL (ref 1–4)
LYMPHOCYTES # BLD AUTO: 0.71 X10(3) UL (ref 1–4)
LYMPHOCYTES # BLD AUTO: 0.9 X10(3) UL (ref 1–4)
LYMPHOCYTES NFR BLD AUTO: 4.7 %
LYMPHOCYTES NFR BLD AUTO: 4.8 %
LYMPHOCYTES NFR BLD AUTO: 4.8 %
LYMPHOCYTES NFR BLD AUTO: 5 %
LYMPHOCYTES NFR BLD AUTO: 5.4 %
LYMPHOCYTES NFR BLD AUTO: 5.7 %
LYMPHOCYTES NFR BLD AUTO: 6.9 %
M PROTEIN MFR SERPL ELPH: 6 G/DL (ref 6.4–8.2)
M PROTEIN MFR SERPL ELPH: 7.7 G/DL (ref 6.4–8.2)
M PROTEIN MFR SERPL ELPH: 7.9 G/DL (ref 6.4–8.2)
M PROTEIN MFR SERPL ELPH: 8.2 G/DL (ref 6.4–8.2)
M PROTEIN MFR SERPL ELPH: 8.2 G/DL (ref 6.4–8.2)
MCH RBC QN AUTO: 20 PG (ref 26–34)
MCH RBC QN AUTO: 20.4 PG (ref 26–34)
MCH RBC QN AUTO: 20.6 PG (ref 26–34)
MCH RBC QN AUTO: 20.6 PG (ref 26–34)
MCH RBC QN AUTO: 20.7 PG (ref 26–34)
MCH RBC QN AUTO: 21 PG (ref 26–34)
MCH RBC QN AUTO: 21.1 PG (ref 26–34)
MCH RBC QN AUTO: 21.4 PG (ref 26–34)
MCH RBC QN AUTO: 21.8 PG (ref 26–34)
MCH RBC QN AUTO: 22.1 PG (ref 26–34)
MCH RBC QN AUTO: 22.2 PG (ref 26–34)
MCHC RBC AUTO-ENTMCNC: 30.2 G/DL (ref 31–37)
MCHC RBC AUTO-ENTMCNC: 31.1 G/DL (ref 31–37)
MCHC RBC AUTO-ENTMCNC: 31.1 G/DL (ref 31–37)
MCHC RBC AUTO-ENTMCNC: 31.2 G/DL (ref 31–37)
MCHC RBC AUTO-ENTMCNC: 31.4 G/DL (ref 31–37)
MCHC RBC AUTO-ENTMCNC: 31.4 G/DL (ref 31–37)
MCHC RBC AUTO-ENTMCNC: 31.6 G/DL (ref 31–37)
MCHC RBC AUTO-ENTMCNC: 32.3 G/DL (ref 31–37)
MCHC RBC AUTO-ENTMCNC: 32.4 G/DL (ref 31–37)
MCHC RBC AUTO-ENTMCNC: 32.8 G/DL (ref 31–37)
MCHC RBC AUTO-ENTMCNC: 33 G/DL (ref 31–37)
MCV RBC AUTO: 62.8 FL (ref 80–100)
MCV RBC AUTO: 63.7 FL (ref 80–100)
MCV RBC AUTO: 65.7 FL (ref 80–100)
MCV RBC AUTO: 65.7 FL (ref 80–100)
MCV RBC AUTO: 66.1 FL (ref 80–100)
MCV RBC AUTO: 67.4 FL (ref 80–100)
MCV RBC AUTO: 67.5 FL (ref 80–100)
MCV RBC AUTO: 67.6 FL (ref 80–100)
MCV RBC AUTO: 67.6 FL (ref 80–100)
MCV RBC AUTO: 68.3 FL (ref 80–100)
MCV RBC AUTO: 70.1 FL (ref 80–100)
MONOCYTES # BLD AUTO: 0.6 X10(3) UL (ref 0.1–1)
MONOCYTES # BLD AUTO: 0.63 X10(3) UL (ref 0.1–1)
MONOCYTES # BLD AUTO: 0.84 X10(3) UL (ref 0.1–1)
MONOCYTES # BLD AUTO: 0.88 X10(3) UL (ref 0.1–1)
MONOCYTES # BLD AUTO: 1.13 X10(3) UL (ref 0.1–1)
MONOCYTES NFR BLD AUTO: 5 %
MONOCYTES NFR BLD AUTO: 5.1 %
MONOCYTES NFR BLD AUTO: 5.1 %
MONOCYTES NFR BLD AUTO: 6.1 %
MONOCYTES NFR BLD AUTO: 6.2 %
MONOCYTES NFR BLD AUTO: 7 %
MONOCYTES NFR BLD AUTO: 8.7 %
NEUTROPHILS # BLD AUTO: 10.53 X10 (3) UL (ref 1.5–7.7)
NEUTROPHILS # BLD AUTO: 10.53 X10(3) UL (ref 1.5–7.7)
NEUTROPHILS # BLD AUTO: 10.57 X10 (3) UL (ref 1.5–7.7)
NEUTROPHILS # BLD AUTO: 10.57 X10(3) UL (ref 1.5–7.7)
NEUTROPHILS # BLD AUTO: 10.61 X10 (3) UL (ref 1.5–7.7)
NEUTROPHILS # BLD AUTO: 10.61 X10(3) UL (ref 1.5–7.7)
NEUTROPHILS # BLD AUTO: 10.65 X10 (3) UL (ref 1.5–7.7)
NEUTROPHILS # BLD AUTO: 10.65 X10(3) UL (ref 1.5–7.7)
NEUTROPHILS # BLD AUTO: 12.04 X10 (3) UL (ref 1.5–7.7)
NEUTROPHILS # BLD AUTO: 12.04 X10(3) UL (ref 1.5–7.7)
NEUTROPHILS # BLD AUTO: 12.16 X10 (3) UL (ref 1.5–7.7)
NEUTROPHILS # BLD AUTO: 12.16 X10(3) UL (ref 1.5–7.7)
NEUTROPHILS # BLD AUTO: 7.68 X10 (3) UL (ref 1.5–7.7)
NEUTROPHILS # BLD AUTO: 7.68 X10(3) UL (ref 1.5–7.7)
NEUTROPHILS NFR BLD AUTO: 81.8 %
NEUTROPHILS NFR BLD AUTO: 84.8 %
NEUTROPHILS NFR BLD AUTO: 85.5 %
NEUTROPHILS NFR BLD AUTO: 86.2 %
NEUTROPHILS NFR BLD AUTO: 86.3 %
NEUTROPHILS NFR BLD AUTO: 87.4 %
NEUTROPHILS NFR BLD AUTO: 87.9 %
NITRITE UR QL STRIP.AUTO: NEGATIVE
OSMOLALITY SERPL CALC.SUM OF ELEC: 284 MOSM/KG (ref 275–295)
OSMOLALITY SERPL CALC.SUM OF ELEC: 293 MOSM/KG (ref 275–295)
OSMOLALITY SERPL CALC.SUM OF ELEC: 298 MOSM/KG (ref 275–295)
OSMOLALITY SERPL CALC.SUM OF ELEC: 300 MOSM/KG (ref 275–295)
OSMOLALITY SERPL CALC.SUM OF ELEC: 300 MOSM/KG (ref 275–295)
OSMOLALITY SERPL CALC.SUM OF ELEC: 301 MOSM/KG (ref 275–295)
OSMOLALITY SERPL CALC.SUM OF ELEC: 302 MOSM/KG (ref 275–295)
OSMOLALITY SERPL CALC.SUM OF ELEC: 304 MOSM/KG (ref 275–295)
OSMOLALITY SERPL CALC.SUM OF ELEC: 306 MOSM/KG (ref 275–295)
OSMOLALITY SERPL CALC.SUM OF ELEC: 308 MOSM/KG (ref 275–295)
OSMOLALITY SERPL CALC.SUM OF ELEC: 316 MOSM/KG (ref 275–295)
P AXIS: 77 DEGREES
P AXIS: 90 DEGREES
P-R INTERVAL: 146 MS
P-R INTERVAL: 160 MS
PH UR STRIP.AUTO: 5 [PH] (ref 4.5–8)
PHOSPHATE SERPL-MCNC: 2.3 MG/DL (ref 2.5–4.9)
PHOSPHATE SERPL-MCNC: 3.5 MG/DL (ref 2.5–4.9)
PHOSPHATE SERPL-MCNC: 4 MG/DL (ref 2.5–4.9)
PHOSPHATE SERPL-MCNC: 5.4 MG/DL (ref 2.5–4.9)
PLATELET # BLD AUTO: 181 10(3)UL (ref 150–450)
PLATELET # BLD AUTO: 197 10(3)UL (ref 150–450)
PLATELET # BLD AUTO: 212 10(3)UL (ref 150–450)
PLATELET # BLD AUTO: 223 10(3)UL (ref 150–450)
PLATELET # BLD AUTO: 251 10(3)UL (ref 150–450)
PLATELET # BLD AUTO: 280 10(3)UL (ref 150–450)
PLATELET # BLD AUTO: 308 10(3)UL (ref 150–450)
PLATELET # BLD AUTO: 332 10(3)UL (ref 150–450)
PLATELET # BLD AUTO: 361 10(3)UL (ref 150–450)
PLATELET # BLD AUTO: 458 10(3)UL (ref 150–450)
PLATELET # BLD AUTO: 477 10(3)UL (ref 150–450)
PLATELET MORPHOLOGY: NORMAL
POTASSIUM SERPL-SCNC: 3.7 MMOL/L (ref 3.5–5.1)
POTASSIUM SERPL-SCNC: 3.9 MMOL/L (ref 3.5–5.1)
POTASSIUM SERPL-SCNC: 4 MMOL/L (ref 3.5–5.1)
POTASSIUM SERPL-SCNC: 4.2 MMOL/L (ref 3.5–5.1)
POTASSIUM SERPL-SCNC: 4.7 MMOL/L (ref 3.5–5.1)
POTASSIUM SERPL-SCNC: 4.9 MMOL/L (ref 3.5–5.1)
POTASSIUM SERPL-SCNC: 4.9 MMOL/L (ref 3.6–5.1)
POTASSIUM SERPL-SCNC: 5 MMOL/L (ref 3.5–5.1)
POTASSIUM SERPL-SCNC: 5.4 MMOL/L (ref 3.5–5.1)
POTASSIUM SERPL-SCNC: 5.4 MMOL/L (ref 3.5–5.1)
POTASSIUM SERPL-SCNC: 5.9 MMOL/L (ref 3.5–5.1)
PROT UR STRIP.AUTO-MCNC: 30 MG/DL
PSA SERPL DL<=0.01 NG/ML-MCNC: 13.3 SECONDS (ref 12.5–14.7)
PSA SERPL DL<=0.01 NG/ML-MCNC: 13.4 SECONDS (ref 12.5–14.7)
Q-T INTERVAL: 334 MS
Q-T INTERVAL: 360 MS
QRS DURATION: 70 MS
QRS DURATION: 72 MS
QTC CALCULATION (BEZET): 402 MS
QTC CALCULATION (BEZET): 408 MS
R AXIS: 76 DEGREES
R AXIS: 89 DEGREES
RBC # BLD AUTO: 3.25 X10(6)UL (ref 3.8–5.3)
RBC # BLD AUTO: 3.34 X10(6)UL (ref 3.8–5.3)
RBC # BLD AUTO: 3.47 X10(6)UL (ref 3.8–5.3)
RBC # BLD AUTO: 3.84 X10(6)UL (ref 3.8–5.3)
RBC # BLD AUTO: 3.88 X10(6)UL (ref 3.8–5.3)
RBC # BLD AUTO: 3.97 X10(6)UL (ref 3.8–5.3)
RBC # BLD AUTO: 4.04 X10(6)UL (ref 3.8–5.3)
RBC # BLD AUTO: 4.13 X10(6)UL (ref 3.8–5.3)
RBC # BLD AUTO: 4.18 X10(6)UL (ref 3.8–5.3)
RBC # BLD AUTO: 4.31 X10(6)UL (ref 3.8–5.3)
RBC # BLD AUTO: 4.99 X10(6)UL (ref 3.8–5.3)
RBC #/AREA URNS AUTO: >10 /HPF
RH BLOOD TYPE: POSITIVE
SODIUM SERPL-SCNC: 132 MMOL/L (ref 136–145)
SODIUM SERPL-SCNC: 135 MMOL/L (ref 136–144)
SODIUM SERPL-SCNC: 135 MMOL/L (ref 136–145)
SODIUM SERPL-SCNC: 139 MMOL/L (ref 136–145)
SODIUM SERPL-SCNC: 140 MMOL/L (ref 136–145)
SODIUM SERPL-SCNC: 141 MMOL/L (ref 136–145)
SODIUM SERPL-SCNC: 29 MMOL/L
SP GR UR STRIP.AUTO: 1.01 (ref 1–1.03)
T AXIS: 77 DEGREES
T AXIS: 80 DEGREES
TOTAL IRON BINDING CAPACITY: 252 UG/DL (ref 240–450)
TOTAL IRON BINDING CAPACITY: 304 UG/DL (ref 240–450)
TOTAL IRON BINDING CAPACITY: 305 UG/DL (ref 240–450)
TOTAL IRON BINDING CAPACITY: 313 UG/DL (ref 240–450)
TRANSFERRIN SERPL-MCNC: 169 MG/DL (ref 200–360)
TRANSFERRIN SERPL-MCNC: 204 MG/DL (ref 200–360)
TRANSFERRIN SERPL-MCNC: 205 MG/DL (ref 200–360)
TRANSFERRIN SERPL-MCNC: 210 MG/DL (ref 200–360)
UROBILINOGEN UR STRIP.AUTO-MCNC: <2 MG/DL
VENTRICULAR RATE: 75 BPM
VENTRICULAR RATE: 90 BPM
WBC # BLD AUTO: 10.1 X10(3) UL (ref 4–11)
WBC # BLD AUTO: 12 X10(3) UL (ref 4–11)
WBC # BLD AUTO: 12.3 X10(3) UL (ref 4–11)
WBC # BLD AUTO: 12.5 X10(3) UL (ref 4–11)
WBC # BLD AUTO: 12.9 X10(3) UL (ref 4–11)
WBC # BLD AUTO: 13 X10(3) UL (ref 4–11)
WBC # BLD AUTO: 13.8 X10(3) UL (ref 4–11)
WBC # BLD AUTO: 14.1 X10(3) UL (ref 4–11)
WBC # BLD AUTO: 14.9 X10(3) UL (ref 4–11)
WBC # BLD AUTO: 8.2 X10(3) UL (ref 4–11)
WBC # BLD AUTO: 9 X10(3) UL (ref 4–11)
WBC #/AREA URNS AUTO: >50 /HPF
WBC CLUMPS UR QL AUTO: PRESENT

## 2019-01-01 PROCEDURE — 85025 COMPLETE CBC W/AUTO DIFF WBC: CPT

## 2019-01-01 PROCEDURE — 99232 SBSQ HOSP IP/OBS MODERATE 35: CPT | Performed by: HOSPITALIST

## 2019-01-01 PROCEDURE — 49424 ASSESS CYST CONTRAST INJECT: CPT | Performed by: INTERNAL MEDICINE

## 2019-01-01 PROCEDURE — 83540 ASSAY OF IRON: CPT

## 2019-01-01 PROCEDURE — 30233H1 TRANSFUSION OF NONAUTOLOGOUS WHOLE BLOOD INTO PERIPHERAL VEIN, PERCUTANEOUS APPROACH: ICD-10-PCS | Performed by: HOSPITALIST

## 2019-01-01 PROCEDURE — 96372 THER/PROPH/DIAG INJ SC/IM: CPT

## 2019-01-01 PROCEDURE — 96361 HYDRATE IV INFUSION ADD-ON: CPT

## 2019-01-01 PROCEDURE — 99214 OFFICE O/P EST MOD 30 MIN: CPT | Performed by: INTERNAL MEDICINE

## 2019-01-01 PROCEDURE — 83735 ASSAY OF MAGNESIUM: CPT

## 2019-01-01 PROCEDURE — 82728 ASSAY OF FERRITIN: CPT

## 2019-01-01 PROCEDURE — 99285 EMERGENCY DEPT VISIT HI MDM: CPT

## 2019-01-01 PROCEDURE — 0W2GX0Z CHANGE DRAINAGE DEVICE IN PERITONEAL CAVITY, EXTERNAL APPROACH: ICD-10-PCS | Performed by: RADIOLOGY

## 2019-01-01 PROCEDURE — 80053 COMPREHEN METABOLIC PANEL: CPT | Performed by: PHYSICIAN ASSISTANT

## 2019-01-01 PROCEDURE — 84100 ASSAY OF PHOSPHORUS: CPT

## 2019-01-01 PROCEDURE — 72192 CT PELVIS W/O DYE: CPT | Performed by: EMERGENCY MEDICINE

## 2019-01-01 PROCEDURE — 99223 1ST HOSP IP/OBS HIGH 75: CPT | Performed by: INTERNAL MEDICINE

## 2019-01-01 PROCEDURE — 99232 SBSQ HOSP IP/OBS MODERATE 35: CPT | Performed by: INTERNAL MEDICINE

## 2019-01-01 PROCEDURE — 99215 OFFICE O/P EST HI 40 MIN: CPT

## 2019-01-01 PROCEDURE — 93010 ELECTROCARDIOGRAM REPORT: CPT

## 2019-01-01 PROCEDURE — 99214 OFFICE O/P EST MOD 30 MIN: CPT | Performed by: NURSE PRACTITIONER

## 2019-01-01 PROCEDURE — 80053 COMPREHEN METABOLIC PANEL: CPT

## 2019-01-01 PROCEDURE — 85610 PROTHROMBIN TIME: CPT

## 2019-01-01 PROCEDURE — 96376 TX/PRO/DX INJ SAME DRUG ADON: CPT

## 2019-01-01 PROCEDURE — 02HV33Z INSERTION OF INFUSION DEVICE INTO SUPERIOR VENA CAVA, PERCUTANEOUS APPROACH: ICD-10-PCS | Performed by: RADIOLOGY

## 2019-01-01 PROCEDURE — 36415 COLL VENOUS BLD VENIPUNCTURE: CPT

## 2019-01-01 PROCEDURE — 76080 X-RAY EXAM OF FISTULA: CPT | Performed by: INTERNAL MEDICINE

## 2019-01-01 PROCEDURE — 80048 BASIC METABOLIC PNL TOTAL CA: CPT

## 2019-01-01 PROCEDURE — 96374 THER/PROPH/DIAG INJ IV PUSH: CPT

## 2019-01-01 PROCEDURE — 99215 OFFICE O/P EST HI 40 MIN: CPT | Performed by: NURSE PRACTITIONER

## 2019-01-01 PROCEDURE — 99215 OFFICE O/P EST HI 40 MIN: CPT | Performed by: CLINICAL NURSE SPECIALIST

## 2019-01-01 PROCEDURE — 85025 COMPLETE CBC W/AUTO DIFF WBC: CPT | Performed by: PHYSICIAN ASSISTANT

## 2019-01-01 PROCEDURE — 99223 1ST HOSP IP/OBS HIGH 75: CPT | Performed by: HOSPITALIST

## 2019-01-01 PROCEDURE — 30233N1 TRANSFUSION OF NONAUTOLOGOUS RED BLOOD CELLS INTO PERIPHERAL VEIN, PERCUTANEOUS APPROACH: ICD-10-PCS | Performed by: HOSPITALIST

## 2019-01-01 PROCEDURE — 96365 THER/PROPH/DIAG IV INF INIT: CPT

## 2019-01-01 PROCEDURE — 93005 ELECTROCARDIOGRAM TRACING: CPT

## 2019-01-01 PROCEDURE — 99239 HOSP IP/OBS DSCHRG MGMT >30: CPT | Performed by: HOSPITALIST

## 2019-01-01 PROCEDURE — 83550 IRON BINDING TEST: CPT

## 2019-01-01 PROCEDURE — 36573 INSJ PICC RS&I 5 YR+: CPT

## 2019-01-01 PROCEDURE — 85027 COMPLETE CBC AUTOMATED: CPT

## 2019-01-01 PROCEDURE — 99233 SBSQ HOSP IP/OBS HIGH 50: CPT | Performed by: INTERNAL MEDICINE

## 2019-01-01 PROCEDURE — 76770 US EXAM ABDO BACK WALL COMP: CPT | Performed by: INTERNAL MEDICINE

## 2019-01-01 PROCEDURE — 74176 CT ABD & PELVIS W/O CONTRAST: CPT | Performed by: EMERGENCY MEDICINE

## 2019-01-01 PROCEDURE — 49406 IMAGE CATH FLUID PERI/RETRO: CPT | Performed by: PHYSICIAN ASSISTANT

## 2019-01-01 RX ORDER — ACETAMINOPHEN 500 MG
500 TABLET ORAL EVERY 4 HOURS PRN
Status: DISCONTINUED | OUTPATIENT
Start: 2019-01-01 | End: 2019-01-01

## 2019-01-01 RX ORDER — ONDANSETRON 4 MG/1
4 TABLET, ORALLY DISINTEGRATING ORAL EVERY 8 HOURS PRN
COMMUNITY

## 2019-01-01 RX ORDER — FAMOTIDINE 10 MG/ML
20 INJECTION, SOLUTION INTRAVENOUS ONCE
Status: CANCELLED | OUTPATIENT
Start: 2019-01-01

## 2019-01-01 RX ORDER — DIPHENHYDRAMINE HYDROCHLORIDE 50 MG/ML
25 INJECTION INTRAMUSCULAR; INTRAVENOUS ONCE
Status: CANCELLED | OUTPATIENT
Start: 2019-01-01

## 2019-01-01 RX ORDER — MELATONIN
325
COMMUNITY

## 2019-01-01 RX ORDER — FENTANYL 25 UG/H
1 PATCH TRANSDERMAL
Qty: 10 PATCH | Refills: 0 | Status: SHIPPED | OUTPATIENT
Start: 2019-01-01 | End: 2019-01-01

## 2019-01-01 RX ORDER — POLYETHYLENE GLYCOL 3350 17 G/17G
17 POWDER, FOR SOLUTION ORAL DAILY PRN
Status: DISCONTINUED | OUTPATIENT
Start: 2019-01-01 | End: 2019-01-01

## 2019-01-01 RX ORDER — MEPERIDINE HYDROCHLORIDE 25 MG/ML
25 INJECTION INTRAMUSCULAR; INTRAVENOUS; SUBCUTANEOUS ONCE
Status: CANCELLED | OUTPATIENT
Start: 2019-01-01

## 2019-01-01 RX ORDER — MORPHINE SULFATE 100 MG/5ML
10 SOLUTION ORAL EVERY 4 HOURS PRN
Qty: 30 ML | Refills: 0 | Status: SHIPPED | OUTPATIENT
Start: 2019-01-01 | End: 2019-01-01

## 2019-01-01 RX ORDER — ALPRAZOLAM 0.25 MG/1
0.25 TABLET ORAL 2 TIMES DAILY PRN
Qty: 60 TABLET | Refills: 5 | Status: SHIPPED | OUTPATIENT
Start: 2019-01-01

## 2019-01-01 RX ORDER — MELATONIN
325
Status: DISCONTINUED | OUTPATIENT
Start: 2019-01-01 | End: 2019-01-01

## 2019-01-01 RX ORDER — TRAMADOL HYDROCHLORIDE 50 MG/1
25 TABLET ORAL EVERY 8 HOURS PRN
COMMUNITY

## 2019-01-01 RX ORDER — METOCLOPRAMIDE HYDROCHLORIDE 5 MG/ML
5 INJECTION INTRAMUSCULAR; INTRAVENOUS EVERY 8 HOURS PRN
Status: DISCONTINUED | OUTPATIENT
Start: 2019-01-01 | End: 2019-01-01

## 2019-01-01 RX ORDER — MIDAZOLAM HYDROCHLORIDE 1 MG/ML
1 INJECTION INTRAMUSCULAR; INTRAVENOUS EVERY 5 MIN PRN
Status: ACTIVE | OUTPATIENT
Start: 2019-01-01 | End: 2019-01-01

## 2019-01-01 RX ORDER — SODIUM CHLORIDE 9 MG/ML
INJECTION, SOLUTION INTRAVENOUS CONTINUOUS
Status: DISCONTINUED | OUTPATIENT
Start: 2019-01-01 | End: 2019-01-01

## 2019-01-01 RX ORDER — CLINDAMYCIN PHOSPHATE 150 MG/ML
INJECTION, SOLUTION INTRAVENOUS
Status: DISCONTINUED
Start: 2019-01-01 | End: 2019-01-01 | Stop reason: WASHOUT

## 2019-01-01 RX ORDER — SODIUM CHLORIDE 0.9 % (FLUSH) 0.9 %
10 SYRINGE (ML) INJECTION AS NEEDED
Status: DISCONTINUED | OUTPATIENT
Start: 2019-01-01 | End: 2019-01-01

## 2019-01-01 RX ORDER — HEPARIN SODIUM 5000 [USP'U]/ML
INJECTION, SOLUTION INTRAVENOUS; SUBCUTANEOUS
Status: COMPLETED
Start: 2019-01-01 | End: 2019-01-01

## 2019-01-01 RX ORDER — MIDAZOLAM HYDROCHLORIDE 1 MG/ML
INJECTION INTRAMUSCULAR; INTRAVENOUS
Status: DISCONTINUED
Start: 2019-01-01 | End: 2019-01-01 | Stop reason: WASHOUT

## 2019-01-01 RX ORDER — SODIUM CHLORIDE 9 MG/ML
INJECTION, SOLUTION INTRAVENOUS ONCE
Status: COMPLETED | OUTPATIENT
Start: 2019-01-01 | End: 2019-01-01

## 2019-01-01 RX ORDER — MIRTAZAPINE 7.5 MG/1
7.5 TABLET, FILM COATED ORAL NIGHTLY
COMMUNITY

## 2019-01-01 RX ORDER — FENTANYL 12 UG/H
1 PATCH TRANSDERMAL
Qty: 5 PATCH | Refills: 0 | Status: SHIPPED | OUTPATIENT
Start: 2019-01-01 | End: 2019-01-01

## 2019-01-01 RX ORDER — HEPARIN SODIUM 5000 [USP'U]/ML
5000 INJECTION, SOLUTION INTRAVENOUS; SUBCUTANEOUS EVERY 12 HOURS SCHEDULED
Status: DISCONTINUED | OUTPATIENT
Start: 2019-01-01 | End: 2019-01-01

## 2019-01-01 RX ORDER — LOPERAMIDE HYDROCHLORIDE 2 MG/1
2 CAPSULE ORAL AS NEEDED
COMMUNITY

## 2019-01-01 RX ORDER — PHENOL 1.4 %
10 AEROSOL, SPRAY (ML) MUCOUS MEMBRANE NIGHTLY PRN
COMMUNITY

## 2019-01-01 RX ORDER — FAMOTIDINE 10 MG/ML
20 INJECTION, SOLUTION INTRAVENOUS ONCE
Status: DISCONTINUED | OUTPATIENT
Start: 2019-01-01 | End: 2019-01-01

## 2019-01-01 RX ORDER — DIPHENHYDRAMINE HYDROCHLORIDE 50 MG/ML
25 INJECTION INTRAMUSCULAR; INTRAVENOUS ONCE
Status: DISCONTINUED | OUTPATIENT
Start: 2019-01-01 | End: 2019-01-01

## 2019-01-01 RX ORDER — AMITRIPTYLINE HYDROCHLORIDE 25 MG/1
25 TABLET, FILM COATED ORAL NIGHTLY
Qty: 30 TABLET | Refills: 3 | Status: SHIPPED | OUTPATIENT
Start: 2019-01-01 | End: 2019-01-01

## 2019-01-01 RX ORDER — FOLIC ACID 1 MG/1
1 TABLET ORAL DAILY
Qty: 30 TABLET | Refills: 1 | Status: SHIPPED | OUTPATIENT
Start: 2019-01-01

## 2019-01-01 RX ORDER — NALOXONE HYDROCHLORIDE 0.4 MG/ML
80 INJECTION, SOLUTION INTRAMUSCULAR; INTRAVENOUS; SUBCUTANEOUS AS NEEDED
Status: DISCONTINUED | OUTPATIENT
Start: 2019-01-01 | End: 2019-01-01

## 2019-01-01 RX ORDER — MIRTAZAPINE 15 MG/1
7.5 TABLET, FILM COATED ORAL NIGHTLY
Status: DISCONTINUED | OUTPATIENT
Start: 2019-01-01 | End: 2019-01-01

## 2019-01-01 RX ORDER — AMITRIPTYLINE HYDROCHLORIDE 25 MG/1
25 TABLET, FILM COATED ORAL NIGHTLY
Status: DISCONTINUED | OUTPATIENT
Start: 2019-01-01 | End: 2019-01-01

## 2019-01-01 RX ORDER — IPRATROPIUM BROMIDE AND ALBUTEROL SULFATE 2.5; .5 MG/3ML; MG/3ML
3 SOLUTION RESPIRATORY (INHALATION) EVERY 6 HOURS PRN
COMMUNITY

## 2019-01-01 RX ORDER — MORPHINE SULFATE 15 MG/1
15 TABLET, FILM COATED, EXTENDED RELEASE ORAL EVERY 12 HOURS SCHEDULED
Qty: 60 TABLET | Refills: 0 | Status: SHIPPED | OUTPATIENT
Start: 2019-01-01 | End: 2019-01-01

## 2019-01-01 RX ORDER — MORPHINE SULFATE 20 MG/ML
10 SOLUTION ORAL EVERY 4 HOURS PRN
Status: DISCONTINUED | OUTPATIENT
Start: 2019-01-01 | End: 2019-01-01

## 2019-01-01 RX ORDER — GABAPENTIN 100 MG/1
100 CAPSULE ORAL 3 TIMES DAILY
Qty: 90 CAPSULE | Refills: 5 | Status: SHIPPED | OUTPATIENT
Start: 2019-01-01 | End: 2019-01-01

## 2019-01-01 RX ORDER — LIDOCAINE AND PRILOCAINE 25; 25 MG/G; MG/G
CREAM TOPICAL
Qty: 30 G | Refills: 5 | Status: SHIPPED | OUTPATIENT
Start: 2019-01-01 | End: 2019-01-01

## 2019-01-01 RX ORDER — MEPERIDINE HYDROCHLORIDE 25 MG/ML
25 INJECTION INTRAMUSCULAR; INTRAVENOUS; SUBCUTANEOUS ONCE
Status: DISCONTINUED | OUTPATIENT
Start: 2019-01-01 | End: 2019-01-01

## 2019-01-01 RX ORDER — ACETAMINOPHEN 325 MG/1
650 TABLET ORAL EVERY 6 HOURS PRN
Status: DISCONTINUED | OUTPATIENT
Start: 2019-01-01 | End: 2019-01-01

## 2019-01-01 RX ORDER — SODIUM CHLORIDE 0.9 % (FLUSH) 0.9 %
20 SYRINGE (ML) INJECTION AS NEEDED
Status: DISCONTINUED | OUTPATIENT
Start: 2019-01-01 | End: 2019-01-01

## 2019-01-01 RX ORDER — FLUOXETINE 10 MG/1
10 CAPSULE ORAL DAILY
COMMUNITY

## 2019-01-01 RX ORDER — SODIUM BICARBONATE 325 MG/1
1300 TABLET ORAL 3 TIMES DAILY
Status: DISCONTINUED | OUTPATIENT
Start: 2019-01-01 | End: 2019-01-01

## 2019-01-01 RX ORDER — ALPRAZOLAM 0.25 MG/1
0.25 TABLET ORAL 2 TIMES DAILY PRN
Status: DISCONTINUED | OUTPATIENT
Start: 2019-01-01 | End: 2019-01-01

## 2019-01-01 RX ORDER — LORAZEPAM 0.5 MG/1
0.5 TABLET ORAL EVERY 6 HOURS PRN
Qty: 30 TABLET | Refills: 0 | Status: SHIPPED | OUTPATIENT
Start: 2019-01-01 | End: 2019-01-01

## 2019-01-01 RX ORDER — MORPHINE SULFATE 20 MG/ML
SOLUTION ORAL
Refills: 0 | COMMUNITY
Start: 2019-01-13 | End: 2019-01-01

## 2019-01-01 RX ORDER — OMEPRAZOLE 20 MG/1
20 CAPSULE, DELAYED RELEASE ORAL 2 TIMES DAILY
COMMUNITY

## 2019-01-01 RX ORDER — BISACODYL 10 MG
10 SUPPOSITORY, RECTAL RECTAL
Status: DISCONTINUED | OUTPATIENT
Start: 2019-01-01 | End: 2019-01-01

## 2019-01-01 RX ORDER — TRAMADOL HYDROCHLORIDE 50 MG/1
25 TABLET ORAL EVERY 12 HOURS PRN
Status: DISCONTINUED | OUTPATIENT
Start: 2019-01-01 | End: 2019-01-01

## 2019-01-01 RX ORDER — ONDANSETRON 2 MG/ML
4 INJECTION INTRAMUSCULAR; INTRAVENOUS EVERY 6 HOURS PRN
Status: DISCONTINUED | OUTPATIENT
Start: 2019-01-01 | End: 2019-01-01

## 2019-01-01 RX ORDER — ACETAMINOPHEN 500 MG
500 TABLET ORAL EVERY 4 HOURS PRN
COMMUNITY

## 2019-01-01 RX ORDER — SODIUM POLYSTYRENE SULFONATE 4.1 MEQ/G
15 POWDER, FOR SUSPENSION ORAL; RECTAL ONCE
Status: COMPLETED | OUTPATIENT
Start: 2019-01-01 | End: 2019-01-01

## 2019-01-01 RX ORDER — ALPRAZOLAM 0.25 MG/1
0.25 TABLET ORAL 2 TIMES DAILY PRN
Qty: 60 TABLET | Refills: 5 | Status: ON HOLD | OUTPATIENT
Start: 2019-01-01 | End: 2019-01-01

## 2019-01-01 RX ORDER — MAGNESIUM SULFATE HEPTAHYDRATE 40 MG/ML
2 INJECTION, SOLUTION INTRAVENOUS ONCE
Status: COMPLETED | OUTPATIENT
Start: 2019-01-01 | End: 2019-01-01

## 2019-01-01 RX ORDER — LIDOCAINE HYDROCHLORIDE 10 MG/ML
INJECTION, SOLUTION INFILTRATION; PERINEURAL
Status: COMPLETED
Start: 2019-01-01 | End: 2019-01-01

## 2019-01-01 RX ORDER — MELATONIN
3 EVERY EVENING
Status: DISCONTINUED | OUTPATIENT
Start: 2019-01-01 | End: 2019-01-01

## 2019-01-01 RX ORDER — METHYLPREDNISOLONE SODIUM SUCCINATE 40 MG/ML
40 INJECTION, POWDER, LYOPHILIZED, FOR SOLUTION INTRAMUSCULAR; INTRAVENOUS ONCE
Status: DISCONTINUED | OUTPATIENT
Start: 2019-01-01 | End: 2019-01-01

## 2019-01-01 RX ORDER — FUROSEMIDE 10 MG/ML
20 INJECTION INTRAMUSCULAR; INTRAVENOUS ONCE
Status: DISCONTINUED | OUTPATIENT
Start: 2019-01-01 | End: 2019-01-01

## 2019-01-01 RX ORDER — FENTANYL 25 UG/H
1 PATCH TRANSDERMAL
Status: DISCONTINUED | OUTPATIENT
Start: 2019-01-01 | End: 2019-01-01

## 2019-01-01 RX ORDER — ROPINIROLE 0.5 MG/1
0.5 TABLET, FILM COATED ORAL EVERY EVENING
Status: DISCONTINUED | OUTPATIENT
Start: 2019-01-01 | End: 2019-01-01

## 2019-01-01 RX ORDER — DEXTROSE MONOHYDRATE 25 G/50ML
50 INJECTION, SOLUTION INTRAVENOUS ONCE
Status: COMPLETED | OUTPATIENT
Start: 2019-01-01 | End: 2019-01-01

## 2019-01-01 RX ORDER — SODIUM CHLORIDE 9 MG/ML
INJECTION, SOLUTION INTRAVENOUS CONTINUOUS
Status: CANCELLED | OUTPATIENT
Start: 2019-01-01 | End: 2019-01-01

## 2019-01-01 RX ORDER — METHYLPREDNISOLONE SODIUM SUCCINATE 125 MG/2ML
125 INJECTION, POWDER, LYOPHILIZED, FOR SOLUTION INTRAMUSCULAR; INTRAVENOUS ONCE
Status: DISCONTINUED | OUTPATIENT
Start: 2019-01-01 | End: 2019-01-01

## 2019-01-01 RX ORDER — FOLIC ACID 1 MG/1
1 TABLET ORAL DAILY
Status: DISCONTINUED | OUTPATIENT
Start: 2019-01-01 | End: 2019-01-01

## 2019-01-01 RX ORDER — PEPPERMINT OIL
30 OIL (ML) MISCELLANEOUS AS NEEDED
Status: DISCONTINUED | OUTPATIENT
Start: 2019-01-01 | End: 2019-01-01

## 2019-01-01 RX ORDER — DOCUSATE SODIUM 100 MG/1
100 CAPSULE, LIQUID FILLED ORAL 2 TIMES DAILY
Status: DISCONTINUED | OUTPATIENT
Start: 2019-01-01 | End: 2019-01-01

## 2019-01-01 RX ORDER — SODIUM POLYSTYRENE SULFONATE 4.1 MEQ/G
15 POWDER, FOR SUSPENSION ORAL; RECTAL ONCE
Status: ON HOLD | COMMUNITY
End: 2019-01-01

## 2019-01-01 RX ORDER — FLUMAZENIL 0.1 MG/ML
0.2 INJECTION, SOLUTION INTRAVENOUS AS NEEDED
Status: DISCONTINUED | OUTPATIENT
Start: 2019-01-01 | End: 2019-01-01

## 2019-01-01 RX ORDER — MORPHINE SULFATE 100 MG/5ML
10 SOLUTION ORAL EVERY 4 HOURS PRN
Qty: 30 ML | Refills: 0 | Status: SHIPPED | OUTPATIENT
Start: 2019-01-01

## 2019-01-01 RX ORDER — MORPHINE SULFATE 100 MG/5ML
10 SOLUTION ORAL EVERY 4 HOURS PRN
Qty: 30 ML | Refills: 0 | Status: ON HOLD | OUTPATIENT
Start: 2019-01-01 | End: 2019-01-01

## 2019-01-01 RX ORDER — METHYLPREDNISOLONE SODIUM SUCCINATE 40 MG/ML
40 INJECTION, POWDER, LYOPHILIZED, FOR SOLUTION INTRAMUSCULAR; INTRAVENOUS ONCE
Status: CANCELLED | OUTPATIENT
Start: 2019-01-01

## 2019-01-01 RX ORDER — FENTANYL 12 UG/H
1 PATCH TRANSDERMAL
COMMUNITY
End: 2019-01-01

## 2019-01-01 RX ORDER — SODIUM BICARBONATE 325 MG/1
650 TABLET ORAL 2 TIMES DAILY
Status: DISCONTINUED | OUTPATIENT
Start: 2019-01-01 | End: 2019-01-01

## 2019-01-01 RX ORDER — FLUOXETINE 10 MG/1
10 TABLET, FILM COATED ORAL DAILY
Status: DISCONTINUED | OUTPATIENT
Start: 2019-01-01 | End: 2019-01-01

## 2019-01-01 RX ORDER — ROPINIROLE 0.25 MG/1
0.25 TABLET, FILM COATED ORAL NIGHTLY
COMMUNITY

## 2019-01-01 RX ORDER — METHYLPREDNISOLONE SODIUM SUCCINATE 125 MG/2ML
125 INJECTION, POWDER, LYOPHILIZED, FOR SOLUTION INTRAMUSCULAR; INTRAVENOUS ONCE
Status: CANCELLED | OUTPATIENT
Start: 2019-01-01

## 2019-01-01 RX ORDER — SODIUM CHLORIDE 0.9 % (FLUSH) 0.9 %
10 SYRINGE (ML) INJECTION AS NEEDED
Status: ACTIVE | OUTPATIENT
Start: 2019-01-01

## 2019-01-01 RX ORDER — IPRATROPIUM BROMIDE AND ALBUTEROL SULFATE 2.5; .5 MG/3ML; MG/3ML
3 SOLUTION RESPIRATORY (INHALATION) EVERY 6 HOURS PRN
Status: DISCONTINUED | OUTPATIENT
Start: 2019-01-01 | End: 2019-01-01

## 2019-01-01 NOTE — ED NOTES
Call to the [address] at [facility], spoke with ST ORTIZ, who was caring for patient. States patient has become increasingly paranoid. Reported to staff call light wasn't working so call light was replaced and staff verified was working.   When staff wa

## 2019-01-01 NOTE — CM/SW NOTE
Updated ANOOP Conway on all of the below contact this RN has had with [facility] and will further update her once hear back from Myriam with their final decision. Zoraida rudd.

## 2019-01-01 NOTE — BH LEVEL OF CARE ASSESSMENT
Level of Care Assessment Note    General Questions  Why are you here?: Pt states 'I was treated very badly and I was scared because I cant walk and no one coming to assist me, the call light was broken, the phone in my room was not working and I kept nicci Pt is her own POA.  Per pt's RN (Nadyne Closs) at [facility], Pt's call light was fixed (it was broken previously) her TV was fixed and she was assured that someone will be always available for assistance but pt was upset, she was paranoid that she would be neglected, w Treatment Center/Nursing Home  Organization Name: [facility]  Person/Contact Name: RN:  Guera August  Phone: [phone]    Suicide Risk  Source of information for CSSR: Patient  In what setting is the screener performed?: in person  1.  Hav LBS  IBW %: 93.83 %  IBW + 10%: 137.5 LBS  IBW - 10%: 112.5 LBS                                                                       Geriatric Functioning  Dementia Symptoms Observed/Expressed: No    Current/Previous MH/CD Providers  Hospitalizations, Haley mood  Range of Affect: Normal  Stability of Affect: Stable  Attitude toward staff: Co-operative;Pleasant  Speech  Rate of Speech: Appropriate  Flow of Speech: Appropriate  Intensity of Volume: Ordinary  Clarity: Clear  Cognition  Concentration: Unimpaired go back to the NH and wants to Tx out to another nursing home.  Pt was made aware there is no  on duty at [address] [place] [address] and she will have to stay all night in the ER before she could talk to a  tomorrow (1/1/19) and get set up wit

## 2019-01-01 NOTE — ED PROVIDER NOTES
Patient Seen in: [facility] Emergency Department    History   Patient presents with:  Eval-P (psychiatric)    Stated Complaint: eval-p    HPI    The patient is a 59-year-old female with a history of a pelvic adenocarcinoma who was recently discharge (laser assisted in situ keratomileusis) 7/2/2014   • Sjogren's syndrome ([facility]    • Stroke ([facility]     tia   • Thalassemia minor    • THALLASEMIA    • Unspecified essential hypertension    • Unspecified sleep apnea psg 8/5/13 TX 8-27-13    AHI 52/ supine 101/ 12/31/18 1815 Temporal   SpO2 12/31/18 1815 96 %   O2 Device 12/31/18 1811 None (Room air)       Current:BP (!) 190/85   Pulse 80   Temp 97.1 °F (36.2 °C) (Temporal)   Resp 16   Ht 165.1 cm (5' 5\")   Wt 53.2 kg   SpO2 98%   BMI 19.52 kg/m²         Physica All other components within normal limits   URINALYSIS WITH CULTURE REFLEX - Abnormal; Notable for the following components:    Blood Urine Small (*)     Leukocyte Esterase Urine Large (*)     WBC Urine >50 (*)     RBC URINE >10 (*)     Mucous Urine 1+ (*) unremarkable. The patient was given Ultram and morphine for chronic pain which she has taken previously with improvement.   The patient was seen and evaluated by [facility]  who discussed the case with with the patient as well as t

## 2019-01-01 NOTE — ED NOTES
Spoke with Ethel, ,  from [facility] with this PT's situation. PT to choose from list of places and this RN will inform Ethel with PT's selection.

## 2019-01-01 NOTE — ED NOTES
Spoke with someone from [facility], PT will most likely be admitted there. Brief report given. Their admissions will be back in the morning (0800) and will provide more information.

## 2019-01-01 NOTE — CM/SW NOTE
Saeid Hollingsworth, Supervisor at [facility] called this RN back and stated they will most likely be able to take patient in the am, however this RN will need to fax pertinent medical records to JERMAIN ANDREINA [facility] [phone] and she will review them and call this RN back if sh

## 2019-01-01 NOTE — CM/SW NOTE
Emmy RN at [facility] calls this RN and states she just spoke with Jazmin and patient is not accepted. This RN called Esmer Galvez and informed her of all that has transpired since this RN began working on patient's case @ 2157.   Per Jazmin she will have [facility] call this

## 2019-01-01 NOTE — CM/SW NOTE
Rec'd a call from [facility] requesting assistance if possible in getting patient to an alternative SNF as patient is refusing to return to The [address] [place] [address].   Faxed Whitley perferred SNF list to [facility] and informed ANOOP Conway to have patient pi

## 2019-01-01 NOTE — CM/SW NOTE
Tucker Mahajan, Admissions from [facility] called this RN and they have accepted patient - called Emmy RN @ [facility] and informed her of the above and to call Ramon Al Supervisor @ [phone] to give report.   Emmy will call this RN once report has been ca

## 2019-01-01 NOTE — ED NOTES
All of the patients belongings were removed  They are bagged/labeled and put into 2 smart lock bags  Bag #1 #[id number]  This bag includes   #sweatshirt   #sweatpants   #socks   #bra   #scarf   #headband   #purse   #baseball cap  Bag #2 #[id number]  This bag

## 2019-01-01 NOTE — CM/SW NOTE
Emmy called this RN and she gave report to [address] [place] [address] at [facility] without any issues and [employer] is arranging transportation to [facility] at this time.

## 2019-01-01 NOTE — ED NOTES
PT will be placed at [facility] after 0800. Jazmin, supervisor will be able to take report after 0800. The number to call for report is [phone]. PT was informed of this information.

## 2019-01-01 NOTE — CM/SW NOTE
Called and spoke with Justin Franco, Supervisor @ [facility] @ [phone] and informed her of patients request to be discharged to an alternative SNF.   Per Justin Franco she will speak with her Admissions Director, also provided Justin Franco with ANOOP Conway's phone number @ [facility]

## 2019-01-01 NOTE — ED NOTES
PT's first choice is [facility], second choice is [facility]. Ethel at [facility] aware and will call back with further information.

## 2019-01-01 NOTE — CM/SW NOTE
Alok Sandoval 93 multiple times and kept getting disconnected, was finally able to get through to [facility] - this RN asked to speak with Shannon Daily in Admissions, per Della Llanes is not yet in, however per ANOOP Reilly ok for patient to come to [facility]

## 2019-01-01 NOTE — DISCHARGE SUMMARY
General Medicine Discharge Summary     Patient ID:  Ludmila Heredia  80year old  7/28/1934    Admit date: 12/27/2018    Discharge date and time: 12/30/2018  6:30 PM     Attending Physician: Raheem Randolph ORTHOPEDIC SURGERY  IP CONSULT TO SOCIAL WORK    Operative Procedures:        Patient instructions:      Discharge Medication List as of 12/30/2018  5:41 PM    START taking these medications    TraMADol HCl 50 MG Oral Tab  Take 1 tablet (50 mg total) by mo (PRESERVISION AREDS) Oral Tab  Take 2 tablets by mouth daily. , Historical    !! Multiple Vitamins-Minerals (MULTIVITAMIN ADULTS OR)  Take 1 tablet by mouth daily. , Historical    folic acid 1 MG Oral Tab  Take 1 mg by mouth daily.   , Historical    !! - Po

## 2019-01-01 NOTE — ED NOTES
Report given to [facility], admissions supervisor at [facility]. Will call this RN back if pt is accepted.

## 2019-01-01 NOTE — CM/SW NOTE
Cody Mckeon Supervisor from [facility] called back and informed this RN after reviewing medical records, they will accept patient, however not until after 0800 in am as this writer still needs to call Christian Mcconnell in Admissions @ [phone] - per Cleve Cruz number for

## 2019-01-01 NOTE — CM/SW NOTE
Spoke with Vin Hartley in charge of Admissions at [facility] he had specific questions re: patient that [facility] RN needs to answer - gave Yenni Andujar number at [facility]. Per Vin Hartley he will call this RN back to inform if patient accepted or not.

## 2019-01-01 NOTE — ED NOTES
Rounded on pt. Updated on POC. Not in any distress. Resting on cart. Seclusion discontinued. Will give report to [facility] and arrange transfer at 0800.

## 2019-01-01 NOTE — ED INITIAL ASSESSMENT (HPI)
Arrives from the [facility] for a psych eval after was found to be increasingly paranoid.

## 2019-01-03 ENCOUNTER — TELEPHONE (OUTPATIENT)
Dept: HEMATOLOGY/ONCOLOGY | Facility: HOSPITAL | Age: 84
End: 2019-01-03

## 2019-01-03 ENCOUNTER — SNF VISIT (OUTPATIENT)
Dept: INTERNAL MEDICINE CLINIC | Age: 84
End: 2019-01-03

## 2019-01-03 DIAGNOSIS — Z79.899 MEDICATION MANAGEMENT: ICD-10-CM

## 2019-01-03 DIAGNOSIS — M25.551 RIGHT HIP PAIN: ICD-10-CM

## 2019-01-03 DIAGNOSIS — R53.81 PHYSICAL DECONDITIONING: Primary | ICD-10-CM

## 2019-01-03 DIAGNOSIS — Z76.0 PRESCRIPTION REFILL: ICD-10-CM

## 2019-01-03 PROCEDURE — 99310 SBSQ NF CARE HIGH MDM 45: CPT | Performed by: NURSE PRACTITIONER

## 2019-01-04 NOTE — PROGRESS NOTES
Gerry Meigs  : 1934  Age 80year old  female patient is admitted to Facility: [facility] for [address] [place] [address] date:  18  Discharge date to [facility]:  18  ELOS:  7-10 days  Anticipated discharge date:  18  Ben Lazaro patient previously had fracture of the superior and inferior pubic ramus, demonstrated on a previous CT scan.   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 Disp: 30 tablet Rfl: 0   ALPRAZolam 1 MG Oral Tab Take 1 tablet (1 mg total) by mouth 4 (four) times daily as needed for Anxiety.  Disp: 20 tablet Rfl: 0   Saline Nasal S3, no S4;   ABDOMEN: active BS+, soft, nondistended; no visible masses; nontender, no guarding; stoma with bag adherent/no leaks  :Deferred  MUSCULOSKELETAL: weakness r/t recent hospitalization/diagnoses/sequelae; will undergo therapies to rehab and imp 12/31/2018    BAABSO 0.01 12/31/2018       [facility] records, notes, lab and imaging results reviewed. Medication reconciliation completed.         Assessment and Plan:  Physical Deconditioning/Weakness  PT/OT/ST to evaluate and treat  Anticipated/goa

## 2019-01-05 ENCOUNTER — DOCUMENTATION ONLY (OUTPATIENT)
Dept: INTERNAL MEDICINE CLINIC | Age: 84
End: 2019-01-05

## 2019-01-05 NOTE — PROGRESS NOTES
Ayla Valeriano  : 1934  Age 80year old  female patient is admitted to [facility] for AI fro R hip pains,pelvic fx's. Chief complaint:   She urgently wishes to leave to go home today. [facility]  Pharmacy inicated xanax allergy.  This is false Sjogren's syndrome ([facility]    • Stroke ([facility]     tia   • Thalassemia minor    • THALLASEMIA    • Unspecified essential hypertension    • Unspecified sleep apnea psg 8/5/13 TX 8-27-13    AHI 52/ supine 101/ sao2 88%  CPAP 11 [facility]   • Visual impairment    • use: No      ALLERGIES:    Ativan [Lorazepam]      CONFUSION    Comment:Increased anxiety and confusion  Aspirin Cr [Aspirin]    BLEEDING  Ciprofloxacin               Comment:Itching  Flagyl [Metronidazo*        Comment:Itching  Msg [Monosodium Glu* improved from in-pt. Below baseline of GFR ~25. Redo ordreed 1 wk. Affective  d/o= highly     HTN= exacerbation likely more related to agitation over impulse to leave for home . GERD= on rx. No acute exacerbation. Thalasemia minor= longstanding.

## 2019-01-31 NOTE — TELEPHONE ENCOUNTER
Patient enrolled in hospice a few weeks ago, ordered by PCP. RN followed up with patient earlier this week. Patient is feeling much better this week now that her pain is better managed with Fentanyl patch.  She is under the impression that she can continue

## 2019-02-01 NOTE — PROGRESS NOTES
Per Dr Nestor Rodriguez. Continue with home health for rectal drain flushing. Cheyenne Munson, RN @ [facility] notified. [phone].

## 2019-02-01 NOTE — PROGRESS NOTES
Patient is here for MD f/u rectal cancer. Patient has been on hospice for a few weeks. She states she feels well and is not sure why she should be on hospice. Pain is better controlled now that she is on Fentanyl patch 12 mcg.  Patient has a lot of vaginal

## 2019-02-04 NOTE — TELEPHONE ENCOUNTER
MD Braulio Cunha, RN; CORTNEY Mccabe   Cc: Francisco Javier Redmond, RN; JAKI Matson Psrs;  Mio Guillen MD   Caller: Unspecified (Today,  1:57 PM)             Placing home health order.  Needs appt at [facility] for Thursday to get cbc, C

## 2019-02-04 NOTE — TELEPHONE ENCOUNTER
Hospice nurse called stating they are revoking hospice, but wants to know what the f/u plan will be. Let her know pt was just seen on 2/1 and that she has upcoming appts.  RN states she will need a home health order placed as she needs help with ostomy care

## 2019-02-05 NOTE — PROGRESS NOTES
[facility]    PATIENT'S NAME: Maria Fernanda Sanderson [place] A   ATTENDING PHYSICIAN: Rikki Weber M.D.    PATIENT ACCOUNT #: [de-identified] LOCATION: [address] [place] [address] RECORD #: [id number] YOB: 1934   DATE OF SERVICE: 02/01/2019       CANCER another nursing facility, and she did not like either one of these. She is home and feels as though she is not getting any more support from hospice than she would have had from home health. She still has a drain in place.   She continues to complain of s measures would help her function better. She was receiving erythropoietin injections with us. It was keeping her hemoglobin closer to 10, and there were some times she did not receive the injection.   She does not know what her hemoglobin is, but she feel

## 2019-02-14 NOTE — TELEPHONE ENCOUNTER
Patient called to report she was to have a home health visit on Tuesday from [facility]. No one showed up. She is very upset because she has been in pain and no longer has any pain patches.  Offered patient to come in today and to  a Rx

## 2019-02-25 NOTE — TELEPHONE ENCOUNTER
[facility] RN called stating pt's rectal drain has dislodged. The stitch is out and it has pulled out several centimeters. Pt states it is not draining as much as normal, but pt does not measure it so she is not sure how much less.  I called back and left a KERRY spears

## 2019-02-26 NOTE — PROCEDURES
[facility]  Pre-Procedure Note    Name: Mohaumd Whitley  MRN#: [id number]  : 1934    Procedure:  CT abscessogram    Indication: Obstructed rectal sigmoid stump.  Drain possibly pulled out    Allergies:      Ativan [Lorazepam]      CONFUSION

## 2019-02-26 NOTE — PROCEDURES
[address] Patient Status:  Outpatient    1934 MRN [id number]   [facility] Attending Joel Mcgee MD   Hosp Day # 0 PCP Geetha Wolf MD         Brief Procedure Report    Pre-Operative Diagnosis: Ob

## 2019-02-26 NOTE — TELEPHONE ENCOUNTER
Talked to Adina Kahn RN in Floor64 today. Pt Daysi added on today at 1600 for STAT CT abcess-o-gram for rectal drain. Instructions given to pt to be NPO starting at noon today.  Pt aware that she needs to be in outpatient registration [facility]) at 1500 fo

## 2019-02-26 NOTE — IMAGING NOTE
Pt arrived for abscessogram possible drain replacement. Drain in place. New suture applied per Dr. Moe Shah and new leg bag and Sorbaview applied. Per Dr. Moe Shah no need for follow-up drain check from his stand point.

## 2019-02-27 NOTE — TELEPHONE ENCOUNTER
Medical social worker was unable to see patient this week. Osvaldo Machado RN is asking for a verbal order to see patient next week. Informed Jovani, patient needs a visit asap. He informed me patient will be made high priority for early next week. Verbal ok given.

## 2019-03-07 NOTE — PROGRESS NOTES
Patient is here for MD f/u for rectal cancer. Patient c/o increase rectal drainage. Increase pain in rectum. Rectal drain in place. On Fentanyl patch 12 mcg and some relief. Feeling more fatigued and she is sleeping more.  Seeing ortho for chronic hip probl

## 2019-03-08 NOTE — TELEPHONE ENCOUNTER
Radha Sood RN calling. She saw patient today and visits twice per week to flush rectal drain. Per pt, drain has been giving normal output, pain is present but unchanged. Small amount of leaking around tube- this was re-sutured last week.  She has no fev

## 2019-03-12 NOTE — PROGRESS NOTES
[facility]    PATIENT'S NAME: Noam Samaniegoarabella [place] A   ATTENDING PHYSICIAN: Tracy Villalta M.D.    PATIENT ACCOUNT #: [de-identified] LOCATION: [address] [place] [address] RECORD #: [id number] YOB: 1934   DATE OF SERVICE: 03/07/2019       CANCER time.  She is still living at home. She, at one point, signed into hospice but did not realize that she would not be getting her erythropoietin routinely if this was the case. Now, she has signed back out of hospice.   She has home health with our goal be is 10%. Her ferritin is high. IMPRESSION:  Rectal cancer. It is a difficult situation that she has an incurable rectal cancer with local disease but no distant metastatic disease.   She has had obstruction of her rectal stump and as a result, has a dante

## 2019-03-13 NOTE — PROGRESS NOTES
Education Record  Learner:  Patient  Disease / Diagnosis:   ANNA  Barriers / Limitations:  None  Method:  Printed material and Reinforcement  General Topics:  Plan of care reviewed; side effects; infusion procedure; schedule  Outcome:  Shows understanding a

## 2019-03-18 NOTE — TELEPHONE ENCOUNTER
Marion Jacobo RN called asking for extension on RN care [facility]. Asking to be called back. Social work called RN back gave verbal for extension of RN and aid care. RN told SW that she is worried pt will pass away all alone and wanted MD to be aware.

## 2019-03-22 PROBLEM — T19.2XXA FOREIGN BODY IN VAGINA: Status: ACTIVE | Noted: 2019-01-01

## 2019-03-22 PROBLEM — T19.2XXA FOREIGN BODY IN VAGINA, INITIAL ENCOUNTER: Status: ACTIVE | Noted: 2019-01-01

## 2019-03-22 PROBLEM — R73.9 HYPERGLYCEMIA: Status: ACTIVE | Noted: 2019-01-01

## 2019-03-22 NOTE — PROGRESS NOTES
[facility] Pharmacy Note:  Renal Dose Adjustment for Metoclopramide (REGLAN)    Lazarus Sloan has been prescribed Metoclopramide (REGLAN) 10 mg every 8 hours as needed for n/v.    Estimated Creatinine Clearance: 8.9 mL/min (A) (based on SCr of 3.52 mg/dL (H

## 2019-03-22 NOTE — PROGRESS NOTES
[facility] Visit Note    Patient Name: Fareed Finnegan   YOB: 1934   Medical Record Number: [id number]   CSN: [id number]   Date of visit: 3/21/2019       Chief Complaint/Reason for Visit:  Rectal Cancer, Anemia of CKD      History of Present Illn (ARF) ([address]     Acute kidney injury ([address]     Metabolic acidosis     Bone lesion     Chronic kidney disease, unspecified CKD stage     Foreign body in vagina     Hyperglycemia     Foreign body in vagina, initial encounter       Medical History:  Past Medic Performed by Stefan Arambula MD at [address] [place] [address] Road 5/25/2017    Performed by Terrance Lockhart MD at [facility] ENDOSCOPY   • ILEOSTOMY/JEJUNOSTOMY,NONTUBE     • OTHER SURGICAL HISTORY      colon resection x 2   • OTHER SURGICAL HI quittin.2      Smokeless tobacco: Never Used    Substance and Sexual Activity      Alcohol use:  Yes        Alcohol/week: 0.0 oz        Comment: 1 glass of wine a month      Drug use: No      Sexual activity: Yes        Partners: Male    Lifestyle she lives in [place] - alone. She still walks and tried ballroom dancing. She is in the chorus. She loves pinalannah pong. Re , lives in [address] [place] [address].  has lung cancer, done with treatment for now. He is getting dementia.        She d Ref range: 70 - 99 mg/dL      Status: Final  Sodium                                        Date: 03/21/2019  Value: 135*        Ref range: 136 - 145 mmol/L   Status: Final  Potassium                                     Date: 03/21/2019  Value: 4.7 Status: Final  Bilirubin, Total                              Date: 03/21/2019  Value: 0.7         Ref range: 0.1 - 2.0 mg/dL    Status: Final  Total Protein                                 Date: 03/21/2019  Value: 8.2         Ref range: 6.4 - 8.2 g/dL Absolute Prelim                    Date: 03/21/2019  Value: 10.61*      Ref range: 1.50 - 7.70 x10 *  Status: Final  Neutrophil Absolute                           Date: 03/21/2019  Value: 10.61*      Ref range: 1.50 - 7.70 x10(*  Status: Final  Lymphocyte Date: 03/21/2019  Value: See morphology below                     Ref range: Normal, Slide re*  Status: Final  Platelet Morphology                           Date: 03/21/2019  Value: Normal      Ref range: Normal             Status: Final  Hypochromia distress, coping difficulties and social support systems and currently there are no active problems.     Planned Follow Up: 2 weeks     Risk Level: High: Rectal Cancer     Electronically Signed by:    Helder Chandra ANP-BC  Nurse Practitioner  Daniel Moody

## 2019-03-22 NOTE — ED PROVIDER NOTES
Patient Seen in: [facility] Emergency Department In [place]    History   Patient presents with:  Eval-G (genital)    Stated Complaint: Eval g- Tampon stuck    HPI    Patient is an 66-year-old woman with multiple medical problems including rectal cancer, r COLECTOMY     • COLONOSCOPY      6/09 no dysplasia.   Repeat 2014   • COLONOSCOPY N/A 5/13/2014    Performed by Carter Lopez MD at [facility] ENDOSCOPY   • D & C     • EXPLORATORY LAPAROTOMY N/A 9/26/2014    Performed by Sajan Barrett MD at [address] [place] [address] Pediatric speculum was used. 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 Mucoid drainage without clear tampon.   Extremities: Clubbing/cyanosis/edema    ED Course     Labs Reviewed   CBC W/ DIFFERENTIAL - Abnormal; Notable for the following compo

## 2019-03-22 NOTE — H&P
[facility] Hospitalist History and Physical      Patient presents with:  Mauricio-STELLA (genital)       PCP: Trung Almanzar MD      History of Present Illness: Patient is a 80year old female with PMH sig for pelvic mucinous adenocarcinoma s/p chemo/RT, ho recto vaginal f EXPLORATORY LAPAROTOMY N/A 9/26/2014    Performed by Yolanda Brewer MD at [facility] N/A 8/23/2018    Performed by Tamiko Macias MD at [facility] 8/21/2018    Performed by Mona Richardson MD a OBJECTIVE:  /62   Pulse 91   Temp 98.4 °F (36.9 °C) (Temporal)   Resp 18   Wt 105 lb (47.6 kg)   SpO2 100%   BMI 17.47 kg/m²   General:  Alert, no distress, appears stated age. Head:  Normocephalic, without obvious abnormality, atraumatic.    E JAMIL , CT DRAIN ABSCESS RETROPERITONEAL (CPT=49406), 8/27/2018, 12:09. EDWARD , MRI PELVIS (SOFT TISSUE) (CPT=72195), 7/05/2018, 14:26. JAMIL , CT ABSCESS INJECTION VIA EXISTING CATH (CPT=76080/90780), 2/26/2019, 16:16.   INDICATIONS:  Eval g- Tampon s fracture. LUNG BASES:  No visible pulmonary or pleural disease. OTHER:  Negative. CONCLUSION:   1. No evidence of radiopaque foreign body or tampon within the vagina.    2. Stable appearance to decompression of the Samy's pouch status post perc includes the Dose Index Registry. PATIENT STATED HISTORY:(As transcribed by Technologist)  Patient had her rectal drain examined.    CONTRAST USED:  1cc of Omnipaque 350  FINDINGS:  The noncontrast CT images demonstrate the catheter to lie in good position pain  - cont home meds, she should NOT be driving. This was dw w pt    CKD4  - cr near baseline    Chronic anemia  - Hg near baseline      Outpatient records or previous hospital records reviewed.    [facility] hospitalist to continue to follow patient while in [facility]

## 2019-03-22 NOTE — TELEPHONE ENCOUNTER
[facility] that was sending patient to [facility] ER. Patient told the nurse that she inserted a tampon in her vagina yesterday evening around 5 pm. Patient is unable to find it and neither is the nurse.  The nurse attempted to help pat

## 2019-03-22 NOTE — PROGRESS NOTES
NURSING ADMISSION NOTE      Patient admitted via Cart  Oriented to room. Safety precautions initiated. Bed in low position. Call light in reach.   Alert and oriented  x4 says she was admitted from [facility] but she came from [address]  Paged and

## 2019-03-23 NOTE — PROGRESS NOTES
[facility]    Progress Note    Dolores Calvillo Patient Status:  Inpatient    1934 MRN [id number]   [facility] 4NW-A Attending Lev Arroyo MD   Hosp Day # 0 PCP Trung Almanzar MD     Subjective:  Dolores Calvillo i right     Iron deficiency anemia due to chronic blood loss     Intractable pain     Nausea     Stage 4 chronic kidney disease (HCC)     Acidosis     Hyponatremia     Urinary retention     Hydronephrosis, unspecified hydronephrosis type     Anemia, chronic or perineum pain, bleeding, fevers.       Merary Hinojosa  3/22/2019  9:46 PM

## 2019-03-23 NOTE — PLAN OF CARE
Pt is alert and oriented x 4. Vitals stable. C/o pian in the Rectum, offered Morphine but pt declined as she needs to drive to go home. Tylenol given. Notified  about potassium of 5.6, orders received for Kayexelate.  Discharge instruction give

## 2019-03-23 NOTE — HOME CARE LIAISON
PTNT CURRENT WITH [facility] FOR SN/PT/SW  EOE 4/10/19 WILL NEED A ATUL ORDER ON OR BEFORE DC    THANKS  Regino Peñaloza

## 2019-03-23 NOTE — PROGRESS NOTES
[facility] Hospitalist Progress Note                                                                   [address]  7/28/1934    SUBJECTIVE:  Doing ok. Denies fevers.       OBJECTIVE:  Temp Foreign body in vagina    Hyperglycemia      Lost tampon  - CT abd did not show it.  could not tolerate CT w rectal contrast.  Pt declined flex sig despite known risks of retained tampon     Chronic pain  - cont home meds, she should NOT be driving.  This w

## 2019-03-23 NOTE — CONSULTS
Hematology/Oncology Initial Consultation Note    Patient Name: Yossi Gaxiola  Medical Record Number: [id number]    YOB: 1934   Date of Consultation: 3/23/2019   Physician requesting consultation: Dr. 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   • Renal disorder     ckd   • S/P LASIK (laser assisted in situ keratomileusis) 7/2/2014   • Sjogren's syndrome ([facility]    • Stroke ([facility]     tia injection 1 mg 1 mg Intravenous Q5 Min PRN Mortimer Moots, MD    [] fentaNYL citrate (SUBLIMAZE) 0.05 MG/ML injection 50 mcg 50 mcg Intravenous Q5 Min PRN Mortimer Moots, MD    [COMPLETED] iohexol (OMNIPAQUE) 350 MG/ML injection 1 mL 1 mL Intr Medications:  Inpatient Meds:  • docusate sodium  100 mg Oral BID   • Amitriptyline HCl  25 mg Oral Nightly   • fentaNYL  1 patch Transdermal Q72H   • melatonin  3 mg Oral QPM   • sodium bicarbonate  1,300 mg Oral TID         PRN Meds:  acetaminophen, PEG month    Drug use: No      Family Medical History:  Family History   Problem Relation Age of Onset   • Cancer Sister         breast cancer   • Stroke Sister    • Heart Attack Sister    • Neurological Disorder Sister         dementia/organic brain syndrome CO2  15.0*  13.0*   BUN  65*  69*   CREATSERUM  3.59*  3.52*   GFRAA  13*  13*   GFRNAA  11*  11*   GLU  137*  91   CA  10.5*  10.2*   PHOS  3.5   --    TP  8.2  7.8   ALB  3.2*  3.0*   ALKPHO  111  106   AST  7*   --    ALT  13  16   BILT  0.7  0.6 *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 Tanja Wolfe. Not a candidate for surgery, chemo or further RT, has received palliative radiation in past. Ongoing supportive care only at this time.      *possible r

## 2019-03-23 NOTE — PROGRESS NOTES
A&O times 4. Vitals stable. Pt unable to tolerate rectal contract for ct scan. MD notified. MD spoke to patient about options, pt refusing. No complaints overnight.   Oncology consult will see in am.

## 2019-04-04 NOTE — PROGRESS NOTES
Patient is here for MD f/u for anemia and rectal cancer. Patient was hospitalized 2 weeks ago after inserting a tampon to control vaginal drainage. She has a rectal drain in place. Energy level is low. Not much of an appetite.  Chronic pain and burning in r

## 2019-04-08 NOTE — PROGRESS NOTES
[facility]  Progress Note      Bossman Vasquez Patient Status:  Inpatient    1934 MRN [id number]   [facility] 4NW-A Attending No att. providers found   [address] Day # 1 PCP Yun Jarrell MD     Spoke with Dr. Liliana Rosales regarding marjorie

## 2019-04-10 NOTE — PROCEDURES
[address] Patient Status:  Outpatient    1934 MRN [id number]   [facility] Attending Joel Mcgee MD   Hosp Day # 0 PCP Geetha Wolf MD         Brief Procedure Report    Pre-Operative Diagnosis: Re

## 2019-04-15 NOTE — TELEPHONE ENCOUNTER
[place] Nurse called and stated that during her recent visits, the pt has been complaining of increased pain in rectal area. Pt continues on a Fentanyl patch, but she has been using old liquid morphine script for break through pain.  Nurse is concerned that the

## 2019-04-16 NOTE — TELEPHONE ENCOUNTER
Left  message for patient to see if she would be able to change her appointment this week to Friday so she could meet with Connie Velasquez for pain management. Asked her to call back.

## 2019-04-16 NOTE — TELEPHONE ENCOUNTER
Pt left  stating she was returning Dr. Streeter March call. She has made an appointment with Dr. Catherine Adam for 5/1.  Pt would like to talk with MD.

## 2019-04-19 PROBLEM — Z71.89 ADVANCED CARE PLANNING/COUNSELING DISCUSSION: Status: ACTIVE | Noted: 2019-01-01

## 2019-04-19 PROBLEM — Z51.5 PALLIATIVE CARE BY SPECIALIST: Status: ACTIVE | Noted: 2019-01-01

## 2019-04-19 NOTE — PROGRESS NOTES
Palliative Care Consult Note     Patient Name: Yossi Gaxiola   YOB: 1934   Medical Record Number: [id number]   CSN: [id number]   Date of visit: 4/19/2019     Reason for Consult:  Symptom management and goals of care    Chief Complaint/Re chronic kidney disease (HCC)     Acidosis     Hyponatremia     Urinary retention     Hydronephrosis, unspecified hydronephrosis type     Anemia, chronic disease     CKD (chronic kidney disease) stage 3, GFR 30-59 ml/min ([facility])     Samy pouch obstruction • COLECTOMY     • COLONOSCOPY      6/09 no dysplasia.   Repeat 2014   • COLONOSCOPY N/A 5/13/2014    Performed by Dayami Menjivar MD at [facility] ENDOSCOPY   • D & C     • EXPLORATORY LAPAROTOMY N/A 9/26/2014    Performed by Geeta Valdes MD at [facility] MAIN OR   • FLE Years: 25.00        Pack years: 25        Types: Cigarettes        Quit date: 1975        Years since quittin.2      Smokeless tobacco: Never Used    Substance and Sexual Activity      Alcohol use:  Yes        Alcohol/week: 0.0 oz        Comme (MULTIVITAMIN ADULTS OR) Take 1 tablet by mouth daily. Disp:  Rfl:    folic acid 1 MG Oral Tab Take 1 mg by mouth daily. Disp:  Rfl:        Review of Systems:  General:  Fatigue. Feels well.     Respiratory:  Denies SOB, denies cough  Cardiac:  Denies counseling and coordination of care for goals of care. 30 total minutes spent with patient >50% of visit was spent in counseling and coordination of care for symptom management.     Electronically Signed by:  CORTNEY Valle   [facility]

## 2019-05-02 NOTE — PROGRESS NOTES
Patient is here for MD f/u for rectal cancer. Patient states appetite and energy level is good. Pain in rectum has been well controlled until today. Patient forgot to put Fentanyl pain patch on. Rectal drain in place. Due for Procrit today.          Carmella Shelton

## 2019-05-03 NOTE — PROGRESS NOTES
[facility]    PATIENT'S NAME: Jarad Medina [place] KOBE   ATTENDING PHYSICIAN: Christian Quinones M.D.    PATIENT ACCOUNT #: [de-identified] LOCATION: [address] [place] [address] RECORD #: [id number] YOB: 1934   DATE OF SERVICE: 05/02/2019       CANCER fevers or chills, sweats, night sweats, cough, or shortness of breath. She is in better spirits than usual today. She actually forgot to put her fentanyl patch on today, and she is going to replace it when she gets home. Her appetite has improved.   Her Rectal cancer. There is no plan currently to treat her as she was intolerant of even oral capecitabine. She still has this drain in place.   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

## 2019-05-07 NOTE — TELEPHONE ENCOUNTER
Patient called requesting to see ostomy nurse for hernia belt options. Pt will make appointment in outpatient wound clinic for next week.

## 2019-05-15 NOTE — PROGRESS NOTES
[facility]  Report of Outpatient Ostomy Consultation    Kathleen Pastor Patient Status:  Wound Series    1934 MRN [id number]   Location [facility] Attending Ramona Sanchez MD     History of Present Illness:  Haseeb ESQUIVEL N/A 8/23/2018    Performed by Tamiko Macias MD at [address] 8/21/2018    Performed by Mona Richardson MD at [address] 5/25/2017    Performed by Tamiko Macias MD at [facility] ENDOSCO ruperto and will call for f/u appt if needed. Thank you for allowing me to participate in the care of your patient. Time [id number], Thank you.     Bob Aguiar RN, BSN, [facility]  Wound/Ostomy care pager [phone]  5/15/2019

## 2019-05-16 NOTE — PATIENT INSTRUCTIONS
To reach Dr Allyssa Tom nurse during business hours, please call [phone]. After hours, including weekends, evenings, and holidays, please call the main number [phone] for emergent needs.

## 2019-05-24 NOTE — PROGRESS NOTES
Palliative Care Follow Up Note     Patient Name: Ayla Bal   YOB: 1934   Medical Record Number: [id number]   CSN: [id number]   Date of visit: 5/23/2019     Chief Complaint/Reason for Visit:  Palliative follow up     History of Lul Hydronephrosis, unspecified hydronephrosis type     Anemia, chronic disease     CKD (chronic kidney disease) stage 3, GFR 30-59 ml/min (HCC)     Samy pouch obstruction     Obstructive uropathy     Anemia     Acute renal failure (ARF) (HCC)     Acute ki COLONOSCOPY N/A 5/13/2014    Performed by Adan Bumpers, MD at [facility] ENDOSCOPY   • D & C     • EXPLORATORY LAPAROTOMY N/A 9/26/2014    Performed by Rivera Coleman MD at [address] [facility] [address]   • [address] N/A 8/23/2018    Performed by Severo Posey, MD at Quit date: 1975        Years since quittin.3      Smokeless tobacco: Never Used    Substance and Sexual Activity      Alcohol use:  Yes        Alcohol/week: 0.0 oz        Comment: 1 glass of wine a month      Drug use: No      Sexual activity: Yes folic acid 1 MG Oral Tab Take 1 mg by mouth daily. Disp:  Rfl:        Review of Systems:  General:  Fatigue. Feels well.     Respiratory:  Denies SOB, denies cough  Cardiac:  Denies chest pain, heart palpitations  Abdomen:  Denies constipation, diarrhe

## 2019-05-28 NOTE — TELEPHONE ENCOUNTER
Spoke with Hayley Ramos RN with [facility] and clarified medications instructions for xanax, MS IR, Fentanyl patch and gabapentin per CHARLEY Szymanski most recent progress note.

## 2019-05-30 NOTE — PROGRESS NOTES
Pt here for 1 month MD f/u. Pt using Fentanyl patch and Morphine at night. Energy level has been fair, has been doing a lot more, but has been dealing with stoma issues. Appetite has been good.  Pt notes losing blood through stoma and rectum since last nigh

## 2019-06-03 NOTE — PROGRESS NOTES
[facility]    PATIENT'S NAME: Isabel [place] KOBE   ATTENDING PHYSICIAN: Leatha Galicia M.D.    PATIENT ACCOUNT #: [de-identified] LOCATION: [address] [place] [address] RECORD #: [id number] YOB: 1934   DATE OF SERVICE: 05/30/2019       CANCER palliative care nurse practitioner alternating with me at 2-week to 4-week intervals. She has ongoing anemia, which is contributed to partially by iron deficiency in the past but also by her chronic kidney disease.   She is receiving erythropoietin to try place.  I am only going to have her have this removed in order to give her more freedom from irritation from the tube. She is continuing with home health nursing to evaluate the tube.   If she does have a rectovaginal fistula, she will likely start to drai

## 2019-06-13 NOTE — PROGRESS NOTES
Palliative Care Follow Up Note     Patient Name: Jordana Huffman   YOB: 1934   Medical Record Number: [id number]   CSN: [id number]   Date of visit: 5/23/2019     Chief Complaint/Reason for Visit:  Palliative follow up for pain     History Hyponatremia     Urinary retention     Hydronephrosis, unspecified hydronephrosis type     Anemia, chronic disease     CKD (chronic kidney disease) stage 3, GFR 30-59 ml/min ([facility])     Samy pouch obstruction     Obstructive uropathy     Anemia     Acut no dysplasia.   Repeat 2014   • COLONOSCOPY N/A 5/13/2014    Performed by Radha Sheets MD at [facility] ENDOSCOPY   • D & C     • EXPLORATORY LAPAROTOMY N/A 9/26/2014    Performed by Adina Duque MD at [facility] MAIN OR   • [address] N/A 8/23/2018    Perform Types: Cigarettes        Quit date: 1975        Years since quittin.4      Smokeless tobacco: Never Used    Substance and Sexual Activity      Alcohol use:  Yes        Alcohol/week: 0.0 oz        Comment: 1 glass of wine a month      Drug use: N mouth daily. Disp:  Rfl:    folic acid 1 MG Oral Tab Take 1 mg by mouth daily. Disp:  Rfl:        Review of Systems:  General:  Fatigue. Feels well.     Respiratory:  Denies SOB, denies cough  Cardiac:  Denies chest pain, heart palpitations  Abdomen:

## 2019-06-14 NOTE — TELEPHONE ENCOUNTER
Rectal drain stitch is out according to Janki Flaherty, RN with [facility]. IR can accommodate pt next Tuesday to re-stitch. Spoke with pt who is not wanting to go through with re-stitch. Would would like to discuss with MD about removing drain.      Okay per Dr. Sheeba Thrasher t

## 2019-06-27 NOTE — PROGRESS NOTES
Education Record    Learner:  Patient    Disease / Diagnosis:    Barriers / Limitations:  None   Comments:    Method:  Brief focused   Comments:    General Topics:  Plan of care reviewed   Comments:    Outcome:  Shows understanding   Comments:

## 2019-06-27 NOTE — PROGRESS NOTES
MD f/u for rectal ca. Continues on liquid morphine for rectal pain. She is seeing palliative APN for pain mangement. Drain is still intact, but not stitched in. Home health RN still coming out for assessments.      Education Record    Learner:  Patient

## 2019-06-28 NOTE — TELEPHONE ENCOUNTER
Spoke with pharmacist- pt was dispensed liquid morphine at the concentration of 20 mg/5 ml. Pt has been taking dispensed script at sub-therapeutic dose. Original script ordered for 20mg/1ml and pt was instructed to take 0.5 ml (10 mg) q 6 hours.      Pharm

## 2019-06-28 NOTE — TELEPHONE ENCOUNTER
Vivian Sung MD  P Edw Dano Caballero Rns             Let's bring her in for one dose of feraheme - not iron dextran   Jabari      Attempted to reach pt but NA. Left Vm requesting pt to call back regarding labs and to set up an iron infusion.

## 2019-07-01 NOTE — TELEPHONE ENCOUNTER
Spoke with pt- she is aware of MD orders for iron infusion. PSR will add pt for 1pm this Wednesday in [facility]. Pt also asking to clarify pain medication. States she is taking MS ER every 12 hours with adequate pain relief.  States she is confused with how

## 2019-07-02 NOTE — PROGRESS NOTES
[facility]    PATIENT'S NAME: Lester Gonzales, [place] A   ATTENDING PHYSICIAN: Thad Alba M.D.    PATIENT ACCOUNT #: [de-identified] LOCATION: [address] [place] [address] RECORD #: [id number] YOB: 1934   DATE OF SERVICE: 06/27/2019       CANCER seeing her. She is being followed by our palliative care nurse as well and we are tag teaming our visits with her seeing me at 2-week intervals. I last saw her about a month ago.   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 If it is not, we would have to replace the drain. 2.   Anemia.   She

## 2019-07-08 NOTE — TELEPHONE ENCOUNTER
[facility] RN called stating that she believes pt is becoming more confused and not able to manage her medications well. RN states that her pill box looks like meds were just thrown in.  RN went through medications and pills box with pt.  Pt has two doses of morp

## 2019-07-12 NOTE — TELEPHONE ENCOUNTER
Spoke with Roberto Bosch, [facility] RN. She has been seeing patient 2X weekly for about 6 months. She has noticed a decline in ability for patient to care for self and manage her meds. She has many vitamins and old meds she has been taking.   Patient is ha

## 2019-07-12 NOTE — PROGRESS NOTES
Palliative Care Follow Up Note     Patient Name: Gerry Meigs   YOB: 1934   Medical Record Number: [id number]   CSN: [id number]   Date of visit: 7/12/2019     Chief Complaint/Reason for Visit:  Palliative follow up for pain and goals hydronephrosis type     Anemia, chronic disease     CKD (chronic kidney disease) stage 3, GFR 30-59 ml/min (HCC)     Samy pouch obstruction     Obstructive uropathy     Anemia     Acute renal failure (ARF) ([address]     Acute kidney injury ([address]     [place] Performed by John Giles MD at [facility] ENDOSCOPY   • D & C     • EXPLORATORY LAPAROTOMY N/A 9/26/2014    Performed by Chrystal Beckham MD at [address] 8/23/2018    Performed by Cas Ryan MD at [address] [facility] Years since quittin.5      Smokeless tobacco: Never Used    Substance and Sexual Activity      Alcohol use:  Yes        Alcohol/week: 0.0 oz        Comment: 1 glass of wine a month      Drug use: No      Sexual activity: Yes        Partners: Male    Re auscultation. Cardiac: Regular rate and rhythm. No edema  Abdomen: Soft, non tender with good bowel sounds. Musculoskeletal: Normal gait. Walks without assistive device.   Psych: Mood/Affect appropriate    Advanced Directives Discussed and Completed:   H
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
## 2019-07-25 NOTE — PROGRESS NOTES
Patient is here for MD f/u. Due for Procrit today. Patient c/o feeling weaker and fatigued. She is having pain in her rectum and occasionally in her abdomen. She has taken Morphine but doesn't like the way it makes her feel. Rectal drain in place.  Patient

## 2019-07-25 NOTE — PROGRESS NOTES
CHERRY met with patient and discussed her living arrangements and that she needs help and is willling to consider options. CHERRY made referral to Ethel at [address] [place] [address] for Mom. Patient has lived in same house for 30 years.   She has resources for assisted living but do

## 2019-07-29 NOTE — TELEPHONE ENCOUNTER
Vanessa Greene from [facility] called looking for orders to continue [address] and an update on the office visit last week because Viola told her she doesn't remember anything from the appt.  Patient again was found mixing medications in her pill boxes and she has not been abl

## 2019-07-30 NOTE — PROGRESS NOTES
[facility]    PATIENT'S NAME: Scott Hoskins   ATTENDING PHYSICIAN: Tyler Williamson M.D.    PATIENT ACCOUNT #: [de-identified] LOCATION: [address] [place] [address] RECORD #: [id number] YOB: 1934   DATE OF SERVICE: 07/25/2019       CANCER situation. She is unable to do most of her activities of daily living at this point. She admits to needing help at home, but she is unsure what she should do. She brought in most of her medications and we reviewed them again.   Her pain is present, but a hemoglobin 9.2, platelets are 348. Her iron saturation is 15%. Her BUN and creatinine are higher at 46 and 3.27. Potassium is normal.      IMPRESSION:  Rectal cancer. Most of her issues are social.  She was seen by her .   She is referring

## 2019-07-31 NOTE — PROGRESS NOTES
SW received email from Sada Williamson at [facility] for Mom. She states that patient does not think assisted living or 24 hour care is affordable for her. Ethel to check back with patient again after she has thought about it some more.

## 2019-08-06 NOTE — PROGRESS NOTES
CHERRY learned from [facility] that patient is currently hospitalized at [facility].  [facility] as well as [facility] CM has recommended assisted living or SNF for patient.   CHERRY and Palliative Care  NP, Sheba, were scheduled to meet with patient today whe

## 2019-08-28 PROBLEM — N18.9 ACUTE RENAL FAILURE SUPERIMPOSED ON CHRONIC KIDNEY DISEASE, UNSPECIFIED CKD STAGE, UNSPECIFIED ACUTE RENAL FAILURE TYPE (HCC): Status: ACTIVE | Noted: 2019-01-01

## 2019-08-28 PROBLEM — N17.9 ACUTE RENAL FAILURE SUPERIMPOSED ON CHRONIC KIDNEY DISEASE, UNSPECIFIED CKD STAGE, UNSPECIFIED ACUTE RENAL FAILURE TYPE: Status: ACTIVE | Noted: 2019-01-01

## 2019-08-28 PROBLEM — N18.9 ACUTE RENAL FAILURE SUPERIMPOSED ON CHRONIC KIDNEY DISEASE, UNSPECIFIED CKD STAGE, UNSPECIFIED ACUTE RENAL FAILURE TYPE: Status: ACTIVE | Noted: 2019-01-01

## 2019-08-28 PROBLEM — N17.9 ACUTE RENAL FAILURE SUPERIMPOSED ON CHRONIC KIDNEY DISEASE, UNSPECIFIED CKD STAGE, UNSPECIFIED ACUTE RENAL FAILURE TYPE (HCC): Status: ACTIVE | Noted: 2019-01-01

## 2019-08-28 NOTE — ED INITIAL ASSESSMENT (HPI)
PT from [facility]. PT with known K level of 6.1 today. PT took one sip of medication and the medication was thrown out. PT c/o weakness.

## 2019-08-29 NOTE — CONSULTS
[facility]  Report of Inpatient Wound Care Consultation     Rik Mukherjee Patient Status:  Inpatient    1934 MRN [id number]   [facility] 8NE-A 9022/0249-T [facility] Day [facility] 1 PCP [facility] Unspecified sleep apnea psg 8/5/13 TX 8-27-13    AHI 52/ supine 101/ sao2 88%  CPAP 11 [employer]   • Visual impairment    • Vulvar cancer [facility])      Past Surgical History:   Procedure Laterality Date   • APPENDECTOMY     • APPENDECTOMY     • COLECTOMY     • Acquired (best known): 3 years ago  Is the patient's wound a direct result of an accident?   No    Wound Condition: Chronic  Wound Status: Not Healed    Wound Measurements:    Length (cm): 0.5 cm  Width (cm): 0.5cm   Depth (cm): unable to assess due to drai instructions on caring for drain    Thank you for this consultation and for allowing me to participate in the care of your patient.   Please call me at the Inpatient Wound Care pager at #[phone] if you have any questions about this consultation and plan of car

## 2019-08-29 NOTE — PROGRESS NOTES
08/28/19 2254 08/28/19 2256 08/28/19 2258   Vital Signs   /60 121/52 99/54   BP Location Right arm Right arm Right arm   BP Method Automatic Automatic Automatic   Patient Position Lying Sitting Standing      Patient is orthostatic positive, asympt

## 2019-08-29 NOTE — PROGRESS NOTES
[facility]  Progress Note     Carrie Sprain Patient Status:  Inpatient    1934 MRN [id number]   [facility] 8NE-A [facility]CP Gabi Chavez     Chief Complaint: Weakness    S: Imaging data reviewed in Epic.     Medications:   • melatonin  5 mg Oral Nightly   • sodium bicarbonate  650 mg Oral BID   • magnesium sulfate  2 g Intravenous Once   • cycloSPORINE  1 drop Both Eyes Q12H   • ferrous sulfate  325 mg Oral Daily with breakfas

## 2019-08-29 NOTE — PROGRESS NOTES
[facility] Pharmacy Note:  Renal Dose Adjustment for Metoclopramide (REGLAN)    Daysi Tompkins has been prescribed Metoclopramide (REGLAN) 10 mg every 8 hours as needed for nausea/vomiting.     Estimated Creatinine Clearance: 6.3 mL/min (A) (based on SCr of 4

## 2019-08-29 NOTE — PROGRESS NOTES
Patient newly admitted to floor with hyperkalemia. Dextrose 5% with sodium bicarb infusing @ 125mL/hr. VSS. A&Ox4, NSR/ST on tele. Lung sounds are diminished upon auscultation. Patient up with 1x walker, set up & cues needed.  Generalized weakness, using be

## 2019-08-29 NOTE — CM/SW NOTE
08/29/19 1000   CM/SW Referral Data   Referral Source Social Work (self-referral)   Reason for Referral Discharge planning   Informant Patient; Children   Social History   Recreational Drug/Alcohol Use n   Major Changes Last 6 Months n   Domestic/Partner

## 2019-08-29 NOTE — PROGRESS NOTES
Pharmacy Note:  Renal Dose Adjustment of tramadol (Tisha Saul)         Gonzalez Rhoades is a 80year old female who has been prescribed tramadol 25mg q8hr prn.       Est CrCl: <20 mL/min    The dose has been adjusted to tramadol 25mg q12hr prn per hospital r

## 2019-08-29 NOTE — CONSULTS
[facility]  Report of Consultation    Kip Abo Patient Status:  Inpatient    1934 MRN [id number]   [facility] 8NE-A Attending [facility] PCP Mylene Miles / Plan:    1) transfusion    • IBS (irritable bowel syndrome)    • Insomnia    • KIDNEY STONE    • Macular degeneration    • Migraines     as a child   • Osteoarthrosis, unspecified whether generalized or localized, unspecified site    • OSTEOPENIA    • Paget's disease Sister 79   • Cancer Brother         basal cell cancer on scalp and ear   • Other (angina[other]) Mother            • Heart Disorder Mother    • Diabetes Father    • Hypertension Father    • Hypertension Sister      Denies family history of kidney 10 mg, Rectal, Daily PRN  •  ondansetron HCl (ZOFRAN) injection 4 mg, 4 mg, Intravenous, Q6H PRN  •  Metoclopramide HCl (REGLAN) injection 5 mg, 5 mg, Intravenous, Q8H PRN  •  peppermint oil liquid 30 mL, 30 mL, Other, PRN    Facility-Administered Medicati 7.7 08/28/2019    AST 6 08/28/2019    ALT 12 08/28/2019    MG 1.3 08/29/2019    PGLU 83 08/28/2019       Imaging: All imaging studies reviewed. Thank you for allowing me to participate in the care of your patient.     Amari Corey  8/29/2019  11:27 A

## 2019-08-29 NOTE — PLAN OF CARE
Assumed pt care around 0730. Pt denies CP, SOB, dizziness, or lightheadedness. Pt c/o pain in sacrum from drain- pt education provided on pain management and pt turning. Pt verbalized understanding. Wound care to see this afternoon. Pt up w/ 1 and walker. pt frequently for physical needs  - Identify cognitive and physical deficits and behaviors that affect risk of falls.   - [place] fall precautions as indicated by assessment.  - Educate pt/family on patient safety including physical limitations  - Instruc

## 2019-08-29 NOTE — BH PROGRESS NOTE
[facility] Resource Referral Counselor Note    Medardo Wilhelm Patient Status:  Inpatient    1934 MRN [id number]   [facility] 8NE-A Attending [facility] Day # 1 PCP Mei ALTAMIRANO(subjectiv

## 2019-08-29 NOTE — ED PROVIDER NOTES
Patient Seen in: [facility] 8ne-a    History   Patient presents with:  Potassium Problem    Stated Complaint: Hyperkalemia    HPI    77-year-old female with history of Crohn's disease, hypertension, kidney stones sent to the emergency room from nurs Procedure Laterality Date   • APPENDECTOMY     • APPENDECTOMY     • COLECTOMY     • COLONOSCOPY      6/09 no dysplasia.   Repeat 2014   • COLONOSCOPY N/A 5/13/2014    Performed by Oni Quinones MD at [place] [address] Appears ill  HEENT: Sclerae anicteric. Conjunctivae show no pallor. Oropharynx clear. Dry mucous membranes. Neck: supple, no rigidity   Lungs: good air exchange and clear   Heart: regular rate rhythm and no murmur   Abdomen: Soft and nontender.   No abd orders.    BASIC METABOLIC PANEL (8)   MAGNESIUM   URINALYSIS WITH CULTURE REFLEX   SODIUM, URINE, RANDOM   CREATININE, URINE, RANDOM   RAINBOW DRAW BLUE   RAINBOW DRAW LAVENDER   RAINBOW DRAW LIGHT GREEN   RAINBOW DRAW GOLD     EKG    Rate, intervals and a unspecified acute renal failure type ([facility] N17.9, N18.9 8/67/2717     Metabolic acidosis U98.7 06/01/4012     Weakness generalized R53.1 10/20/2017

## 2019-08-30 NOTE — CM/SW NOTE
Pt accepted at [facility], [facility] but is accepted there.      sw following

## 2019-08-30 NOTE — CM/SW NOTE
Pt accepted back [facility].  sw following for DC planning

## 2019-08-30 NOTE — PROGRESS NOTES
[facility]  Progress Note     Real Frost Patient Status:  Inpatient    1934 MRN [id number]   [facility] 8NE-A Attending Shivam [address]rano[address] [place] [address] [place] Day # 2 PCP Shannon Innocent     Chief Complaint: Weakness    S: input(s): PTP, INR in the last 168 hours. No results for input(s): TROP, CK in the last 168 hours. Imaging: Imaging data reviewed in Epic.     Medications:   • melatonin  5 mg Oral Nightly   • sodium bicarbonate  650 mg Oral BID   • cycloSPORINE

## 2019-08-30 NOTE — PROCEDURES
[facility]  Pre-Procedure Note    Name: Medardo Wilhelm  MRN#: [id number]  : 1934    Procedure:  CT guided abscess drain exchange    Indication: Recurrent distended Hartmanns pouch.      Allergies:      Ativan [Lorazepam]      CONFUSION    C

## 2019-08-30 NOTE — CM/SW NOTE
georgiana met with pt regarding discussion with son yesterday. She continues to refuse to return to [facility] trace. She would like to have referrals made to the [facility] referrals made; referrals pending.  georgiana asked pt to call her son to further discuss he

## 2019-08-30 NOTE — PROCEDURES
[address] Patient Status:  Inpatient    1934 MRN [id number]   [facility] 8NE-A Attending [facility] Day # 2 PCP Elzbieta Wright, [address]         Brief Procedure Report    Pre-Operative Diagnos

## 2019-08-30 NOTE — PLAN OF CARE
RN SHIFT NOTE    Assumed care of pt at 1100. Pt is alert and oriented x 4. Pt is ST on tele, S1 and S2 present and pt denies cardiac symptoms. Lung sounds clear/ diminished bilaterally. Abdomen soft and round. Pt has an ileostomy that is C/D/I.  No complica

## 2019-08-30 NOTE — DIETARY MALNUTRITION NOTE
[facility]     NUTRITION ASSESSMENT     Pt meets severe malnutrition criteria.     CRITERIA FOR MALNUTRITION DIAGNOSIS:  Criteria for severe malnutrition diagnosis: chronic illness related to wt loss greater than 7.5% in 3 months, energy intake less t 3.2 oz)  03/21/19 : 48.1 kg (106 lb)  02/01/19 : 49.9 kg (110 lb)  01/09/19 : 49.9 kg (110 lb)  08/16/18 : 53.2 kg (117 lb 3.2 oz)    NUTRITION:  Diet: General  Oral Supplements: Magic cup (chocolate)     FOOD/NUTRITION RELATED HISTORY:  Appetite: Poor  In

## 2019-08-30 NOTE — OCCUPATIONAL THERAPY NOTE
OCCUPATIONAL THERAPY EVALUATION - INPATIENT     Room Number: 6919/5415-S  Evaluation Date: 8/30/2019  Type of Evaluation: Initial  Presenting Problem: hyperkalemia    Physician Order: IP Consult to Occupational Therapy  Reason for Therapy: ADL/IADL Dysfunc • THALLASEMIA    • Unspecified essential hypertension    • Unspecified sleep apnea psg 8/5/13 TX 8-27-13    AHI 52/ supine 101/ sao2 88%  CPAP 11 Andres   • Visual impairment    • Vulvar cancer [facility])        Past Surgical History  Past Surgical History: Impaired  Safety Judgement:  decreased awareness of need for safety  Awareness of Errors:  decreased awareness of errors   Awareness of Deficits:  decreased awareness of deficits  Problem Solving:  assistance required to generate solutions and assistance r and weight shift off her buttocks. Informed RN about HR. Educated the pt about care plan. Verbalized understanding. Patient End of Session: In bed;Needs met;Call light within reach;RN aware of session/findings; All patient questions and concerns addressed training;Equipment eval/education  Rehab Potential : Fair  Frequency (Obs): 5x/week  Number of Visits to Meet Established Goals: 5    ADL Goals   Patient will perform grooming: with setup  Patient will perform upper body dressing:  with setup  Patient will

## 2019-08-30 NOTE — PROGRESS NOTES
[facility]  Nephrology Progress Note    Kip Abo Attending:  Yariel Knight*       Assessment and Plan:    1) TANIA- due to volume depletion with modest PO intake + significant ostomy losses- no obstruction by US- improving rapidly- beth       Labs:   Lab Results   Component Value Date    WBC 8.2 08/30/2019    HGB 7.2 08/30/2019    HCT 21.8 08/30/2019    .0 08/30/2019    CREATSERUM 2.69 08/30/2019    BUN 58 08/30/2019     08/30/2019    K 3.9 08/30/2019     08/30/2 were discussed with patient and/or family by bedside.           Amari Corey  8/30/2019  8:32 AM

## 2019-08-30 NOTE — PHYSICAL THERAPY NOTE
PHYSICAL THERAPY EVALUATION - INPATIENT     Room Number: 9012/0567-Z  Evaluation Date: 8/30/2019  Type of Evaluation: Initial  Physician Order: PT Eval and Treat    Presenting Problem: weakness and fatigue  Reason for Therapy: Mobility Dysfunction an ([facility]    • Sjogren's syndrome ([facility]    • Stroke ([facility]     tia   • Thalassemia minor    • THALLASEMIA    • Unspecified essential hypertension    • Unspecified sleep apnea psg 8/5/13 TX 8-27-13    AHI 52/ supine 101/ sao2 88%  CPAP 11 [facility]   • Visual impa know what I'm going to do. \"    Patient self-stated goal is to regain independence.      OBJECTIVE  Precautions: Cardiac;Bed/chair alarm;Colostomy;Drain(s)(left buttock)  Fall Risk: High fall risk    WEIGHT BEARING RESTRICTION  Weight Bearing Restriction: N walker  Pattern: Shuffle  Stoop/Curb Assistance: Not tested  Comment : Score based on FIM definitions per department protocol. Skilled Therapy Provided:   Session approved by RN. Pt received side-lying in bed, agreeable to therapy.       Supine to Sit: C decreased functional strength, decreased balance, impaired safety awareness with rolling walker. Functional outcome measures completed include AM-PAC.   Based on this evaluation, patient's clinical presentation is evolving and overall the evaluation comple

## 2019-08-30 NOTE — CONSULTS
[facility] Surgical Oncology    Patient Name:  Dane Ramos   YOB: 1934   Gender:  Female   Appt Date:  8/30/2019   Provider:  Marquez Barragan MD   Insurance:  MEDICARE PART A&B     PATIENT PROVIDERS  Primary Care Halie Overton superimposed on chronic kidney disease, unspecified CKD stage, unspecified acute renal failure type ([facility]       History of Present Illness:  Patient is an 80year-old woman with a history of rectal carcinoma    She initially met with our office in 2017 for BMI 14.75 kg/m²      Medications Reviewed:        Current Facility-Administered Medications on File Prior to Encounter:  Darbepoetin [place] (ARANESP) 300 MCG/0.6ML injection 300 mcg 300 mcg Subcutaneous Q30 Days Sherra Spurling,  mcg at 10/11/16 1033 Reviewed:    Ativan [Lorazepam]      CONFUSION    Comment:Increased anxiety and confusion  Aspirin Cr [Aspirin]    BLEEDING  Ciprofloxacin               Comment:Itching  Flagyl [Metronidazo*        Comment:Itching  Msg [Monosodium Glu*        Comment:heada Ileostomy/jejunostomy,nontube     • Other surgical history      colon resection x 2   • Other surgical history  2003    vulvectomy per Dr. Virl Dubin   • Other surgical history      perirectal abcesses   • Other surgical history  10-07    hip fracture      Rev the vagina. The catheter within the Samy's pouch was left to dependent gravity drain. CT 3/22/19:  CONCLUSION:    1. No evidence of radiopaque foreign body or tampon within the vagina.     2. Stable appearance to decompression of the Samy's robert

## 2019-08-30 NOTE — PLAN OF CARE
Patient is A&Ox4, NSR/ST on tele. Lung sounds are diminished upon auscultation. Patient up with 1x walker, set up & cues needed. Generalized weakness, using bedside commode. Fall risk precautions in place, SCDs on.  Patient drains and completes Ileostomy ca toileting schedule  Outcome: Progressing

## 2019-08-31 NOTE — PLAN OF CARE
Assumed care of patient at [phone]. Patient is A&Ox4, NSR/ST on tele. Patient states having improved pain since drain exchange today. Drain dressing remains c/d/I. To bulb suction. To be flushed q 8 hours. Next flush scheduled for 0500.  Ileostomy drainage yel transportation as appropriate  - Identify discharge learning needs (meds, wound care, etc)  - Arrange for interpreters to assist at discharge as needed  - Consider post-discharge preferences of patient/family/discharge partner  - Complete POLST form as carolann

## 2019-08-31 NOTE — PROGRESS NOTES
[facility]  Nephrology Progress Note    Jordana Nathanael Patient Status:  Inpatient    1934 MRN [id number]   [facility] 8NE-A Attending Tc Childs [facility] Day # 3 PCP KASIA WALLIS       SUBJECTIVE:  Feels some Lab 08/28/19  1847   ALT 12*   AST 6*   ALB 3.1*       Recent Labs   Lab 08/28/19  2158   PGLU 83       Meds:     Current Facility-Administered Medications:  cycloSPORINE (RESTASIS) 0.05 % ophthalmic emulsion 1 drop 1 drop Both Eyes Q12H   furosemide (LA today    #2. Metabolic acidosis- chronic and related to ostomy losses. Cont sodium bicarb tabs    #3. Anemia- chronic component with CKD but acutely worse with IVF. Being transfused today    #4.  Restless legs- will try requip        Questions/concerns w

## 2019-08-31 NOTE — PROGRESS NOTES
[facility]  Progress Note     Yossi Gaxioal Patient Status:  Inpatient    1934 MRN [id number]   [facility] 8NE-A [facility] Sonia Angulo     Chief Complaint: Weakness    S: --    ALT 12*  --   --   --    BILT 0.4  --   --   --    TP 7.7  --   --   --        Estimated Creatinine Clearance: 12.7 mL/min (A) (based on SCr of 2.15 mg/dL (H)).     Recent Labs   Lab 08/30/19  1349   PTP 13.3   INR 0.97       No results for input(s): Need for Inpatient Hospitalization - Initial Certification    Patient will require inpatient services that will reasonably be expected to span two midnight's based on the clinical documentation in H+P.    Based on patients current state of illness, I antici

## 2019-08-31 NOTE — PLAN OF CARE
Assumed care of pt at 1900 she was on the bsc at that time had her ileotomy bag emptied.    2100 pt svetlana drain was flushed with 10cc of ns and then aspirated out with a return of the 10cc's the bulb was emptied it has 15cc and then placed back to suction   Pt

## 2019-08-31 NOTE — PLAN OF CARE
RN SHIFT NOTE    Assumed care of pt at 0700. Pt denies pain. Pt is alert and oriented x 4 w/ forgetfulness. Reminders completed and continuing to monitor. Pt is NSR on tele, S1 and S2 present and pt denies cardiac symptoms.  Lung sounds clear/ diminished bi

## 2019-09-01 NOTE — CM/SW NOTE
MSW alerted [facility] NPV of 3pm dc today. [facility] requested for  at 3pm.  PCS form completed and placed on chart.     [facility] [address] [facility]  [id number]    Desmond Mcneal,

## 2019-09-01 NOTE — PROGRESS NOTES
Name:Daysi Moulton  YTU#:[id number]  :1934      Subjective:  Pain better after 3 way stopcock removed. Bulb exchaged for bag at patient's request also. Objective:  Left transgluteal drain site is clean, dry and intact. Vital Signs:   B

## 2019-09-01 NOTE — PROGRESS NOTES
[facility]  Progress Note     Margarita López Patient Status:  Inpatient    1934 MRN [id number]   [facility] 8NE-A Attending [facility] Day # 4 PCP Cortney Montes     Chief Complaint: Weakness    S: AST 6*  --   --   --    ALT 12*  --   --   --    BILT 0.4  --   --   --    TP 7.7  --   --   --        Estimated Creatinine Clearance: 12.7 mL/min (A) (based on SCr of 2.15 mg/dL (H)).     Recent Labs   Lab 08/30/19  1349   PTP 13.3   INR 0.97       No re - Initial Certification    Patient will require inpatient services that will reasonably be expected to span two midnight's based on the clinical documentation in H+P.    Based on patients current state of illness, I anticipate that, after discharge, patient

## 2019-09-01 NOTE — PROGRESS NOTES
Name:Daysi Mora  VWN#:[id number]  :1934      Subjective:  Feels a little bit better today    Objective:  Trans gluteal drain site is clean, dry and intact    Vital Signs:  Blood pressure 122/54, pulse 66, temperature 98.5 °F (36.9 °C), temp

## 2019-09-01 NOTE — PROGRESS NOTES
[facility]  Nephrology Progress Note    Naeem Velez Patient Status:  Inpatient    1934 MRN [id number]   [facility] 8NE-A Attending Garett RamirezTONEY [facility] Day # 4 PCP Yaz Sy       SUBJECTIVE:  C/o discom --  2.3*  --   --    GLU 92 110* 91 103* 98       Recent Labs   Lab 08/28/19  1847   ALT 12*   AST 6*   ALB 3.1*       Recent Labs   Lab 08/28/19  2158   PGLU 83       Meds:     Current Facility-Administered Medications:  cycloSPORINE (RESTASIS) 0.05 % op sodium bicarb tabs    #3. Anemia- chronic component with CKD but acutely worse with IVF. Stable post-transfusion    #4. Restless legs-  Cont requip        Questions/concerns were discussed with patient and/or family by bedside.       OK for d/c from renal

## 2019-09-01 NOTE — PLAN OF CARE
Assumed care of pt at [address]. Alert and oriented x4, forgetful at times  On RA with SpO2 at 95%, lungs clear/diminished  NSR per tele. Pt denies chest pain, SOB, palpitations, and pain.  VSS  Rectovaginal tube draining tan fluid- flushed and aspirated per o Monitor for opioid side effects  - Notify MD/LIP if interventions unsuccessful or patient reports new pain  - Anticipate increased pain with activity and pre-medicate as appropriate  Outcome: Progressing     Problem: SAFETY ADULT - FALL  Goal: Free from fa for mobility and gait  - Educate and engage patient/family in tolerated activity level and precautions  - Recommend use of  RW for transfers and ambulation   Outcome: Progressing     Problem: Impaired Activities of Daily Living  Goal: Achieve highest/safes

## 2019-09-01 NOTE — PROGRESS NOTES
Patient uncomfortable because the three-way valve sits under her bottom (drain short). Asked nursing to obtain an extension to the drain and hook back up to bile bag which the patient is used to changing. No reservations on DC today from my standpoint.

## 2019-09-01 NOTE — PROGRESS NOTES
Report given to Kanu Hale at extension x [phone] at [facility]. Awaiting patient transport.

## 2019-09-01 NOTE — PLAN OF CARE
RN SHIFT NOTE    Assumed care of pt at 0700. Pt is alert and oriented x 4. Pt is NSR on tele, S1 and S2 present and pt denies cardiac symptoms. Lung sounds clear bilaterally. Abdomen soft and round. Pt has RUQ ileostomy that is C/D/I.  No complications note

## 2019-09-03 NOTE — DISCHARGE SUMMARY
[facility]  DISCHARGE SUMMARY     Rhea Giordano Patient Status:  Inpatient    1934 MRN [id number]   [facility] 8NE-A Attending No att. providers found   Hosp Day # 4 PCP Minesh Hines     Date of Admission: 2019 her electrolytes stable hemodynamically stable and vital stable patient was accepted to [facility] and was cleared for discharge.      Lace+ Score: 79  59-90 High Risk  29-58 Medium Risk  0-28   Low Risk         TCM Follow-Up Recommendation:  LACE > Tabs  Commonly known as:  REMERON      Take 7.5 mg by mouth nightly. Refills:  0     morphINE Sulfate (Concentrate) 20 MG/ML Soln      Take 0.5 mL (10 mg total) by mouth every 4 (four) hours as needed for Pain.    Quantity:  30 mL  Refills:  0     omepraz extremities. Extremities: No edema.   -----------------------------------------------------------------------------------------------  PATIENT DISCHARGE INSTRUCTIONS: See electronic chart    Ria Wesley DO    Time spent:  > 30 minutes

## 2019-09-07 NOTE — ED NOTES
Care assumed. Pt is awaiting ambulance transfer back to her residence. She is ambulatory with minimal assistance.

## 2019-09-07 NOTE — ED PROVIDER NOTES
Patient Seen in: [facility] Emergency Department    History   Patient presents with:  Cath Tube Problem (gastrointestinal, urinary, integumentary)    Stated Complaint: pulled out wound drain    HPI    80year-old patient presents to the emergency de [facility]     as a child   • Osteoarthrosis, unspecified whether generalized or localized, unspecified site    • OSTEOPENIA    • Paget's disease     Vulva   • Peripheral vascular disease ([facility] [address]    • Perirectal abscess    • Pneumonia, organism unspecified(48 drinks      Comment: 1 glass of wine a month    Drug use: No             Review of Systems    Positive for stated complaint: pulled out wound drain  Other systems are as noted in HPI. Constitutional and vital signs reviewed.       All other systems reviewe at this point and therefore decision was made to get a CT of the pelvis to determine if there was a large enough fluid collection to drain anyway.   The radiologist explained that he needs to have a fluid collection in order to see where he is putting the t corresponds with     known Samy pouch. Catheter has been removed since prior examination. Collection measures 3.2 x 3.4 cm.     2. 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   There has been interval developm return to [facility] on Monday morning 6:00 [facility] for the Cath Lab to undergo this procedure she should not eat after midnight on Sunday.   I have discussed this with the patient, her doctor, her oncology coverage, and the physician who will b

## 2019-09-07 NOTE — ED NOTES
THE [facility] ambulance was called for transport and given a three hour ETA. They contacted other companies to find the shortest ETA being given by [facility] of one hour.

## 2019-09-09 NOTE — PROGRESS NOTES
S/p PICC line inserted , right arm, c/d/i. Denies pain, VSS.  Eating and drinking, [facility] transported in stable condition

## 2019-09-09 NOTE — PROGRESS NOTES
[facility]  Progress Note      Vanda Kidd Patient Status:  Outpatient    1934 MRN [id number]   [facility] CT Attending Warner Pope MD   Hosp Day # 0 PCP Shyam Bo spoke with Dr. Milagro Montes De Oca regarding Ms. Burkett

## 2019-09-09 NOTE — PROCEDURES
[facility]  Procedure Note    Mariana Walker Patient Status:  Outpatient    1934 MRN [id number]   [facility] CT Attending Ale De La Garza MD   Hosp Day # 0 AP Pool     Procedure: Picc Placement    Pre-Procedur

## 2019-09-10 NOTE — CDS QUERY
Potential for Impaired Nutritional Status  DOCUMENTATION CLARIFICATION FORM  Dear Doctor Kary Burkett,   Clinical information suggests potential for impaired nutritional status.  For accurate ICD-10-CM code assignment to reflect severity of illness and risk o

## 2019-09-12 NOTE — ED INITIAL ASSESSMENT (HPI)
Patient presents to the ER via EMS from assisted living facility with complaint of abnormal labs, elevated K and low hgb. Patient denies any complaints related to.

## 2019-09-12 NOTE — ED PROVIDER NOTES
Patient Seen in: [facility] Emergency Department    History   Patient presents with:  Abnormal Labs    Stated Complaint: K of 6 .8, hgb 7    HPI    CHIEF COMPLAINT: Hyperkalemia, anemia    HISTORY OF PRESENT ILLNESS: Patient is a pleasant 85-year-ol SpO2 99 %   O2 Device None (Room air)       Current:/55   Pulse 86   Temp 98 °F (36.7 °C)   Resp 18   SpO2 100%         Physical Exam    Nursing notes and vital signs reviewed     General Appearance: alert and oriented x 4, no acute distress  Eyes PLATELET.   Procedure                               Abnormality         Status                     ---------                               -----------         ------                     CBC W/ DIFFERENTIAL[[id number]]          Abnormal            Final resul present. There was no indication for further evaluation, treatment or admission on an emergency basis. Comprehensive verbal and written discharge and follow-up instructions were provided to help prevent relapse or worsening.    I discussed the case with afshan

## 2019-09-13 NOTE — TELEPHONE ENCOUNTER
Pt called. Her d/c instructions said to make an appt for one day. She is still in rehab at [facility]. She did not make an appt b-c she doesn't know when she can get to the office.   Her phone number at [facility] is [address]

## 2019-11-11 NOTE — PROGRESS NOTES
Sw called and spoke with patient. Patient reports that she needs assistance with transportation and care in the home. She reports that family was helping her, but they have returned to their other states (brother in [place] and son in [place]).  Patient requested and

## 2019-11-11 NOTE — TELEPHONE ENCOUNTER
MD Tino Cortez RN; Katie Davis, APRN   Caller: Unspecified (Today, 11:21 AM)             We should get her in this week. [facility] care and labs and CHARLEY or MD in [address] [place] [address],[address] [facility]      Spoke with Gui Bhat RN from

## 2019-11-11 NOTE — TELEPHONE ENCOUNTER
Jen Kim from [facility] called on behalf of Kadie Alba saying she was recently discharged from [address] [place] [address] with a pick line, and she is unsure if that pick line was put in here, or in [facility].  She also wanted to know If she should keep the pick line, or if i

## 2019-11-13 NOTE — PROGRESS NOTES
Sw attempted to reach patient. She did not answer phone and VM box was full. At [address] attempted to reach patient again.  Patient answered and requested a call tomorrow reporting that [employer] was there to assist her in setting up her Internet so she can ord

## 2019-11-15 NOTE — PROGRESS NOTES
Sw called and spoke with patient. Patient reports that she was able to have groceries delivered by [employer] with assistance from her POA.  She also stated that she had a physicians assistant come to the home and assist her with her medications and medication d

## 2019-11-18 NOTE — TELEPHONE ENCOUNTER
Erica Kate from home health called to say that they will be having to discharge the patient from care due to her insurance. They wanted to let the Dr know.  Please advise as necessary

## 2019-11-18 NOTE — PROGRESS NOTES
SW attempted to reach patient on 2 separate occasions today to discuss change of home health care agency. Patient did not answer either call and no voicemail was set up.  Sw did email referral to [facility] and called Juan Francisco Muniz to receive confirm

## 2019-11-18 NOTE — PROGRESS NOTES
CHERRY called and spoke with Washington Neighbors, [facility], to find out why patient was discharged and also who patients visiting home health physician was. Washington Neighbors reported that patient has a physician through [facility] ([phone]).  This information wa

## 2019-11-19 NOTE — TELEPHONE ENCOUNTER
Ramona Muñoz discharged from nursing facility last week and refusing home health/hospice and family help. Ramona Muñoz is going to put a new order in for hospice.  larry

## 2019-11-19 NOTE — PROGRESS NOTES
TOYA received return call from [place],  at [place] [address], and was informed that patient discharged the same day to [facility] [place].  Toya called and left a voice message for Tiff Velázquez at [facility] requesting a return call to discuss n

## 2019-11-19 NOTE — TELEPHONE ENCOUNTER
Spoke with nurse at [facility], Surinder Rojas. She states patient in unable to drive. She was discharged from nursing facility last week and needs a lot of help at home. Patient agrees to hospice, today. Dr Milagro Montes De Oca feels this is appropriate at this time.  Order

## 2019-11-19 NOTE — PROGRESS NOTES
CHERRY called and spoke with Navya Mackey,  for [facility]. Navya Mackey reported that she could not verify this callers identity and would need a call from Adrian Mejia.  Cherry did update her on reason provided by [facility] for discha

## 2019-11-20 NOTE — TELEPHONE ENCOUNTER
[facility] health called to get clarification on a script that they had received for [place]. They were looking to get additional information.  Please advise

## 2019-11-21 NOTE — TELEPHONE ENCOUNTER
[facility] at [phone] is calling to let you kno admission is pending, daughter is still making decision.  Thank you for working on it

## 2019-11-22 NOTE — TELEPHONE ENCOUNTER
Patient is now in hospice. Nurse is asking if patient still needs PICC line. Per Dr Elizabeth Gardner, ok to discontinue PICC line. Nurse is asking where patient gets her ostomy supplies.  Informed nurse, it was ordered through patient's home health, which is [facility]

## 2019-11-22 NOTE — TELEPHONE ENCOUNTER
The call center was recently instructed by Rick Mercado to end our messages with \"please call\" in place of \"please advise. \" I see how that would be unnecessary here, but got in the habit of writing it. Sorry!

## 2019-11-22 NOTE — TELEPHONE ENCOUNTER
Farida from residential hospice called to say Katerina Luna was admitted to routine hospice in her home yesterday, and they thank Maria Esther Villanueva for the privilege of care for her.  She also states that they are award of 's request. Please call

## 2019-11-22 NOTE — TELEPHONE ENCOUNTER
[facility] Maude Clayton at [phone] is calling because she have questions on her PICC lines and to clarify Dr. Jael Curiel orders.

## 2020-01-27 ENCOUNTER — TELEPHONE (OUTPATIENT)
Dept: HEMATOLOGY/ONCOLOGY | Facility: HOSPITAL | Age: 85
End: 2020-01-27

## 2020-01-27 NOTE — TELEPHONE ENCOUNTER
Received after hours message from [place] from [facility] hospice to inform Dr Jodie Arambula of [place] passing on 1/24. No details as of time of death.  Con Randolph

## 2020-01-27 NOTE — TELEPHONE ENCOUNTER
Notified of patient's death last week while in hospice. I called her son and left a message offering our condolences.    Chaz Corona MD

## 2020-01-27 NOTE — TELEPHONE ENCOUNTER
Received fax from doctor service to inform pt  2020. Will send IB to [facility] death notification pool.

## 2020-02-01 RX ORDER — GABAPENTIN 100 MG/1
CAPSULE ORAL
Qty: 60 CAPSULE | Refills: 1 | OUTPATIENT
Start: 2020-02-01

## 2020-06-18 NOTE — PROGRESS NOTES
HPI:    Patient ID: aDne Ramos is a 80year old female. Date:  8/4/2018  Date of Admission:  (Not on file)  [address] [place] [address] Drive date:  7/26/18  Discharge date to [facility]:  7/31/18  ELOS:  7-14 days  Anticipated discharge date:  8/15/18  HPI  Ms. Burkett Rfl: 0   Cholecalciferol (VITAMIN D) 1000 units Oral Tab Take 1,000 Units by mouth daily. Disp: 30 tablet Rfl: 0   sodium bicarbonate 650 MG Oral Tab Take 2 tablets (1,300 mg total) by mouth 2 (two) times daily.  Disp: 120 tablet Rfl: 3   Multiple Vitamins- Sjogren's–stable; speech therapy has been consulted     3. Sacral intercurrent pelvic fractures–stable; PT/OT; continue with Tylenol as needed     4. Anemia and thalassemia–stable seminal continue with current medications and will monitor     5.   Rectal Rendering Text In Billing: The slides were read, and reported in an attached document.

## 2021-06-11 NOTE — PROGRESS NOTES
EMERGENCY DEPARTMENT ENCOUNTER  Patient was placed in face mask in first look and the following protective measures were taken unless additional measures were taken and documented below in the ED course. Patient was wearing facemask when I entered the room and throughout our encounter. I wore full protective equipment throughout this patient encounter including a face mask, and gloves. Hand hygiene was performed before donning protective equipment and after removal when leaving the room.    Room Number:  36/36  Date of encounter:  6/11/2021  PCP: Rachid العلي Jr., MD    HPI:  Context: Fritz Flores is a 87 y.o. male who presents to the ED c/o chief complaint of this morning.  Patient states he was using his walker and lost his balance, hitting the side of his [place] with his left forearm.  He denies hitting his head.  He states he fell several days ago and injured his right forearm and had some minor skin tears.  Patient denies pain.  He states his tetanus is up-to-date.  He denies headache, neck pain or back pain.  He denies chest pain, belly pain, nausea or vomiting.  He states his legs are always swollen and they are not more so.    MEDICAL HISTORY REVIEW  Reviewed in [facility].  Patient was admitted in July 2019 for a close left intertrochanteric hip fracture.    PAST MEDICAL HISTORY  Active Ambulatory Problems     Diagnosis Date Noted   • Closed intertrochanteric fracture of hip, left, initial encounter (CMS/[facility]) 06/30/2019   • Closed comminuted intertrochanteric fracture of proximal end of left femur (CMS/[facility]) 06/30/2019   • Ulcerative colitis (CMS/[facility]) 07/01/2019   • Coronary artery disease 07/01/2019   • Hyponatremia 07/01/2019   • Postoperative anemia due to acute blood loss 07/01/2019     Resolved Ambulatory Problems     Diagnosis Date Noted   • Metabolic encephalopathy 07/01/2019     Past Medical History:   Diagnosis Date   • Colon polyps    • Hyperlipemia    • Myocardial infarction (CMS/[facility]) 1989  Heme/Onc Progress Note    Patient Name: Dane Ramos   YOB: 1934   Medical Record Number: [id number]   CSN: [id number]   Attending Physician: Rikki Weber M.D. Subjective: Unchanged. No rectal leakage. pressure in pelvis.       PAST SURGICAL HISTORY  Past Surgical History:   Procedure Laterality Date   • CARDIAC SURGERY  1989    CABG X 1   • COLONOSCOPY  2014   • COLONOSCOPY N/A 8/23/2016    Procedure: COLONOSCOPY INTO CECUM WITH COLD BIOPSIES;  Surgeon: Aaron Gregg MD;  Location: [facility] ENDOSCOPY;  Service:    • FEMUR IM NAILING/RODDING Left 6/30/2019    Procedure: FEMUR INTRAMEDULLARY NAILING/RODDING;  Surgeon: Jaqueline Wagoner MD;  Location: [facility] MAIN OR;  Service: Orthopedics   • FRACTURE SURGERY      upper leg w/hardware   • HIP SURGERY Left     x 3       FAMILY HISTORY  History reviewed. No pertinent family history.    SOCIAL HISTORY  Social History     Socioeconomic History   • Marital status:      Spouse name: Not on file   • Number of children: Not on file   • Years of education: Not on file   • Highest education level: Not on file   Tobacco Use   • Smoking status: Never Smoker   • Smokeless tobacco: Never Used   Substance and Sexual Activity   • Alcohol use: No   • Drug use: No   • Sexual activity: Defer       ALLERGIES  Penicillins    The patient's allergies have been reviewed    REVIEW OF SYSTEMS  All systems reviewed and negative except for those discussed in HPI.     PHYSICAL EXAM  I have reviewed the triage vital signs and nursing notes.  ED Triage Vitals [06/11/21 0630]   Temp Heart Rate Resp BP SpO2   97.6 °F (36.4 °C) 99 16 126/72 99 %      Temp src Heart Rate Source Patient Position BP Location FiO2 (%)   Tympanic Monitor -- -- --       General: Mild distress.  HENT: NCAT, PERRL, Nares patent.  Eyes: no scleral icterus.  Neck: trachea midline, no ROM limitations.  CV: regular rhythm, regular rate.  Respiratory: normal effort, CTAB.  Abdomen: soft, nondistended, NTTP, no rebound tenderness, no guarding or rigidity  : deferred.  Musculoskeletal: no deformity.  Multiple superficial skin tears are noted on the ulnar aspect of patient's left forearm.  He has full range of motion of his left  •  Darbepoetin [place] (ARANESP) 300 MCG/0.6ML injection 300 mcg, 300 mcg, Subcutaneous, Q30 Days    Physical Examination:  General: Patient is alert and oriented x 3, not in acute distress.   Anxious  Vital Signs: /53 (BP Location: Right arm)   Pulse MCH 23.1 (L) 27.0 - 33.2 pg   MCHC 32.0 31.0 - 37.0 g/dL   RDW 21.4 (H) 11.5 - 16.0 %   RDW-SD 53.9 (H) 35.1 - 46.3 fL   Neutrophil Absolute Prelim 4.54 1.30 - 6.70 x10 (3) uL   Neutrophil Absolute 4.54 1.30 - 6.70 x10(3) uL   Lymphocyte Absolute 0.48 (L shoulder, left elbow and left wrist.  His left forearm is mildly tender to palpation.  Has a 2+ left radial pulse and his  strength is 5 out of 5 bilaterally.  His thoracic and lumbar spine is nontender to palpation he has no abrasions or bruising on his back.  Neuro: alert, moves all extremities, follows commands.  Skin: warm, dry.    LAB RESULTS  No results found for this or any previous visit (from the past 24 hour(s)).    I ordered the above labs and reviewed the results.    RADIOLOGY  XR Forearm 2 View Left    Result Date: 6/11/2021  EMERGENCY 2 VIEWS LEFT FOREARM 06/11/2021  CLINICAL HISTORY: Patient fell and caught his balance on brick wall with his left forearm, has abrasion mid shaft to the left forearm.  FINDINGS: Initially AP and lateral views of left forearm were submitted for interpretation. I requested additional oblique views and 2 additional oblique views were obtained for a total of 4 views. I see no convincing radiographic evidence of acute fracture or malalignment or osseous abnormality in left radius or ulna. There does appear to be a soft tissue laceration in the soft tissues of mid left forearm. Extensive calcified plaques are present in left radial and ulnar arteries in the mid and distal left forearm.  This report was finalized on 6/11/2021 1:02 PM by Dr. Cameron Ford M.D.        I ordered the above noted radiological studies. I reviewed the images and results. I agree with the radiologist interpretation.    PROCEDURES  Procedures    MEDICATIONS GIVEN IN ER  Medications - No data to display    PROGRESS, DATA ANALYSIS, CONSULTS, AND MEDICAL DECISION MAKING  A complete history and physical exam have been performed.  All available laboratory and imaging results have been reviewed by myself prior to disposition.    MDM  After the initial H&P, I discussed pertinent information from history and physical exam with patient/family.  Discussed differential diagnosis.  Discussed plan for ED  evaluation/work-up/treatment.  All questions answered.  Patient/family is agreeable with plan.  ED Course as of Jun 11 1651 Fri Jun 11, 2021   0852 Patient presents with a mechanical fall with a skin tear to his left forearm.  We will clean his skin tear and will x-ray his left forearm.  Patient's tetanus is up-to-date so we do not need to give a tetanus shot today.    [DE]   1038 Patient's left forearm x-ray is negative for fracture.    [DE]      ED Course User Index  [DE] Saji Benavides MD       AS OF 16:51 EDT VITALS:    BP - 155/74  HR - 73  TEMP - 97.6 °F (36.4 °C) (Tympanic)  O2 SATS - 98%    DIAGNOSIS  Final diagnoses:   Skin tear of left forearm without complication, initial encounter   Contusion of left forearm, initial encounter   Fall, initial encounter         DISPOSITION    DISCHARGE    Patient discharged in stable condition.    Reviewed implications of results, diagnosis, meds, responsibility to follow up, warning signs and symptoms of possible worsening, potential complications and reasons to return to ER.    Patient/Family voiced understanding of above instructions.    Discussed plan for discharge, as there is no emergent indication for admission. Patient referred to primary care provider for BP management due to today's BP. Pt/family is agreeable and understands need for follow up and repeat testing.  Pt is aware that discharge does not mean that nothing is wrong but it indicates no emergency is present that requires admission and they must continue care with follow-up as given below or physician of their choice.     FOLLOW-UP  [facility]  [address] [place] [address] [phone]  Schedule an appointment as soon as possible for a visit       Rachid العلي Jr., MD  [address] [address]  [phone]      As needed         Medication List      No changes were made to your prescriptions during this visit.          Makayla  the past. [employer] stores intact a few weeks ago.  ?when last dose, suspect was given in AI.  No evidence of active blood loss.  CBC being followed. Will give epo here.     # Pelvic fractures: Due to insufficiency.  Seen by orthopedics.  Medical management re Saji GARCIA MD  06/11/21 2663

## 2021-12-28 NOTE — PLAN OF CARE
Care Management Follow Up    Length of Stay (days): 7    Expected Discharge Date: 12/30/2021     Concerns to be Addressed: discharge planning       Patient plan of care discussed at interdisciplinary rounds: Yes    Anticipated Discharge Disposition: Group Home       Anticipated Discharge Services: None    Anticipated Discharge DME: None    Patient/family educated on Medicare website which has current facility and service quality ratings: other (see comments) (N/A)    Education Provided on the Discharge Plan:  Yes    Patient/Family in Agreement with the Plan: yes    Referrals Placed by CM/SW:  (N/A)    Private pay costs discussed: N/A    Additional Information: SONAM informed by pt's nurse Lulu that pt not discharging today.  Pt to have surgery tomorrow (debridement) and will be NPO as of midnight.  SONAM called and spoke with neville Goetz  at [facility].  SONAM updated her regarding pt discharge delayed due to surgery tomorrow.  She shared that pt has been depressed as of late, apparently related to missing her son and grandchildren.  SONAM called and spoke with Alexa,  at [facility], regarding pt discharge delayed due to surgery tomorrow.  She had a lot of questions.  SONAM spoke with pt's nurse, and she had nothing to add as far as medical updates.  SONAM let Alexa know she would request that Dr Akins call her to answer her questions regarding pt's care plan.  SONAM spoke with Dr Akins and provided him with Alexa's name, role, and phone number so that he could contact Alexa regarding pt.  Group home staff versus medical ride at discharge.      SHANTI Yo, ANGELA 12/27/21 7:02 PM               Problem: Impaired Activities of Daily Living  Goal: Achieve highest/safest level of independence in self care  Interventions:  - Assess ability and encourage patient to participate in ADLs to maximize function  - Promote sitting position while performing A

## 2022-11-28 NOTE — PROGRESS NOTES
From: Fina Barry  To: Aggie Burns  Sent: 11/25/2022 5:27 PM CST  Subject: Rx    Hi there,  I've been cutting in half my Quetiapine 50mg for over a month now. It's a struggle as they are so small and often split extremely uneven, maybe partially because there's no score arsen. With all my back and forth fatigue, I go extended lengths of time where 25 mg is sufficient. My sleep patterns are very unpredictable but I was prescribed 25 mg tablets not far in the past. If I can get an Rx for that strength, it would be helpful. If I need 50 mg, it's easier to take two 25 mg pills so I'll leave the quantity up to Dr. Burns if she approves this. I told her most of this at my last appointment but didn't ask for a lower dose because I had so many of the others that I had forever. I have a few doses left. Please send to my pharmacy on file at [facility] if approved. ~Fina   [facility]    PATIENT'S NAME: Milan Bright [place] A   ATTENDING PHYSICIAN: Delmis Nuñez M.D.    PATIENT ACCOUNT #: [de-identified] LOCATION: [address] [place] [address] RECORD #: [id number] YOB: 1934   DATE OF SERVICE: 04/04/2019       CANCER creatinines running in the 3's. She also has anemia that is a combination of chronic inflammatory anemia and anemia related to chronic kidney disease.   She has had iron deficiency in the past, and we have given her iron infusions, the most recent of which 18.1, platelets 808. Her BUN is 57 and creatinine 3.32, which are approximately stable. Her liver function tests are normal.  Her potassium is borderline at 5.0. IMPRESSION:  Rectal cancer. There is no plan to treat her with systemic therapy.   Her t

## 2022-12-13 NOTE — ED NOTES
Awaiting sodium bicarb from pharm
[facility] updated eta @ 60min
Joseph Chang update@  On scene
MD at bedside.
Patient ambulate to bathroom via RN assistance. Tolerated.
Patient is resting comfortably.
Patient updated with plan of care.
Patient updated with plan of care.
Patient updated with plan of care. Aware we are waiting for medicar to take her back to nursing facility.
Yes

## 2023-03-29 NOTE — PROGRESS NOTES
[facility]    PATIENT'S NAME: Shefali Perez [place] A   ATTENDING PHYSICIAN: Nataliia Gonzalez M.D.    PATIENT ACCOUNT #: [de-identified] LOCATION: [address] [place] [address] RECORD #: [id number] YOB: 1934   DATE OF SERVICE: 08/03/2017       CANCER living together. Her current medications include Lomotil p.r.n., fluticasone propionate nasal spray, loperamide p.r.n., sodium bicarbonate tablets, triamcinolone, and periodic darbepoetin.     PHYSICAL EXAMINATION:    GENERAL:  She is a well-developed, wel [address] [place] [address] Drive [id number]  /    cc: HERMINIA Toussaint M.D. Milo Savin, M.D. [Change in Activity] : no change in activity [Fever Above 102] : no fever [Malaise] : no malaise [FreeTextEntry4] : port wine stain LLE

## 2024-01-11 NOTE — PLAN OF CARE
Problem: Impaired Activities of Daily Living  Goal: Achieve highest/safest level of independence in self care  Description  Interventions:  - Assess ability and encourage patient to participate in ADLs to maximize function  - Promote sitting position i Yes

## 2024-03-24 NOTE — MR AVS SNAPSHOT
After Visit Summary   6/2/2017    Ciera Barnes    MRN: [id number]           Diagnoses this Visit     Dehydration    -  Primary       Allergies     Aspirin Cr [Aspirin] Bleeding    Ciprofloxacin     Itching      Flagyl [Metronidazole]     Itch Patient extubated this AM. Currently on O2-4L NC, tolerating well. No distress noted. Lungs diminished, CDB encouraged. BP stable- off cleviprex. Afib on monitor, started on amio protocol for afib. Patient A&Ox4, calm and cooperative. Weaned off precedex. Ice chips given 6 hrs post extubation- tolerating well. Full swallow assessment deferred due to vagal reaction with sips of water. CT x2 remain in place to suction- see I&O. Remains on insulin gtt- therapeutic range maintained. Family at bedside and participating in care. See assessments. Safety maintained. Will monitor.     Problem: Patient Centered Care  Goal: Patient preferences are identified and integrated in the patient's plan of care  Description: Interventions:  - What would you like us to know as we care for you? From home with spouse Lucie, generally healthy overall  - Provide timely, complete, and accurate information to patient/family  - Incorporate patient and family knowledge, values, beliefs, and cultural backgrounds into the planning and delivery of care  - Encourage patient/family to participate in care and decision-making at the level they choose  - Honor patient and family perspectives and choices  3/24/2024 1735 by Lucie Friedman, RN  Outcome: Progressing    Problem: Patient/Family Goals  Goal: Patient/Family Long Term Goal  Description: Patient's Long Term Goal: fully recover and return home with wife and family    Interventions:  - discharge planning when appropriate  - safe management during post-op period  - See additional Care Plan goals for specific interventions  3/24/2024 1735 by Lucie Friedman, RN  Outcome: Progressing    Goal: Patient/Family Short Term Goal  Description: Patient's Short Term Goal: recover in immediate post-op period, safe BP and hemodynamic stability - goals discussed w/ wife at bedside while patient intubated/sedated    Interventions:   - maintain intubation/sedation overnight for POD#0  - continue  Monday June 19, 2017 1:45 PM     Appointment with Ana Mauricio; RN - DR. WILCOX at [facility] in [place] ([phone])   [address] can access your MyChar monitoring via swan/aimee throughout POD#0-1  - See additional Care Plan goals for specific interventions  3/24/2024 1735 by Lucie Friedman, RN  Outcome: Progressing    Problem: CARDIOVASCULAR - ADULT  Goal: Maintains optimal cardiac output and hemodynamic stability  Description: INTERVENTIONS:  - Monitor vital signs, rhythm, and trends  - Monitor for bleeding, hypotension and signs of decreased cardiac output  - Evaluate effectiveness of vasoactive medications to optimize hemodynamic stability  - Monitor arterial and/or venous puncture sites for bleeding and/or hematoma  - Assess quality of pulses, skin color and temperature  - Assess for signs of decreased coronary artery perfusion - ex. Angina  - Evaluate fluid balance, assess for edema, trend weights  3/24/2024 1735 by Lucie Friedman, RN  Outcome: Progressing    Goal: Absence of cardiac arrhythmias or at baseline  Description: INTERVENTIONS:  - Continuous cardiac monitoring, monitor vital signs, obtain 12 lead EKG if indicated  - Evaluate effectiveness of antiarrhythmic and heart rate control medications as ordered  - Initiate emergency measures for life threatening arrhythmias  - Monitor electrolytes and administer replacement therapy as ordered  3/24/2024 1735 by Lucie Friedman, RN  Outcome: Progressing       Problem: RESPIRATORY - ADULT  Goal: Achieves optimal ventilation and oxygenation  Description: INTERVENTIONS:  - Assess for changes in respiratory status  - Assess for changes in mentation and behavior  - Position to facilitate oxygenation and minimize respiratory effort  - Oxygen supplementation based on oxygen saturation or ABGs  - Provide Smoking Cessation handout, if applicable  - Encourage broncho-pulmonary hygiene including cough, deep breathe, Incentive Spirometry  - Assess the need for suctioning and perform as needed  - Assess and instruct to report SOB or any respiratory difficulty  - Respiratory Therapy support as indicated  -  Manage/alleviate anxiety  - Monitor for signs/symptoms of CO2 retention  3/24/2024 1735 by Lucie Friedman RN  Outcome: Progressing       Problem: GASTROINTESTINAL - ADULT  Goal: Minimal or absence of nausea and vomiting  Description: INTERVENTIONS:  - Maintain adequate hydration with IV or PO as ordered and tolerated  - Nasogastric tube to low intermittent suction as ordered  - Evaluate effectiveness of ordered antiemetic medications  - Provide nonpharmacologic comfort measures as appropriate  - Advance diet as tolerated, if ordered  - Obtain nutritional consult as needed  - Evaluate fluid balance  3/24/2024 1735 by Lucie Friedman, RN  Outcome: Progressing    Goal: Maintains or returns to baseline bowel function  Description: INTERVENTIONS:  - Assess bowel function  - Maintain adequate hydration with IV or PO as ordered and tolerated  - Evaluate effectiveness of GI medications  - Encourage mobilization and activity  - Obtain nutritional consult as needed  - Establish a toileting routine/schedule  - Consider collaborating with pharmacy to review patient's medication profile  3/24/2024 1735 by Lucie Friedman RN  Outcome: Progressing    Problem: METABOLIC/FLUID AND ELECTROLYTES - ADULT  Goal: Hemodynamic stability and optimal renal function maintained  Description: INTERVENTIONS:  - Monitor labs and assess for signs and symptoms of volume excess or deficit  - Monitor intake, output and patient weight  - Monitor urine specific gravity, serum osmolarity and serum sodium as indicated or ordered  - Monitor response to interventions for patient's volume status, including labs, urine output, blood pressure (other measures as available)  - Encourage oral intake as appropriate  - Instruct patient on fluid and nutrition restrictions as appropriate  3/24/2024 1735 by Lucie Friedman, RN  Outcome: Progressing    Goal: Glucose maintained within prescribed range  Description: INTERVENTIONS:  - Monitor Blood Glucose as  ordered  - Assess for signs and symptoms of hyperglycemia and hypoglycemia  - Administer ordered medications to maintain glucose within target range  - Assess barriers to adequate nutritional intake and initiate nutrition consult as needed  - Instruct patient on self management of diabetes  Outcome: Progressing  Goal: Electrolytes maintained within normal limits  Description: INTERVENTIONS:  - Monitor labs and rhythm and assess patient for signs and symptoms of electrolyte imbalances  - Administer electrolyte replacement as ordered  - Monitor response to electrolyte replacements, including rhythm and repeat lab results as appropriate  - Fluid restriction as ordered  - Instruct patient on fluid and nutrition restrictions as appropriate  Outcome: Progressing     Problem: HEMATOLOGIC - ADULT  Goal: Maintains hematologic stability  Description: INTERVENTIONS  - Assess for signs and symptoms of bleeding or hemorrhage  - Monitor labs and vital signs for trends  - Administer supportive blood products/factors, fluids and medications as ordered and appropriate  - Administer supportive blood products/factors as ordered and appropriate  3/24/2024 1735 by Lucie Friedman, RN  Outcome: Progressing       Problem: SAFETY ADULT - FALL  Goal: Free from fall injury  Description: INTERVENTIONS:  - Assess pt frequently for physical needs  - Identify cognitive and physical deficits and behaviors that affect risk of falls.  - Lorenzo fall precautions as indicated by assessment.  - Educate pt/family on patient safety including physical limitations  - Instruct pt to call for assistance with activity based on assessment  - Modify environment to reduce risk of injury  - Provide assistive devices as appropriate  - Consider OT/PT consult to assist with strengthening/mobility  - Encourage toileting schedule  3/24/2024 1735 by Lucie Friedman, RN  Outcome: Progressing    Problem: DISCHARGE PLANNING  Goal: Discharge to home or other  facility with appropriate resources  Description: INTERVENTIONS:  - Identify barriers to discharge w/pt and caregiver  - Include patient/family/discharge partner in discharge planning  - Arrange for needed discharge resources and transportation as appropriate  - Identify discharge learning needs (meds, wound care, etc)  - Arrange for interpreters to assist at discharge as needed  - Consider post-discharge preferences of patient/family/discharge partner  - Complete POLST form as appropriate  - Assess patient's ability to be responsible for managing their own health  - Refer to Case Management Department for coordinating discharge planning if the patient needs post-hospital services based on physician/LIP order or complex needs related to functional status, cognitive ability or social support system  3/24/2024 1735 by Lucie Friedman, RN  Outcome: Progressing       Problem: SKIN/TISSUE INTEGRITY - ADULT  Goal: Incision(s), wounds(s) or drain site(s) healing without S/S of infection  Description: INTERVENTIONS:  - Assess and document risk factors for pressure ulcer development  - Assess and document skin integrity  - Assess and document dressing/incision, wound bed, drain sites and surrounding tissue  - Implement wound care per orders  - Initiate isolation precautions as appropriate  - Initiate Pressure Ulcer prevention bundle as indicated  Outcome: Progressing     Problem: Impaired Swallowing  Goal: Minimize aspiration risk  Description: Interventions:  - Patient should be alert and upright for all feedings (90 degrees preferred)  - Offer food and liquids at a slow rate  - No straws  - Encourage small bites of food and small sips of liquid  - Offer pills one at a time, crush or deliver with applesauce as needed  - Discontinue feeding and notify MD (or speech pathologist) if coughing or persistent throat clearing or wet/gurgly vocal quality is noted  Outcome: Progressing

## 2025-07-08 NOTE — PROGRESS NOTES
[facility] Radiation Treatment Management Note 1-5    Patient:  Yossi Gaxiola  Age:  80year old  Visit Diagnosis:    1.  Adenocarcinoma of anal canal ([address]      Primary Rad/Onc:  Dr. Chapincito Rainey    Site Delivered Dose (Gy) Pre Low grade fever at home 99.5--100.2F (in afternoon or evening)  Multiple rashes of different stages of healing which are erythematous on arms, legs, lower back, upper right shoulder and posterior neck (see media)   -recent cutaneous MRSA infection  -completed clindamycin regimen  -no signs of worsening of the lesions or rash  -CBC  -blood cultures   Follow up in 1 week

## (undated) DEVICE — ENDOSCOPY PACK UPPER: Brand: MEDLINE INDUSTRIES, INC.

## (undated) DEVICE — MEDI-VAC SUCTION HANDLE REGULAR CAPACITY: Brand: CARDINAL HEALTH

## (undated) DEVICE — 1200CC GUARDIAN II: Brand: GUARDIAN

## (undated) DEVICE — Device: Brand: DEFENDO AIR/WATER/SUCTION AND BIOPSY VALVE

## (undated) DEVICE — ENDOSCOPY PACK - LOWER: Brand: MEDLINE INDUSTRIES, INC.

## (undated) DEVICE — MEDI-VAC NON-CONDUCTIVE SUCTION TUBING: Brand: CARDINAL HEALTH

## (undated) DEVICE — 3M™ RED DOT™ MONITORING ELECTRODE WITH FOAM TAPE AND STICKY GEL, 50/BAG, 20/CASE, 72/PLT 2570: Brand: RED DOT™

## (undated) DEVICE — FILTERLINE NASAL ADULT O2/CO2

## (undated) DEVICE — OMNIPAQUE 300 POLYB 50ML

## (undated) NOTE — MR AVS SNAPSHOT
After Visit Summary   2/16/2017    Ayla Bal    MRN: [id number]           Allergies     Aspirin Cr [Aspirin] Bleeding    Ciprofloxacin     Itching      Flagyl [Metronidazole]     Itching     Hydrocodone-Acetaminophen [Hydroxyzine] Dizzines Summaries. If you've been to the Emergency Department or your doctor's office, you can view your past visit information in [facility] by going to Visits < Visit Summaries. [facility] questions? Call [phone] for help.   [facility] is NOT to be used for urge

## (undated) NOTE — IP AVS SNAPSHOT
[facility] [place] [address] [place], [address] ~ [phone]                Discharge Summary   5/11/2017    Gonzalez Rhoades           Admission Information        Department    5/11/2017  8ne-A         Thank you for choosing [facility] Tabs   Next dose due:  5/13/17        Take by mouth. Loperamide HCl 2 MG Caps   Commonly known as:  IMODIUM        Take 2 mg by mouth as needed for Diarrhea.                             LUTEIN OR CHRISTO OUTPT NUTRITION, PF HEM/ONC D. [facility] in [place] [facility])    [address] [facility] [address] [place] [address]   [phone]            May 22, 2017  2:00 PM   Radiation Oncology Jeffery Gauthier (05/04/17)  7.63 (05/04/17)  4.30 (05/04/17)  9.2 (L) (05/04/17)  27.2 (L) (05/04/17)  63.3 (L)    (05/04/17)  195     (04/24/17)  7.5 (04/24/17)  4.34 (04/24/17)  9.1 (L) (04/24/17)  29.2 (L) (04/24/17)  67.3 (L)    (04/24/17)  211.0       Recent Hematol - If you have concerns related to behavioral health issues or thoughts of harming yourself, contact [address] [place] [address] at [phone].     - If you don’t have insurance, [facility] has partnered with [employer] [facility] [place] Most common side effects: Pain, fever, rash, fatigue, joint pain, blood clots, high blood pressure   What to report to your healthcare team: Pain, swelling, rash, fever           GI Medications     diphenoxylate-atropine 2.5-0.025 MG Oral Tab    Loperamide

## (undated) NOTE — MR AVS SNAPSHOT
After Visit Summary   5/2/2017    Shani Daniels    MRN: [id number]           Diagnoses this Visit     Rectal cancer [facility])           Allergies     Aspirin Cr [Aspirin] Bleeding    Ciprofloxacin     Itching      Flagyl [Metronidazole]     [place] Appointment with Marek Rosario; RN - DR. WILCOX at [facility] in [place] ([phone])   [address] [facility] [address]       Tuesday May 09, 2017 1:45 PM     Appointment with Mick Mathis; LINDA HEM/ONC CUBA Bravo; Call [phone] for help. [employer] is NOT to be used for urgent needs. For medical emergencies, dial 911.

## (undated) NOTE — MR AVS SNAPSHOT
After Visit Summary   4/26/2017    Margarita López    MRN: [id number]           Diagnoses this Visit     Encounter for education    -  Primary       Allergies     Aspirin Cr [Aspirin] Bleeding    Ciprofloxacin     Itching      Flagyl Caffie Goody [address] [address]       Tuesday May 02, 2017 12:45 PM     Appointment with Ale De La Garza at [facility] in [place] ([phone])   [address] [place] [address] Dr. Wilson [address] [address]       Monday May 08, 2017 1:30 PM     Appointment

## (undated) NOTE — ED AVS SNAPSHOT
Rik Yaz   MRN: [id number]    Department:  [facility] Emergency Department in [place]   Date of Visit:  11/11/2018           Disclosure     Insurance plans vary and the physician(s) referred by the ER may not be covered by your plan.  Please c tell this physician (or your personal doctor if your instructions are to return to your personal doctor) about any new or lasting problems. The primary care or specialist physician will see patients referred from the [facility] Emergency Department.  Manny Sánchez

## (undated) NOTE — IP AVS SNAPSHOT
Patient Demographics     Address  [address] [address] Phone  [phone] [facility]) *Preferred*  [phone] [facility]) E-mail Address  [email or web address]      Emergency Contact(s)     Name Relation Home Work [address]   630 Commonly known as:  PROZAC  Next dose due: Take tomorrow      Take 10 mg by mouth daily. folic acid 1 MG Tabs  Commonly known as:  Ira Dolphin  Next dose due: Take tomorrow      Take 1 tablet (1 mg total) by mouth daily.    Jhon Lowe, Please  your prescriptions at the location directed by your doctor or nurse    Bring a paper prescription for each of these medications  ALPRAZolam 0.25 MG Tabs  morphINE Sulfate (Concentrate) 20 MG/ML Soln           [id number]-G - MAR ACTION REPORT BASIC METABOLIC PANEL (8) [[id number]] (Abnormal)  Resulted: 09/01/19 0717, Result status: Final result   Ordering provider:  Mary Ellen eMjia MD  08/31/19 5571 Resulting lab:  [facility]    Specimen Information    Type Source Collected On   Blood — 09/01/ Procedure Component Value Units Date/Time    Anaerobic Culture [[id number]] Collected:  08/30/19 1530    Order Status:  Completed Lab Status:  Preliminary result Updated:  09/01/19 0949    Specimen:  Tissue from Pelvis      Anaerobic Culture Pending    Aer Ada.   She presented today with generalized weakness fatigue and she otherwise denies chest pain shortness of breath abdominal pain lower extremities edema fever chills[OO.2]    Past Medical History:  Past Medical History:   Diagnosis Date   • ALLERGIC R • FLEXIBLE SIGMOIDOSCOPY N/A 8/21/2018    Performed by Daisy Snellen, MD at [address] 5/25/2017    Performed by Chris Colindres MD at [facility] ENDOSCOPY   • ILEOSTOMY/JEJUNOSTOMY,NONTUBE     • OTHER SURGICAL HISTORY 12 (twelve) hours. Disp:  Rfl:    ferrous sulfate 325 (65 FE) MG Oral Tab EC Take 325 mg by mouth daily with breakfast. Disp:  Rfl:    FLUoxetine HCl 10 MG Oral Cap Take 10 mg by mouth daily.  Disp:  Rfl:    ipratropium-albuterol 0.5-2.5 (3) MG/3ML Inhalati A comprehensive 14 point review of systems was completed. Pertinent positives and negatives noted in the HPI.     Physical Exam:    /67   Pulse 113   Temp 98.1 °F (36.7 °C) (Temporal)   Resp 22   Ht 5' 5\" (1.651 m)   Wt 95 lb (43.1 kg)   SpO2 100% -Continue IV fluids with sodium bicarb and 125 cc/h  -Patient received regular insulin in the emergency room and calcium gluconate as well as dextrose  -Recheck potassium  2. Metabolic acidosis with prerenal azotemia  3.   History of chronic kidney disease palliative care at this point per Dr. Zainab Ordaz       Reason for Consultation:  TANIA / CKD 4 / metabolic acidosis    History of Present Illness:  Rik Mukherjee is a a(n) 80year old female.   Very pleasant 80-year-old female well-known to me over the last • APPENDECTOMY     • COLECTOMY     • COLONOSCOPY      6/09 no dysplasia.   Repeat 2014   • COLONOSCOPY N/A 5/13/2014    Performed by Dre Rico MD at [facility] ENDOSCOPY   • D & C     • EXPLORATORY LAPAROTOMY N/A 9/26/2014    Performed by Hali Gonzalez MD at [facility] Current Facility-Administered Medications:   •  melatonin cap/tab 5 mg, 5 mg, Oral, Nightly  •  0.9% NaCl infusion, , Intravenous, Continuous  •  acetaminophen (TYLENOL EXTRA STRENGTH) tab 500 mg, 500 mg, Oral, Q4H PRN  •  ALPRAZolam (XANAX) tab 0.25 mg, 0 Last data filed at 8/29/2019 0848  Gross per 24 hour   Intake 1220 ml   Output —   Net 1220 ml     Wt Readings from Last 3 Encounters:  08/28/19 : 88 lb 10 oz  07/25/19 : 99 lb 11.2 oz  07/09/19 : 103 lb    General: awake alert  HEENT: No scleral icterus, :  Alina Pratt PT (Physical Therapist)             PHYSICAL THERAPY EVALUATION - INPATIENT     Room Number: 7227/5760-I  Evaluation Date: 8/30/2019  Type of Evaluation: Initial  Physician Order: PT Eval and Treat    Presenting Problem: weaknes • S/P LASIK (laser assisted in situ keratomileusis) 7/2/2014   • [facility]    • Sjogren's syndrome ([facility]    • Stroke ([facility]     tia   • Thalassemia minor    • THALLASEMIA    • Unspecified essential hypertension    • Unspecified sleep apnea psg 8/5/13 [place] \"I'm planning to move into assisted living, but that's by my son out of state and I don't think I can travel. I don't know what I'm going to do. \"    Patient self-stated goal is to regain independence.      OBJECTIVE  Precautions: Cardiac;Bed/chair alarm;C Gait Assessment   Gait Assistance: Dependent assistance(actual: min asst)  Distance (ft): 1  Assistive Device: Rolling walker  Pattern: Shuffle  Stoop/Curb Assistance: Not tested  Comment : Score based on FIM definitions per department protocol.     Skilled fracture (2007), CKD. In this PT evaluation, the patient presents with the following impairments decreased activity tolerance, decreased functional strength, decreased balance, impaired safety awareness with rolling walker.   Functional outcome measures co through 9/1/2019  2:15 PM)      Occupational Therapy Note signed by Jenn Reyes OT at 8/30/2019  3:29 PM  Version 1 of 1    Author:  Jenn Reyes OT Service:  Armand Morlilo Author Type:  Occupational Therapist    Filed:  8/30/2019  3:29 PM Date of Ollie Robledo • Peripheral vascular disease (HCC)    • Perirectal abscess    • Pneumonia, organism unspecified(486)    • Psychiatric disorder    • [facility]al [facility] ([facility] 1/17    mucinous adenocarcinoma   • Renal disorder     ckd   • S/P LASIK (laser assisted in situ kerato \"They kind of gave up on me. \" Referring to [place] [address] rehab.        OBJECTIVE  Precautions: Cardiac;Bed/chair alarm;Colostomy;Drain(s)(left buttock)  Fall Risk: High fall risk    WEIGHT BEARING RESTRICTION  Weight Bearing Restriction: None                PAIN AS Supine to Sit : Minimum assistance  Sit to Stand: Minimum assistance    Skilled Therapy Provided: Supine to sit CGA. Pt with drain on buttock area with leg strap for security. . Min A to stand and to take steps to the commode.  HR up to 134 when pt w Clinical Decision Making LOW - Analysis of occupational profile, problem-focused assessments, limited treatment options    Overall Complexity LOW     OT Discharge Recommendations: Sub-acute rehabilitation(12 to 14 days)       PLAN  OT Treatment Plan: Francesca Hines Lucentis Per 0.1 Mg Injection 08/19/14     Lucentis Per 0.1 Mg Injection 07/15/14     Pneumococcal (Prevnar 13) 09/15/16     Pneumovax 23 01/26/15     Pneumovax 23 01/25/14     Prolia Im Inj, Up To 60 Mg 04/06/18     Regadenoson . 1mg per unit IV 02/06/14 Vitamin B-12 Up To 1000mcg, IM/SC Inj 10/15/14     Vitamin B-12 Up To 1000mcg, IM/SC Inj 06/24/14     Vitamin B-12 Up To 1000mcg, IM/SC Inj 02/10/14     Vitamin B-12 Up To 1000mcg, IM/SC Inj 12/19/13     Vitamin B-12 Up To 1000mcg, IM/SC Inj 11/04/13 Vitamin B-12 Up To 1000mcg, IM/SC Inj 11/04/09     Vitamin B-12 Up To 1000mcg, IM/SC Inj 05/01/09     Vitamin B-12 Up To 1000mcg, IM/SC Inj 03/16/09     Zoster Vaccine Live (Zostavax) 02/10/14

## (undated) NOTE — IP AVS SNAPSHOT
Patient Demographics     Address  [address] [facility] [address] Phone  [phone] [facility]) *Preferred*  [phone] [facility]) E-mail Address  [email or web address]      Emergency Contact(s)     Name Relation Home Work [employer] Spouse 8 TAKE 1 TABLET BY MOUTH FOUR TIMES DAILY AS NEEDED FOR DIARRHEA   Zayra Dorsey MD         folic acid 1 MG Tabs  Commonly known as:  FOLVITE      Take 1 mg by mouth daily.           HYDROcodone-acetaminophen  MG Tabs  Commonly known as:  Adelaide Gregory [id number] cholecalciferol (VITAMIN D3) cap/tab 1,000 Units 07/31/18 0829 Given      [id number] folic acid (FOLVITE) tab 1 mg 07/31/18 0829 Given      [id number] multivitamin (ADULT) tab 1 tablet 07/31/18 6586 Given                    Recent Vital Signs    F Component Value Reference Range Flag Lab   Magnesium 1.5 1.8 - 2.5 mg/dL [facility]            PHOSPHORUS [[id number]] (Abnormal)  Resulted: 07/31/18 5645, Result status: Final result   Ordering provider:  Roger Graf MD  07/30/18 2350 Resulting lab: and pelvic fractures and TANIA with chronic metabolic acidosis is now admitted per Dr. Charisma Riley since her outpt labs were worsening. She was not taking her PO bicarb. She states that she has been feeling nauseated, increasingly so recently.   She has been tryin Past Surgical History:  No date: APPENDECTOMY  No date: APPENDECTOMY  No date: COLECTOMY  No date: COLONOSCOPY      Comment: 6/09 no dysplasia.   Repeat 2014 5/13/2014: COLONOSCOPY      Comment: Procedure: COLONOSCOPY;  Surgeon: Heather Charles Types: Cigarettes    Quit date: 1/19/1975    Smokeless tobacco: Never Used    Alcohol use Yes  0.0 oz/week     Comment: 1 glass of wine a month        Fam Hx  Family History   Problem Relation Age of Onset   • Cancer Sister      breast cancer   • Stroke S 0555   WBC  6.9  4.3   HGB  8.5*  6.7*   MCV  65.3*  65.2*   PLT  212.0  141.0*       Recent Labs   Lab  07/26/18   1105  07/26/18   2157  07/27/18   0555   NA  136  137  138   K  5.3*  5.2*  3.7   CL  114*  115*  109   CO2  7.0*  9.0*  16.0*   BUN  72* previously described as well. There is mild thickening of the anal rectal wall without evidence of acute inflammatory change. A discrete mass is not otherwise identified involving the anal rectal wall. The bladder appears unremarkable.   The visualized s -ortho has been consulted by Dr. Claudio Magana due to increased pain and worsening ability to ambulate  -may need repeat imaging - will defer to ortho  -PT/OT eval  -cont with norco for pain control    Anemia; B thalassemia, Rectal ca  -requiring transfusion PRBCs Patient is a 80year old female with multiple comorbidities including rectal carcinoma, and known sacral and pelvic insufficiency fractures. Was last seen by Dr. Nicki Nath with ortho on 6/29/18.   She had been doing PT and was doing well ambulating with a c Comment: Procedure: COLONOSCOPY;  Surgeon: Dakota Phan MD;  Location: [address] [place] [address] ENDOSCOPY  No date: D & C  No date: ILEOSTOMY/JEJUNOSTOMY,NONTUBE  No date: OTHER SURGICAL HISTORY      Comment: colon resection x 2  2003: OTHER SURGICAL HIST cholecalciferol (VITAMIN D3) cap/tab 1,000 Units 1,000 Units Oral Daily   diphenoxylate-atropine (LOMOTIL) 2.5-0.025 MG per tab 1 tablet 1 tablet Oral QID PRN   folic acid (FOLVITE) tab 1 mg 1 mg Oral Daily   HYDROcodone-acetaminophen (NORCO)  MG per positives and negatives noted in the HPI      Physical Exam:   Blood pressure 101/40, pulse 68, temperature 98.3 °F (36.8 °C), temperature source Oral, resp. rate 18, weight 107 lb 12.9 oz (48.9 kg), SpO2 97 %, not currently breastfeeding.     gen - nad, la - weight bearing as tolerated bilateral lower extremities with a walker. PT/OT for mobilization  - can follow up with her primary orthopaedic surgeon, Dr. Darryl Strauss, as needed as an outpatient.     Thank you for allowing me to participate in the care of you still had a complex fluid-filled lesion with peripheral calcification in the right ischiorectal fossa with some increase in her pain.  She saw Dr. Angel Aldana in May. [facility] noted the healing fractures in the pelvis.  She Charmariel Flood as well.  She had an [facility] • Thalassemia minor    • THALLASEMIA    • Unspecified essential hypertension    • Unspecified sleep apnea psg 8/5/13 TX 8-27-13    AHI 52/ supine 101/ sao2 88%  CPAP 11 [employer]   • Visual impairment    • Vulvar cancer [facility])        Past Surgical History  Pa -   Moving from lying on back to sitting on the side of the bed?: A Little   How much help from another person does the patient currently need. ..   -   Moving to and from a bed to a chair (including a wheelchair)?: A Little   -   Need to walk in hospital r to [facility] admission for renal failure, TANIA, anemia, dehydration. Results of the AM-PAC \"6 clicks\" Inpatient Daily Mobility Short Form for the patient is 47% degree of basic mobility impairment.     At this time, Pt. presents with decreased bal Related Notes:  Original Note by Latisha Clark PT (Physical Therapist) filed at 7/30/2018  4:23 PM        PHYSICAL THERAPY TREATMENT NOTE - INPATIENT    Room Number: 757/963-G     Session: 1[CK. 1]     Number of Visits to Meet Established Goals: Nausea    Stage 4 chronic kidney disease (HCC)    Acidosis[CK.2]      Past Medical History[CK.1]  Past Medical History:   Diagnosis Date   • ALLERGIC RHINITIS    • ANXIETY    • Anxiety state    • Autoimmune disease ([facility] [address] [facility]    • Blood disorder    • Cancer ( Comment: hip fracture[CK. 2]    SUBJECTIVE  Denies pain at this time with lying in bed, and pt shows me that she can move RLE in heel slides at this moment, but that comes and goes depending on pain.  Pt continues to talk to me about her worries around b PT Approx Degree of Impairment Score: 46.58%   Standardized Score (AM-PAC Scale): 43.63   CMS Modifier (G-Code): CK[CK.2]    FUNCTIONAL ABILITY STATUS  Gait Assessment[CK. 1]   Gait Assistance: Maximum assistance (actual = min assist/cga)  Distance (ft): 50 recommendation, but cautioned pt to not allow therapy to overdo in that setting.   I explained that I will set out specific goals that are appropriate for her given her repeated pelvic insufficiency fractures, and that I expect her stay to be only 7-10 days Pt will cont to benefit from skilled inpt pt to address the above problems and facilitate return to baseline.        DISCHARGE RECOMMENDATIONS[CK.1]  PT Discharge Recommendations: Sub-acute rehabilitation (ELOS = 7-14 days (careful not to overdo) then 24/7 Therapist) filed at 7/30/2018  4:25 PM        PHYSICAL THERAPY TREATMENT NOTE - INPATIENT    Room Number: 736/762-P     Session: 1     Number of Visits to Meet Established Goals: 5    Presenting Problem: increasing pelvic pain causing increasing difficulty Past Medical History:   Diagnosis Date   • ALLERGIC RHINITIS    • ANXIETY    • Anxiety state    • Autoimmune disease ([facility] [address]    • Blood disorder    • [facility]     vulva 14 years ago, tx with surgery alone.    • Crohn disease ([facility]    • Crohn's disease (HC move RLE in heel slides at this moment, but that comes and goes depending on pain. Pt continues to talk to me about her worries around being unable to care for herself or her home going forward. C/o nausea with sitting up and moving, but not as bad today. Distance (ft): 50  Assistive Device: Rolling walker  Pattern: Shuffle;R Decreased stance time (kyphotic, )  Stoop/Curb Assistance: Not tested  Comment : na      Bed Mobility  Rolling to the Right = NT  Rolling to the Left = NT  Supine to Sit = HOB elevated staff refer to my notes for goals if she needs to. I also answered additional questions and made suggestions for pt regarding organizing her home with assist of her brother, or with a caregiver at home, but definitely someone she trusts.   Particularly gentle AI treatments focused on function and safety, due to repeated pelvic insufficiency fractures. Subacute rehab is recommended for 7-10 days.   After this period of rehabilitation patient should achieve mod indep level in transfers and ambulation o needed. [CK.1]      Attribution Key    CK. 1 - Fly Ann, PT on 7/30/2018 10:29 AM               Physical Therapy Note signed by Anshul Sloan PT at 7/29/2018 12:43 PM  Version 1 of 1    Author:  Anshul Sloan PT Service: was referred to Dr Can Layne. \"  Ortho consult on 7/28/18 dx pelvic insufficiency fractures, which has been ongoing problem and for which pt had been rec'g HHPT.  7/28/18 ortho consult finds that the fractures remain non-surgical and that pt is WBAT. Comment: 6/09 no dysplasia.   Repeat 2014 5/13/2014: COLONOSCOPY      Comment: Procedure: COLONOSCOPY;  Surgeon: Yu Isbell MD;  Location: [facility] ENDOSCOPY  No date: D & C  No date: ILEOSTOMY/JEJUNOSTOMY,NONTUBE  No date: OTHER SURGICA has a big tumor in her abdomen that isn't going to get better.   He states that only 1 doctor has expressed the understanding that this isn't going to get better, and so he seems very upset that there doesn't seem to be a clear game plan for her medical car wheelchair, bedside commode, etc.): A Little   -   Moving from lying on back to sitting on the side of the bed?: A Little[CK. 2]   How much help from another person does the patient currently need. .. [CK.1]   -   Moving to and from a bed to a chair (includin conditions as assisting in maximizing quality of life to her best ability.   However, explained that her thinking about needing more assistance regularly is important because it is difficult to stay how much longer she may be able to live alone safely, and and this patient is demonstrating a 46.58% degree of impairment in mobility. Research supports that patients with this level of impairment may benefit from skilled inpt PT;      Based on this evaluation, patient's clinical presentation is evolving and overa No notes of this type exist for this encounter. SLP Notes     No notes of this type exist for this encounter.             Immunizations     Name Date      Afilibercept,1mg,inj 01/18/17     Afilibercept,1mg,inj 07/20/16     Afilibercept,1mg,inj 04/20/1 Vitamin B-12 Up To 1000mcg, IM/SC Inj 03/08/16     Vitamin B-12 Up To 1000mcg, IM/SC Inj 01/12/16     Vitamin B-12 Up To 1000mcg, IM/SC Inj 12/11/15     Vitamin B-12 Up To 1000mcg, IM/SC Inj 11/02/15     Vitamin B-12 Up To 1000mcg, IM/SC Inj 09/22/15 Vitamin B-12 Up To 1000mcg, IM/SC Inj 10/14/11     Vitamin B-12 Up To 1000mcg, IM/SC Inj 08/31/11     Vitamin B-12 Up To 1000mcg, IM/SC Inj 07/18/11     Vitamin B-12 Up To 1000mcg, IM/SC Inj 05/25/11     Vitamin B-12 Up To 1000mcg, IM/SC Inj 04/06/11

## (undated) NOTE — LETTER
January 5, 7567        CERTIFIED  Yossi Gaxiola  [address] [facility] [address]          Dear Katerina Luna:    Our office has been trying to reach you via telephone regarding your recent test results.   Your results were reviewed by Dr. Zach Felix with requested time period, some results have not been displayed. A complete set of results can be found in Results Review.              Please call if you have further questions,    Maria R Calixto RN for Glorya Peabody, MD

## (undated) NOTE — ED AVS SNAPSHOT
Crystal Rachanas   MRN: [id number]    Department:  THE [facility] Emergency Department in [place]   Date of Visit:  9/23/2018           Disclosure     Insurance plans vary and the physician(s) referred by the ER may not be covered by your plan.  Please co tell this physician (or your personal doctor if your instructions are to return to your personal doctor) about any new or lasting problems. The primary care or specialist physician will see patients referred from the [facility] Emergency Department.  Jakub Lowry

## (undated) NOTE — ED AVS SNAPSHOT
Dea Fabry   MRN: [id number]    Department:  [address] Emergency Department in [place]   Date of Visit:  10/27/2017           Disclosure     Insurance plans vary and the physician(s) referred by the ER may not be covered by your plan.  Please c If you have been prescribed any medication(s), please fill your prescription right away and begin taking the medication(s) as directed    If the emergency physician has read X-rays, these will be re-interpreted by a radiologist.  If there is a significant

## (undated) NOTE — MR AVS SNAPSHOT
After Visit Summary   6/2/2017    Chirag Rico    MRN: [id number]           Diagnoses this Visit     Rectal cancer [facility])    -  Primary     Dehydration           Allergies     Aspirin Cr [Aspirin] Bleeding    Ciprofloxacin     Itching      [place] Appointment with Sarah Pittman; RN - DR. WILCOX at [facility] in [place] ([phone]) [address] [address]            Result Summary for BASIC METABOLIC PANEL (8)      Component Results     Component Jess WBC 6.1  4.0-13.0  x10(3) uL Final    RBC 4.75  3.80-5.10  x10(6)uL Final    HGB 11.0 (L) 12.0-16.0  g/dL Final    HCT 34.3  34.0-50.0  % Final    .0  150.0-450.0  10(3)uL Final    MCV 72.2 (L) 81.0-100.0  fL Final    MCH 23.2 (L) 27.0-33.2  pg [employer] Call [phone] for help. [employer] is NOT to be used for urgent needs. For medical emergencies, dial 911.

## (undated) NOTE — MR AVS SNAPSHOT
After Visit Summary   6/9/2017    Fareed Finnegan    MRN: [id number]           Diagnoses this Visit     Stage 3 chronic kidney disease         Rectal cancer (HCC)           Allergies     Aspirin Cr [Aspirin] Bleeding    Ciprofloxacin     Itching Your appointment is on the [address] [place] [address] in the [facility]. The address is [facility], [address], [place]. Please register at the [address] [place] [address] on the second floor.    [address] [place] [address]. [address] [place] [address]

## (undated) NOTE — MR AVS SNAPSHOT
[facility] [place]  [address] [facility], [address] [place] [address]                    After Visit Summary   4/5/2017    Urvashi Stephany    MRN: [id number]           Visit Information        Provider Department Dept Phone    4/ Future Appointments Provider Department Dept Phone    4/5/2017 2:45 PM Karl Layne MD [facility] INTERNAL MEDICINE - [place] [address]    4/7/2017 10:30 AM Marek Rosario MD; RN - DR. Basil [address] in [place]-

## (undated) NOTE — MR AVS SNAPSHOT
After Visit Summary   6/5/2017    Jose L Jacobsen    MRN: [id number]           Allergies     Aspirin Cr [Aspirin] Bleeding    Ciprofloxacin     Itching      Flagyl [Metronidazole]     Itching     Hydrocodone-Acetaminophen [Hydroxyzine] Dizziness office, you can view your past visit information in [employer] OP[employer]arEditorially by going to Visits < Visit Summaries. Topmission questions? Call [phone] for help. Topmission is NOT to be used for urgent needs. For medical emergencies, dial 911.

## (undated) NOTE — IP AVS SNAPSHOT
[facility] [place] [address] [place], [address] ~ [phone]                Discharge Summary   5/16/2017    Lazarus Sloan           Admission Information        Provider Department    5/16/2017 Arlen Rm MD  4 Take 1 tablet (650 mg total) by mouth 2 (two) times daily.     Yolanda Chan taking these medications        Instructions Authorizing Provider    Morning Afternoon Evening As Needed    diphenoxylate-atropin [address] [facility]. [address] [facility] [place] [address]             Jun 05, 2017  2:00 PM   Radiation Oncology Visit with Liliane Abad MD   [facility] in [address] [address] Patient or Legal Representative:  Yes    Procedure benefits, risks, and side effects, likelihood of achieving goals, potential problems during recovery, reasonable alternatives including risks, benefits, alternative side effects and risks related to not re HgbA1C Glucose BUN Creatinine Calcium Alkaline Phosph AST    -- (05/28/17)  99 (05/28/17)  18 (05/28/17)  1.72 (H) (05/28/17)  9.0 (05/20/17)  85 (05/20/17)  18      Metabolic Lab Results  (Last result in the past 90 days)    ALT Bilirubin,Total Total Pro _____________________________________________________________________________    Medication Side Effects - Medications to be taken at home  As your caregivers, we want you to be aware of the medications you are prescribed to take and their potential SIDE E

## (undated) NOTE — ED AVS SNAPSHOT
Margarita López   MRN: [id number]    Department:  THE [facility] Emergency Department in [place]   Date of Visit:  3/30/2018           Disclosure     Insurance plans vary and the physician(s) referred by the ER may not be covered by your plan.  Please co tell this physician (or your personal doctor if your instructions are to return to your personal doctor) about any new or lasting problems. The primary care or specialist physician will see patients referred from the [facility] Emergency Department.  Zaria Castro

## (undated) NOTE — MR AVS SNAPSHOT
After Visit Summary   6/20/2017    Ana Cristina Paiz    MRN: [id number]           Diagnoses this Visit     Dehydration    -  Primary       Allergies     Aspirin Cr [Aspirin] Bleeding    Ciprofloxacin     Itching      Flagyl [Metronidazole]     Itc

## (undated) NOTE — MR AVS SNAPSHOT
After Visit Summary   6/8/2017    Kathleen Pastor    MRN: [id number]           Diagnoses this Visit     Dehydration    -  Primary       Allergies     Aspirin Cr [Aspirin] Bleeding    Ciprofloxacin     Itching      Flagyl [Metronidazole]     Itch Your appointment is on the [facility] in the [facility]. The address is [address] [place] [address], [facility], [address], [place]. Please register at the [address] [place] [address] on the second floor.    [address] [facility] [address] [facility] [address] [address] [address]

## (undated) NOTE — IP AVS SNAPSHOT
[address]            (For Outpatient Use Only) Initial Admit Date: 12/27/2018   Inpt/Obs Admit Date: Inpt: 12/27/18 / Obs: N/A   Discharge Date:    Chicho Eusebio:  [de-identified]   MRN: [de-identified]   CSN: [id number]        MSEWBYORH  WFDGQ Subscriber ID:  Pt Rel to Subscriber:    Hospital Account Financial Class: Medicare    December 30, 2018

## (undated) NOTE — IP AVS SNAPSHOT
[address]            (For Outpatient Use Only) Initial Admit Date: 7/26/2018   Inpt/Obs Admit Date: Inpt: 7/26/18 / Obs: N/A   Discharge Date:    Loren Brennan:  [de-identified]   MRN: [de-identified]   CSN: [id number]        ENCOUNTER  Patient Subscriber ID:  Pt Rel to Subscriber:    Hospital Account Financial Class: Medicare    July 31, 2018

## (undated) NOTE — MR AVS SNAPSHOT
After Visit Summary   6/19/2017    Chirag Rico    MRN: [id number]           Diagnoses this Visit     Rectal cancer [facility])    -  Primary       Allergies     Aspirin Cr [Aspirin] Bleeding    Ciprofloxacin     Itching      Flagyl [Metronidazole] If you've recently had a stay at the Hospital you can access your discharge instructions in [employer] by going to Visits < Admission Summaries.  If you've been to the Emergency Department or your doctor's office, you can view your past visit information in My

## (undated) NOTE — MR AVS SNAPSHOT
After Visit Summary   6/16/2017    Doloresstacey BlissCalvillo    MRN: [id number]           Diagnoses this Visit     Rectal cancer [facility])    -  Primary     Stage 3 chronic kidney disease         Hypomagnesemia         Hyponatremia         Dehydration ([phone])   [address] [place] [address]       Friday June 23, 2017 1:00 PM     Appointment with Maureen Gil at [facility] in [place] ([phone])   [id number]  [address] [address] [place] [address] Component Results     Component Value Flag Ref Range Units Status    WBC 6.3  4.0-13.0  x10(3) uL Final    RBC 4.28  3.80-5.10  x10(6)uL Final    HGB 9.8 (L) 12.0-16.0  g/dL Final    HCT 30.0 (L) 34.0-50.0  % Final    .0  150.0-450.0  10(3)uL Fin

## (undated) NOTE — MR AVS SNAPSHOT
After Visit Summary   6/4/2017    Norm Brink    MRN: [id number]           Diagnoses this Visit     Dehydration    -  Primary       Allergies     Aspirin Cr [Aspirin] Bleeding    Ciprofloxacin     Itching      Flagyl [Metronidazole]     Itch view more details from this visit by going to [email or web address]. If you've recently had a stay at the Hospital you can access your discharge instructions in Sproomhart by going to Visits < Admission Summaries.  If you've been to the Emergency Depar

## (undated) NOTE — MR AVS SNAPSHOT
After Visit Summary   5/9/2017    Rhea Giordano    MRN: [id number]           Allergies     Aspirin Cr [Aspirin] Bleeding    Ciprofloxacin     Itching      Flagyl [Metronidazole]     Itching     Hydrocodone-Acetaminophen [Hydroxyzine] Dizziness If you've recently had a stay at the Hospital you can access your discharge instructions in H[employer]ing by going to Visits < Admission Summaries.  If you've been to the Emergency Department or your doctor's office, you can view your past visit information in My

## (undated) NOTE — IP AVS SNAPSHOT
Patient Demographics     Address  [address] [facility] [address] Phone  [phone] [facility]) *Preferred*  [phone] [facility]) E-mail Address  [email or web address]      Emergency Contact(s)     Name Relation Home Work GAVIN Muñoz Take 1 tablet (0.5 mg total) by mouth 2 (two) times daily as needed for Anxiety.    Pam Lau MD         diphenoxylate-atropine 2.5-0.025 MG Tabs  Commonly known as:  LOMOTIL      Take 1-2 tablets by mouth 4 (four) times daily as needed for Diarrhe Where to Get Your Medications      Please  your prescriptions at the location directed by your doctor or nurse    Bring a paper prescription for each of these medications  TraMADol HCl 50 MG Tabs           405-405-A - MAR ACTION REPORT  (last 24 hrs BASIC METABOLIC PANEL (8) [[id number]] (Abnormal)  Resulted: 12/30/18 0656, Result status: Final result   Ordering provider:  Brenden Padilla MD  12/29/18 2300 Resulting lab:  [facility]    Specimen Information    Type Source Collected On   Blood — 12/30/ for pelvic mucinous adenocarcinoma s/p chemo/RT presenting with right hip pain. Has been bothered by pain for a few days reports she noticed she was limping on that side due to pain for a few days.  Yesterday pain became much worse to the point she was unab Darbepoetin Orlando (ARANESP) 300 MCG/0.6ML injection 300 mcg 300 mcg Subcutaneous Q30 Days Leopoldo Barlow  mcg at 10/11/16 1034     Current Outpatient Medications on File Prior to Encounter:  sodium bicarbonate 650 MG Oral Tab Take 2 tablets (1,300 mg • OCUVITE-LUTEIN  1 tablet Oral Daily     Continuous Infusions:   • sodium bicarbonate infusion 150 mEq (12/27/18 2730)     PRN: HYDROmorphone HCl, acetaminophen, ALPRAZolam, diphenoxylate-atropine, Saline Nasal Spray, TraZODone HCl, HYDROcodone-acetaminop HCT 29.9 12/27/2018    .0 12/27/2018    CREATSERUM 2.74 12/27/2018    BUN 53 12/27/2018     12/27/2018    K 5.1 12/27/2018     12/27/2018    CO2 16.0 12/27/2018    GLU 98 12/27/2018    CA 10.6 12/27/2018    ALB 3.5 12/27/2018    HOSPITAL [address] **Certification      PHYSICIAN Certification of Need for Inpatient Hospitalization - Initial Certification    Patient will require inpatient services that will reasonably be expected to span two midnight's based on the clinical documentation in H+P.    Base • Cancer ([facility]     vulva 14 years ago, tx with surgery alone.    • Crohn disease ([facility]    • Crohn's disease ([facility]    • Esophageal reflux    • Fibromyalgia    • Hearing impairment    • High blood pressure    • History of blood transfusion    • IBS (irritable ALPRAZolam 1 MG Oral Tab Take 1 tablet (1 mg total) by mouth 4 (four) times daily as needed for Anxiety. Disp: 20 tablet Rfl: 0   Saline Nasal Spray 0.65 % Nasal Solution 1 spray by Nasal route every 3 (three) hours as needed for congestion (dry nares).  Nabeel Saul Sulfa Antibiotics       RASH     Past Social Hx:  Denies heavy ETOH use  Denies illicts    Fam Hx  Family History   Problem Relation Age of Onset   • Cancer Sister         breast cancer   • Stroke Sister    • Heart Attack Sister    • Neurological Disorder of traumatic fracture, and the possibility of pathologic malignant involvement of the bone is raised.          Assessment/Plan:   Principal Problem:    Hip pain, acute, right  Active Problems:    Hyperkalemia    CKD (chronic kidney disease) stage 4, GFR 15- Filed:  12/29/2018  3:51 PM Status:  Signed    :  Berny Lawler MD (Physician)       [facility]    PATIENT'S NAME: Karo Diaz   ATTENDING PHYSICIAN: Atif Powers. Raza De La Cruz M.D.   Luann Hess: Pamela Lopez M.D.    PATIENT A GENERAL:  She is a pleasant female in no acute distress. She is oriented x3 with normal mood and affect. She is lying comfortably in her hospital bed. EXTREMITIES:  She has no tenderness to palpation about the pelvis.   She has no pain with flexion or e Physical Therapy Notes (last 72 hours) (Notes from 12/27/2018  5:41 PM through 12/30/2018  5:41 PM)      Physical Therapy Note signed by Olga De La Garza, PT at 12/29/2018 12:10 PM  Version 1 of 1    Author:  Olga De La Garza, PT Service:  [place] Admit 8/18/18 urinary retention and hydronephrosis. Therapy significant labs:  na      Problem List[CK. 1]  Principal Problem:    Hip pain, acute, right  Active Problems:    Hyperkalemia    Rectal cancer (HCC)    CKD (chronic kidney disease) stage 4, GF Performed by Tony Gottlieb MD at [facility] ENDOSCOPY   • D & C     • EXPLORATORY LAPAROTOMY N/A 9/26/2014    Performed by Vicente North MD at [address] [place] [address] N/A 8/23/2018    Performed by Leonie Al MD at [place] [address] Upper extremity ROM and strength are within functional limits     Lower extremity ROM is within functional limits except for the following:   Right Hip flexion 80 deg with pain    Lower extremity strength is within functional limits of at least 3-/5 on rig Supine to Sit = impulsive, min assist of HOB elevated  Sit to supine = NT    Transfers  Sit to stand = impulsive, min assist for balance  Stand to sit = impulsive, min assist for safety      Skilled Therapy Provided: Above functional ability scores are FIM limitations in independent bed mobility, transfers and gait. The patient is below baseline and would benefit from skilled inpatient PT to address the above deficits to assist patient in returning to prior level of function.     Subacute rehab is recommend Physical Therapy Note signed by Dean Aguilar PT at 12/28/2018  2:18 PM  Version 1 of 1    Author:  Dean Aguilar PT Service:  Rehab Author Type:  Physical Therapist    Filed:  12/28/2018  2:18 PM Date of Service:  12/28/2018  2:17 PM Status:  Signed Technetium Tc99mm Sestamibi, Iv, Up To 40 Millicuries 77/36/07     Vitamin B-12 Up To 1000mcg, IM/SC Inj 09/20/18     Vitamin B-12 Up To 1000mcg, IM/SC Inj 05/10/18     Vitamin B-12 Up To 1000mcg, IM/SC Inj 04/12/18     Vitamin B-12 Up To 1000mcg, IM/SC I Vitamin B-12 Up To 1000mcg, IM/SC Inj 08/21/13     Vitamin B-12 Up To 1000mcg, IM/SC Inj 07/09/13     Vitamin B-12 Up To 1000mcg, IM/SC Inj 06/19/13     Vitamin B-12 Up To 1000mcg, IM/SC Inj 05/22/13     Vitamin B-12 Up To 1000mcg, IM/SC Inj 05/17/13 1/3/2019 10:00 AM LINDA Watson in [place]    1/24/2019 3:30 PM Kiel Jara MD [facility] in [place]    1/24/2019 3:45 PM LINDA Watson in [place]

## (undated) NOTE — IP AVS SNAPSHOT
[address]            (For Outpatient Use Only) Initial Admit Date: 8/28/2019   Inpt/Obs Admit Date: Inpt: 8/28/19 / Obs: N/A   Discharge Date:    Huel Curling:  [de-identified]   MRN: [de-identified]   CSN: [id number]   CEID: [id number] Subscriber Name:  [facility] [address] :    Subscriber ID:  Pt Rel to Subscriber:    Hospital Account Financial Class: Medicare    2019

## (undated) NOTE — IP AVS SNAPSHOT
Patient Demographics     Address  [address] [facility] [address] Phone  [phone] [facility]) *Preferred* E-mail Address  [email or web address]      Emergency Contact(s)     Name Relation Home Work [facility] Spouse [address] [place] [address] Instructions Authorizing Provider Morning Afternoon Evening As Needed   acetaminophen 325 MG Tabs  Commonly known as:  TYLENOL      Take 1 tablet (325 mg total) by mouth every 4 (four) hours as needed.    KIRBY Rhodes         ALPRAZolam 1 MG Tabs  Co 321-321-A - MAR ACTION REPORT  (last 24 hrs)    ** SITE UNKNOWN **     Order ID Medication Name Action Time Action Reason Comments    [id number] 0.9%  NaCl infusion 08/31/18 1437 New Bag      [id number] ALPRAZolam Cecillia Osgood) tab 1 mg 08/31/18 2217 Given      23 Ordering provider:  Andrew Sanchez MD  08/31/18 2010 Resulting lab:  [facility]    Specimen Information    Type Source Collected On   Blood — 08/31/18 2046          Components    Component Value Reference Range Flag Lab   HGB 9.1 12.0 - 16.0 g/dL L Edw Fungus Stain Once [[id number]] Collected:  08/27/18 1257    Order Status:  Completed Lab Status:  Final result Updated:  08/28/18 1820    Specimen:   Body fluid, unspecified from Peritoneal fluid      Fungus Smear No Yeast or Fungal elements observed    AFB Pelvic fluid collection    History of Present Illness:   79 yo F prior surgery, pelvic fluid collection     History   Past Medical History:  Past Medical History:   Diagnosis Date   • ALLERGIC RHINITIS    • ANXIETY    • Anxiety state    • Autoimmune diseas Comment: hip fracture    Social History:     Smoking status: Former Smoker  1.00 Packs/day  For 25.00 Years     Types: Cigarettes    Quit date: 1/19/1975    Smokeless tobacco: Never Used    Alcohol use Yes  0.0 oz/week     Comment: 1 glass of wine a mo PTP  13.2   INR  0.96     Recent Labs   Lab  08/25/18   0535  08/26/18   0538  08/27/18   0519   GLU  94  91  94   BUN  13  13  13   CREATSERUM  1.77*  1.98*  1.90*   GFRAA  30*  26*  28*   GFRNAA  26*  23*  24*   CA  6.8*  7.0*  6.7*   NA  139  140  142 Other thalassemia ([facility]     Age-related osteoporosis with current pathological fracture with routine healing     Megaloblastic anemia due to vitamin B12 deficiency     Unspecified hearing loss     Senile nuclear sclerosis     High output ileostomy ([facility] colectomy is to avoid stoma formation. Subsequently underwent completion colectomy with Ventura's pouch and small bowel resection with loop ileostomy in September 2014. She has had fistulas to the perianal region and initially followed up at [facility].   More • High blood pressure    • History of blood transfusion    • IBS (irritable bowel syndrome)    • Insomnia    • KIDNEY STONE    • Macular degeneration    • Migraines     as a child   • Osteoarthrosis, unspecified whether generalized or localized, unspecifie • Neurological Disorder Sister      dementia/organic brain syndrome   • Breast Cancer Sister 79   • Cancer Brother      basal cell cancer on scalp and ear   • angina[other] [OTHER] Mother         • Heart Disorder Mother    • Diabetes Father    • Hy Findings were discussed with the patient. She had failed endoscopic decompression. She is not a candidate for mucous fistula because of short length. Surgical intervention would be a major undertaking.   One option is to resect the rectum down to the obs Discharge Diagnoses:   Rectal cancer  Ventura's pouch obstruction s/p drain placement  TANIA, resolved  Hyperkalemia, resolved  Anemia 2/2 chronic disease, B thalassemia  Chronic metabolic acidosis  bilateral sacral pubic fractures  Urinary retention, reso - multifactorial 2/2 chronic disease and beta thalassemia.  On epo  - epistaxis with Hg 7.0 8/31 and 1 unit PRBCs ordered prior to discharge     4) b/l sacral/pubic fractures  - noted on MRI 7/5/18, continue conservative mgmt per ortho     5) urine retentio folic acid 1 MG Tabs  Commonly known as:  FOLVITE     Melatonin 3 MG Caps     * MULTIVITAMIN ADULTS OR     * PRESERVISION AREDS Tabs     Vitamin D 1000 units Tabs  Take 1,000 Units by mouth daily.         * This list has 2 medication(s) that are the same as b 7-- would like to give her 1 unit prior to d/c- KIRBY Mcdaniels discussed with Dr. Elin William as well.    D/c to [facility]- at time of this writing i'm informed that the [facility] can't accept her at 5 pm or after today so she will likely need to go there tomorrow AM.[RJ.1] sigmoid and rectosigmoid stump to the level of the anal verge.  This has developed since 4/14/2018.  The possibility of anorectal obstruction cannot be excluded and correlation with   physical examination to exclude involvement of the anorectal junction by • Macular degeneration    • Migraines     as a child   • Osteoarthrosis, unspecified whether generalized or localized, unspecified site    • OSTEOPENIA    • Paget's disease     Vulva   • Peripheral vascular disease ([address]    • Perirectal abscess    • Pneumo Static Sitting: Good  Dynamic Sitting: Good           Static Standing: Fair -  Dynamic Standing: Fair -[KP. 2]    ACTIVITY TOLERANCE  Room air  No shortness of breath    AM-PAC '6-Clicks' INPATIENT SHOR the \"up good down bad\" strategy for stairs. She showed signs of fatigue following stair navigation, but was able to ambulate back to her room with CGA only. Pt left seated in bedside chair with [facility] staff present. [KP.1]    Patient End of Session: Up in [place] Goal #5 Patient is able to navigate 2 stairs with assistance level: CGA  Goal established 08/31/18   Goal #6     Goal Comments: Goals established on 8/20/2018[KP. 1]     Attribution Giraldo    BIBIANA. 1 - Montez Lester on 8/31/2018  2:28 PM  BIBIANA.2 - Montez Lester and pelvis fx's (she has been WBAT).  See below for detailed past medical/surigcal history.      CT of abdomen/pelvis 8/18/18-  CONCLUSION: America Guillen is a known right sided ischiorectal mass measuring 6.6 x 4.2 x 6.5 cm which is probably extending through the chronic kidney disease ([facility]    Obstructive uropathy[KP. 2]      Past Medical History[KP. 1]  Past Medical History:   Diagnosis Date   • ALLERGIC RHINITIS    • ANXIETY    • Anxiety state    • Autoimmune disease ([facility]    • Blood disorder    • Cancer ([facility] SUBJECTIVE  Pt stated, \"I need a different bag for my ileostomy. \"    Patient self-stated goal is to go home    OBJECTIVE[KP.1]  Precautions:  Other (Comment) (ileostomy, naqvi; unable to tolerate prolonged sitting >5')[KP.2]    WEIGHT BEARING RESTRICTION[ session/findings;Call light within reach; All patient questions and concerns addressed;SCDs in place[KP. 2]    ASSESSMENT   Patient is a[KP.3 80year old[KP.2] female admitted 8/18/2018 for n/v.   In OT session 3 of 5 patient is progressing with the followin Afilibercept,1mg,inj 04/20/16     Afilibercept,1mg,inj 02/09/16     Afilibercept,1mg,inj 01/06/16     Afilibercept,1mg,inj 11/18/15     INFLUENZA 08/29/17     INFLUENZA 11/16/16     INFLUENZA 09/15/16     INFLUENZA 09/27/15     INFLUENZA 01/26/15     INFL Vitamin B-12 Up To 1000mcg, IM/SC Inj 08/28/15     Vitamin B-12 Up To 1000mcg, IM/SC Inj 07/07/15     Vitamin B-12 Up To 1000mcg, IM/SC Inj 06/22/15     Vitamin B-12 Up To 1000mcg, IM/SC Inj 04/30/15     Vitamin B-12 Up To 1000mcg, IM/SC Inj 03/31/15 Vitamin B-12 Up To 1000mcg, IM/SC Inj 02/23/11     Vitamin B-12 Up To 1000mcg, IM/SC Inj 01/17/11     Vitamin B-12 Up To 1000mcg, IM/SC Inj 11/10/10     Vitamin B-12 Up To 1000mcg, IM/SC Inj 09/10/10     Vitamin B-12 Up To 1000mcg, IM/SC Inj 08/16/10

## (undated) NOTE — MR AVS SNAPSHOT
After Visit Summary   6/19/2017    Gonzalez Rhoades    MRN: [id number]           Diagnoses this Visit     Nausea         Stage 3 chronic kidney disease         Rectal cancer (HCC)           Allergies     Aspirin Cr [Aspirin] Bleeding    Ciproflox Friday June 23, 2017 1:00 PM     Appointment with Darell Hogan at [facility] in [place] ([phone])   [address]  [address] [place] [address]            Result Summary for BASIC METABOLIC PANEL (8)      Component Resu HCT 26.7 (L) 34.0-50.0  % Final    .0  150.0-450.0  10(3)uL Final    MCV 72.4 (L) 81.0-100.0  fL Final    MCH 23.6 (L) 27.0-33.2  pg Final    MCHC 32.6  31.0-37.0  g/dL Final    RDW 21.6 (H) 11.5-16.0  % Final    RDW-SD 54.4 (H) 35.1-46.3  fL Final

## (undated) NOTE — LETTER
[facility] [address], [address]  Consent for Procedure/Sedation    Date: 09/09/19    Time: 0730      1. I authorize the performance upon Lazarus Sloan the following:  Mary Anne nicholas    2.  I authorize Dr. Elroy Granados (and diogoe ________________________________    ___________________    Witness: _________________________      Date: ___________________    Printed: 2019   7:30 AM  Patient Name: Norm Brink        : 1934       Medical Record #: [id number]

## (undated) NOTE — ED AVS SNAPSHOT
Shani Daniels   MRN: [id number]    Department:  [facility] Emergency Department   Date of Visit:  9/12/2019           Disclosure     Insurance plans vary and the physician(s) referred by the ER may not be covered by your plan.  Please contact tell this physician (or your personal doctor if your instructions are to return to your personal doctor) about any new or lasting problems. The primary care or specialist physician will see patients referred from the [facility] Emergency Department.  Tanja Croft

## (undated) NOTE — IP AVS SNAPSHOT
After Visit Summary   6/19/2017    Margarita López    MRN: [id number]           Allergies     Aspirin Cr [Aspirin] Bleeding    Ciprofloxacin     Itching      Flagyl [Metronidazole]     Itching     Hydrocodone-Acetaminophen [Hydroxyzine] Dizzines [email or web address]. If you've recently had a stay at the Hospital you can access your discharge instructions in [employer] by going to Visits < Admission Summaries.  If you've been to the Emergency Department or your doctor's office, you can view yo

## (undated) NOTE — MR AVS SNAPSHOT
After Visit Summary   6/3/2017    Ludmila Heredia    MRN: [id number]           Diagnoses this Visit     Dehydration    -  Primary       Allergies     Aspirin Cr [Aspirin] Bleeding    Ciprofloxacin     Itching      Flagyl [Metronidazole]     Itch Sigel in [place] ([phone])   [id number]  [address] [facility] [address]       Tuesday June 06, 2017 1:30 PM     Appointment with Giovanny Pacheco at [facility] in [place] ([phone]) [address] D[address]man

## (undated) NOTE — MR AVS SNAPSHOT
[facility] [address], [address] [place] [address]                    After Visit Summary   2/15/2017    Carrie Higuera    MRN: [id number]           Visit Information        Provider Department Dept Phone    2 Multiple Vitamins-Minerals (HM MULTIVITAMIN ADULT GUMMY) Oral Chew Tab Chew 1 capsule by mouth daily. diphenoxylate-atropine 2.5-0.025 MG Oral Tab Take 1 tablet by mouth 4 (four) times daily. LUTEIN OR Take 1 tablet by mouth daily.       sodium bicar

## (undated) NOTE — MR AVS SNAPSHOT
After Visit Summary   6/7/2017    Shani Daniels    MRN: [id number]           Diagnoses this Visit     CKD (chronic kidney disease) stage 4, GFR 15-29 ml/min ([facility])    -  Primary       Allergies     Aspirin Cr [Aspirin] Bleeding    Ciprofloxacin Appointment with Wendel Brunner; LINDA HEM/ONC Desmond Shea; [facility]ITA[address]EDW [address] at [facility] in [place] ([phone])   Your appointment is on the [address] [place] [address] in the [facility].  The address is [address] [place] [address],

## (undated) NOTE — MR AVS SNAPSHOT
[address] [facility]  [address] [facility], [address] [address] [phone]               Thank you for choosing us for your health care visit with Alisson Archuleta MD.  We are glad to serve you and happy to provide you with this This list is accurate as of: 2/9/17  2:08 PM.  Always use your most recent med list.                acetaminophen 500 MG Tabs   Take 1,000 mg by mouth every 6 (six) hours as needed for Pain.    Commonly known as:  TYLENOL EXTRA STRENGTH           alprazolam

## (undated) NOTE — LETTER
[facility]  Sara Minor [address] [facility], [address] [place] [address]    Consent for Operation    Date: __________________    Time: _______________    1. I authorize the performance upon Rhea Giordano the following operation:    Procedure(s):   FLEXIBLE SIGMOID procedure has been videotaped, the surgeon will obtain the original videotape. The hospital will not be responsible for storage or maintenance of this tape.     6. For the purpose of advancing medical education, I consent to the admittance of observers to t STATEMENTS REQUIRING INSERTION OR COMPLETION WERE FILLED IN.     Signature of Patient:   ___________________________    When the patient is a minor or mentally incompetent to give consent:  Signature of person authorized to consent for patient: ____________ supplements, and pills I can buy without a prescription (including street drugs/illegal medications). Failure to inform my anesthesiologist about these medicines may increase my risk of anesthetic complications.   · If I am allergic to anything or have had Anesthesiologist Signature     Date   Time  I have discussed the procedure and information above with the patient (or patient’s representative) and answered their questions. The patient or their representative has agreed to have anesthesia services.     ___

## (undated) NOTE — MR AVS SNAPSHOT
After Visit Summary   6/23/2017    Chris Santana    MRN: [id number]           Diagnoses this Visit     Metabolic acidosis    -  Primary     Chronic kidney disease, stage IV (severe) (HCC)         Rectal cancer (HCC)           Allergies     Asp discharge instructions in [employer] by going to Visits < Admission Summaries. If you've been to the Emergency Department or your doctor's office, you can view your past visit information in [employer] by going to Visits < Visit Summaries. [employer] questions?

## (undated) NOTE — IP AVS SNAPSHOT
After Visit Summary   4/24/2017    Ciera Barnes    MRN: [id number]           Allergies     Aspirin Cr [Aspirin] Bleeding    Ciprofloxacin     Itching      Flagyl [Metronidazole]     Itching     Hydrocodone-Acetaminophen [Hydroxyzine] Dizzines Appointment with Elan Skelton at [address] [place] [address] in [place] ([phone])   [address]  [place] [address]       Monday May 22, 2017 2:00 PM     Appointment with Elan Skelton; RN - DR. WILCOX at [address] [place] [address]

## (undated) NOTE — ED AVS SNAPSHOT
Ana Cristina Paiz   MRN: [id number]    Department:  [address] [facility] in [place]   Date of Visit:  12/12/2017           Disclosure     Insurance plans vary and the physician(s) referred by the ER may not be covered by your plan.  Please c tell this physician (or your personal doctor if your instructions are to return to your personal doctor) about any new or lasting problems. The primary care or specialist physician will see patients referred from the [facility] Emergency Department.  Shlomo Thomas

## (undated) NOTE — MR AVS SNAPSHOT
After Visit Summary   12/22/2017    Fareed Finnegan    MRN: [id number]           Visit Information     Date & Time  12/22/2017 10:30 AM Provider  REF [address] Department  Ref [facility] Dept.  Phone        Allergies as of 12/22/2017 3/29/2018 12:15 PM Adán Caballero in [facility] now offers Video Visits through [address] for adult and pediatric patients.   Video Visits are available Monday - Friday for many common conditions such as allergies, Life-threatening emergencies needing immediate intervention   at a hospital emergency room.       Average cost  $2,300*   *Cost varies based on your insurance coverage  For more information about hours, locations or appointment options available at

## (undated) NOTE — LETTER
[facility] [address], [address] [place] [address]    Consent for Anesthesia   1.    Kim Evelyn agree to be cared for by an anesthesiologist, who is specially trained to monitor me and give me medicine to put me to sleep or keep me comf vision, nerves, or muscles and in extremely rare instances death. 5. My doctor has explained to me other choices available to me for my care (alternatives).   6. Pregnant Patients (“epidural”):  I understand that the risks of having an epidural (medicine g

## (undated) NOTE — LETTER
[facility]  Uvaldosunitha Choudharytaylor 61 [address] [facility], [address] [place] [address]    Consent for Operation    Date: __________________    Time: _______________    1. I authorize the performance upon Gonzalez Rhoades the following operation:    Procedure(s):   FLEXIBLE SIGMOID procedure has been videotaped, the surgeon will obtain the original videotape. The hospital will not be responsible for storage or maintenance of this tape.     6. For the purpose of advancing medical education, I consent to the admittance of observers to t STATEMENTS REQUIRING INSERTION OR COMPLETION WERE FILLED IN.     Signature of Patient:   ___________________________    When the patient is a minor or mentally incompetent to give consent:  Signature of person authorized to consent for patient: ____________ supplements, and pills I can buy without a prescription (including street drugs/illegal medications). Failure to inform my anesthesiologist about these medicines may increase my risk of anesthetic complications.   · If I am allergic to anything or have had Anesthesiologist Signature     Date   Time  I have discussed the procedure and information above with the patient (or patient’s representative) and answered their questions. The patient or their representative has agreed to have anesthesia services.     ___

## (undated) NOTE — ED AVS SNAPSHOT
Chava Ruvalcaba   MRN: [id number]    Department:  [facility] Emergency Department   Date of Visit:  9/7/2019           Disclosure     Insurance plans vary and the physician(s) referred by the ER may not be covered by your plan.  Please contact tell this physician (or your personal doctor if your instructions are to return to your personal doctor) about any new or lasting problems. The primary care or specialist physician will see patients referred from the [facility] Emergency Department.  Dennis Luevano

## (undated) NOTE — MR AVS SNAPSHOT
After Visit Summary   6/13/2017    Rik Mukherjee    MRN: [id number]           Allergies     Aspirin Cr [Aspirin] Bleeding    Ciprofloxacin     Itching      Flagyl [Metronidazole]     Itching     Hydrocodone-Acetaminophen [Hydroxyzine] Dizzines

## (undated) NOTE — LETTER
[facility] [address], [address]    Consent for Operation    Date:       MAY 24, 2017                                      TIME      1.  I authorize the performance upon Gonzalez Rhoades the following operation:      FLEXIBLE S medical, scientific, or educational purposes, provided my identity is not revealed by the pictures or by descriptive texts accompanying them. If the procedure has been videotaped, the surgeon will obtain the original videotape.  The hospital will not be res I CERTIFY THAT I HAVE READ AND UNDERSTAND THE ABOVE CONSENT TO OPERATION, THAT MY DOCTOR PROVIDED ME WITH THE ABOVE EXPLANATIONS, THAT ALL BLANKS OR STATEMENTS REQUIRING INSERTION OR COMPLETION WERE FILLED IN.     Signature of Patient:   ___________________ · The last time that I ate or drank. · All of the medicines I take (including prescriptions, herbal supplements, and pills I can buy without a prescription (including street drugs/illegal medications).  Failure to inform my anesthesiologist about these med _____________________________________________________________________________  Anesthesiologist Signature     Date   Time  I have discussed the procedure and information above with the patient (or patient’s representative) and answered their questions.  The

## (undated) NOTE — LETTER
[address] [facility] [place]OD OR BLOOD COMPONENTS      In the course of your treatment, it may become necessary to administer a transfusion of blood or blood components.  This form provides basic information concerning this proc explain the alternatives to you if it has not already been done. I,Daysi Crowley, have read/had read to me the above. I understand the matters bearing on the decision whether or not to authorize a transfusion of blood or blood components.  I have no

## (undated) NOTE — MR AVS SNAPSHOT
After Visit Summary   2/2/2017    Ludmila Heredia    MRN: [id number]           Diagnoses this Visit     Rectal cancer [facility])    -  Primary     Abscess           Allergies     Aspirin Cr [Aspirin] Bleeding    Ciprofloxacin     Itching      Flagyl Appointment with Yenni Andujar at [address] [place] [address] ([phone] [facility] [address]       Thursday February 16, 2017     LAB:  CBC WITH DIFFERENTIAL WITH PLATELET        Thursday February 16, 2017     LAB:  CEA        Thursday